# Patient Record
Sex: FEMALE | Race: ASIAN | NOT HISPANIC OR LATINO | Employment: STUDENT | URBAN - METROPOLITAN AREA
[De-identification: names, ages, dates, MRNs, and addresses within clinical notes are randomized per-mention and may not be internally consistent; named-entity substitution may affect disease eponyms.]

---
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
## 2022-04-08 ENCOUNTER — OFFICE VISIT (OUTPATIENT)
Dept: FAMILY MEDICINE | Facility: CLINIC | Age: 22
End: 2022-04-08
Payer: COMMERCIAL

## 2022-04-08 VITALS
BODY MASS INDEX: 19.78 KG/M2 | SYSTOLIC BLOOD PRESSURE: 110 MMHG | HEART RATE: 98 BPM | OXYGEN SATURATION: 98 % | WEIGHT: 104.7 LBS | DIASTOLIC BLOOD PRESSURE: 64 MMHG

## 2022-04-08 DIAGNOSIS — F33.0 MILD EPISODE OF RECURRENT MAJOR DEPRESSIVE DISORDER (H): ICD-10-CM

## 2022-04-08 DIAGNOSIS — R21 RASH: ICD-10-CM

## 2022-04-08 DIAGNOSIS — Z23 NEEDS FLU SHOT: ICD-10-CM

## 2022-04-08 DIAGNOSIS — R63.0 ANOREXIA: Primary | ICD-10-CM

## 2022-04-08 DIAGNOSIS — R82.90 ABNORMAL URINALYSIS: ICD-10-CM

## 2022-04-08 LAB
ALBUMIN SERPL-MCNC: 3.9 G/DL (ref 3.5–5)
ALBUMIN UR-MCNC: NEGATIVE MG/DL
ALP SERPL-CCNC: 59 U/L (ref 45–120)
ALT SERPL W P-5'-P-CCNC: 10 U/L (ref 0–45)
ANION GAP SERPL CALCULATED.3IONS-SCNC: 11 MMOL/L (ref 5–18)
APPEARANCE UR: CLEAR
AST SERPL W P-5'-P-CCNC: 14 U/L (ref 0–40)
ATRIAL RATE - MUSE: 74 BPM
BACTERIA #/AREA URNS HPF: ABNORMAL /HPF
BASOPHILS # BLD AUTO: 0 10E3/UL (ref 0–0.2)
BASOPHILS NFR BLD AUTO: 1 %
BILIRUB SERPL-MCNC: 0.6 MG/DL (ref 0–1)
BILIRUB UR QL STRIP: NEGATIVE
BUN SERPL-MCNC: 8 MG/DL (ref 8–22)
C REACTIVE PROTEIN LHE: 0.1 MG/DL (ref 0–0.8)
CALCIUM SERPL-MCNC: 9 MG/DL (ref 8.5–10.5)
CHLORIDE BLD-SCNC: 107 MMOL/L (ref 98–107)
CHOLEST SERPL-MCNC: 124 MG/DL
CO2 SERPL-SCNC: 21 MMOL/L (ref 22–31)
COLOR UR AUTO: YELLOW
CREAT SERPL-MCNC: 0.64 MG/DL (ref 0.6–1.1)
DIASTOLIC BLOOD PRESSURE - MUSE: NORMAL MMHG
EOSINOPHIL # BLD AUTO: 0 10E3/UL (ref 0–0.7)
EOSINOPHIL NFR BLD AUTO: 0 %
ERYTHROCYTE [DISTWIDTH] IN BLOOD BY AUTOMATED COUNT: 12 % (ref 10–15)
ERYTHROCYTE [SEDIMENTATION RATE] IN BLOOD BY WESTERGREN METHOD: 18 MM/HR (ref 0–20)
FASTING STATUS PATIENT QL REPORTED: NORMAL
FERRITIN SERPL-MCNC: 23 NG/ML (ref 6–40)
FOLATE SERPL-MCNC: 11.7 NG/ML
GFR SERPL CREATININE-BSD FRML MDRD: >90 ML/MIN/1.73M2
GLUCOSE BLD-MCNC: 77 MG/DL (ref 70–125)
GLUCOSE UR STRIP-MCNC: NEGATIVE MG/DL
HCT VFR BLD AUTO: 38.5 % (ref 35–47)
HDLC SERPL-MCNC: 63 MG/DL
HGB BLD-MCNC: 13.1 G/DL (ref 11.7–15.7)
HGB UR QL STRIP: ABNORMAL
IMM GRANULOCYTES # BLD: 0 10E3/UL
IMM GRANULOCYTES NFR BLD: 0 %
INTERPRETATION ECG - MUSE: NORMAL
KETONES UR STRIP-MCNC: NEGATIVE MG/DL
LDLC SERPL CALC-MCNC: 46 MG/DL
LEUKOCYTE ESTERASE UR QL STRIP: NEGATIVE
LYMPHOCYTES # BLD AUTO: 2.4 10E3/UL (ref 0.8–5.3)
LYMPHOCYTES NFR BLD AUTO: 38 %
MCH RBC QN AUTO: 30 PG (ref 26.5–33)
MCHC RBC AUTO-ENTMCNC: 34 G/DL (ref 31.5–36.5)
MCV RBC AUTO: 88 FL (ref 78–100)
MONOCYTES # BLD AUTO: 0.3 10E3/UL (ref 0–1.3)
MONOCYTES NFR BLD AUTO: 5 %
MUCOUS THREADS #/AREA URNS LPF: PRESENT /LPF
NEUTROPHILS # BLD AUTO: 3.6 10E3/UL (ref 1.6–8.3)
NEUTROPHILS NFR BLD AUTO: 57 %
NITRATE UR QL: NEGATIVE
P AXIS - MUSE: 80 DEGREES
PH UR STRIP: 6 [PH] (ref 5–8)
PLATELET # BLD AUTO: 237 10E3/UL (ref 150–450)
POTASSIUM BLD-SCNC: 3.9 MMOL/L (ref 3.5–5)
PR INTERVAL - MUSE: 146 MS
PROT SERPL-MCNC: 7.5 G/DL (ref 6–8)
PTH-INTACT SERPL-MCNC: 57 PG/ML (ref 10–86)
QRS DURATION - MUSE: 76 MS
QT - MUSE: 382 MS
QTC - MUSE: 424 MS
R AXIS - MUSE: 93 DEGREES
RBC # BLD AUTO: 4.36 10E6/UL (ref 3.8–5.2)
RBC #/AREA URNS AUTO: ABNORMAL /HPF
SODIUM SERPL-SCNC: 139 MMOL/L (ref 136–145)
SP GR UR STRIP: >=1.03 (ref 1–1.03)
SQUAMOUS #/AREA URNS AUTO: ABNORMAL /LPF
SYSTOLIC BLOOD PRESSURE - MUSE: NORMAL MMHG
T AXIS - MUSE: 51 DEGREES
TRIGL SERPL-MCNC: 73 MG/DL
TSH SERPL DL<=0.005 MIU/L-ACNC: 2.21 UIU/ML (ref 0.3–5)
UROBILINOGEN UR STRIP-ACNC: 0.2 E.U./DL
VENTRICULAR RATE- MUSE: 74 BPM
VIT B12 SERPL-MCNC: 249 PG/ML (ref 213–816)
WBC # BLD AUTO: 6.4 10E3/UL (ref 4–11)
WBC #/AREA URNS AUTO: ABNORMAL /HPF

## 2022-04-08 PROCEDURE — 84630 ASSAY OF ZINC: CPT | Mod: 90 | Performed by: FAMILY MEDICINE

## 2022-04-08 PROCEDURE — 82746 ASSAY OF FOLIC ACID SERUM: CPT | Performed by: FAMILY MEDICINE

## 2022-04-08 PROCEDURE — 84425 ASSAY OF VITAMIN B-1: CPT | Mod: 90 | Performed by: FAMILY MEDICINE

## 2022-04-08 PROCEDURE — 99000 SPECIMEN HANDLING OFFICE-LAB: CPT | Performed by: FAMILY MEDICINE

## 2022-04-08 PROCEDURE — 90686 IIV4 VACC NO PRSV 0.5 ML IM: CPT | Performed by: FAMILY MEDICINE

## 2022-04-08 PROCEDURE — 81001 URINALYSIS AUTO W/SCOPE: CPT | Performed by: FAMILY MEDICINE

## 2022-04-08 PROCEDURE — 36415 COLL VENOUS BLD VENIPUNCTURE: CPT | Performed by: FAMILY MEDICINE

## 2022-04-08 PROCEDURE — 84590 ASSAY OF VITAMIN A: CPT | Mod: 90 | Performed by: FAMILY MEDICINE

## 2022-04-08 PROCEDURE — 93005 ELECTROCARDIOGRAM TRACING: CPT | Performed by: FAMILY MEDICINE

## 2022-04-08 PROCEDURE — 86140 C-REACTIVE PROTEIN: CPT | Performed by: FAMILY MEDICINE

## 2022-04-08 PROCEDURE — 90471 IMMUNIZATION ADMIN: CPT | Performed by: FAMILY MEDICINE

## 2022-04-08 PROCEDURE — 80050 GENERAL HEALTH PANEL: CPT | Performed by: FAMILY MEDICINE

## 2022-04-08 PROCEDURE — 82728 ASSAY OF FERRITIN: CPT | Performed by: FAMILY MEDICINE

## 2022-04-08 PROCEDURE — 99215 OFFICE O/P EST HI 40 MIN: CPT | Mod: 25 | Performed by: FAMILY MEDICINE

## 2022-04-08 PROCEDURE — 83970 ASSAY OF PARATHORMONE: CPT | Performed by: FAMILY MEDICINE

## 2022-04-08 PROCEDURE — 82306 VITAMIN D 25 HYDROXY: CPT | Performed by: FAMILY MEDICINE

## 2022-04-08 PROCEDURE — 85652 RBC SED RATE AUTOMATED: CPT | Performed by: FAMILY MEDICINE

## 2022-04-08 PROCEDURE — 80061 LIPID PANEL: CPT | Performed by: FAMILY MEDICINE

## 2022-04-08 PROCEDURE — 93010 ELECTROCARDIOGRAM REPORT: CPT | Performed by: INTERNAL MEDICINE

## 2022-04-08 PROCEDURE — 82607 VITAMIN B-12: CPT | Performed by: FAMILY MEDICINE

## 2022-04-08 ASSESSMENT — PATIENT HEALTH QUESTIONNAIRE - PHQ9
5. POOR APPETITE OR OVEREATING: SEVERAL DAYS
SUM OF ALL RESPONSES TO PHQ QUESTIONS 1-9: 8

## 2022-04-08 ASSESSMENT — ANXIETY QUESTIONNAIRES
5. BEING SO RESTLESS THAT IT IS HARD TO SIT STILL: NOT AT ALL
3. WORRYING TOO MUCH ABOUT DIFFERENT THINGS: SEVERAL DAYS
IF YOU CHECKED OFF ANY PROBLEMS ON THIS QUESTIONNAIRE, HOW DIFFICULT HAVE THESE PROBLEMS MADE IT FOR YOU TO DO YOUR WORK, TAKE CARE OF THINGS AT HOME, OR GET ALONG WITH OTHER PEOPLE: SOMEWHAT DIFFICULT
2. NOT BEING ABLE TO STOP OR CONTROL WORRYING: SEVERAL DAYS
6. BECOMING EASILY ANNOYED OR IRRITABLE: SEVERAL DAYS
1. FEELING NERVOUS, ANXIOUS, OR ON EDGE: SEVERAL DAYS
GAD7 TOTAL SCORE: 6
7. FEELING AFRAID AS IF SOMETHING AWFUL MIGHT HAPPEN: SEVERAL DAYS

## 2022-04-09 ASSESSMENT — ANXIETY QUESTIONNAIRES: GAD7 TOTAL SCORE: 6

## 2022-04-10 LAB — DEPRECATED CALCIDIOL+CALCIFEROL SERPL-MC: 46 UG/L (ref 20–75)

## 2022-04-10 NOTE — PROGRESS NOTES
PROGRESS NOTE   4/8/2022    SUBJECTIVE:  Ashleigh Bustillo is a 21 year old female who presents for     Chief Complaint   Patient presents with     Health Maintenance     therapist recommended: medically okay to wait for [facility] program. referral for nutritionist. follow up on urine test.      Derm Problem     x 2 days, new appearance of red itchy spots.      Patient comes in today because of desiring a follow-up in a work-up for recently discovered restrictive eating.  Patient notes that in all honesty she has been restrictive eating since middle school.  She is very careful about what she eats.  She denies that she is inducing vomiting she denies that she is using any kind of laxatives but she eats maybe 2 times a day.  She is eating and she assures me that she is eating on a daily basis.  When asked her what she was eating and what she ate yesterday she said that she had a half of a burrito bowl and then some egg drop soup about a bowl of that and 2 pieces of sushi.  That is what she had the entire day.  She does see a therapist for depression and thinks that her depression is fairly well controlled currently.  She has had a history of a suicide attempt in the past as well.  The therapist has been aware of this restrictive eating for quite some time and advised her to make an appointment with me for lab work etc. to make sure that she could wait until the summer when she wants to be enrolled in the [facility] program.  Patient is a difficult historian but as far as I can understand she was evaluated in December for the [facility] program and they suggested a 5-day a week intensive therapy program but she does not want to do that until school is over.  She feels like it would interfere too much with her schoolwork and so she would like to delay that.  The therapist wanted her to come in to have evaluation to make sure it was okay for her to wait until school was done for this treatment.  Patient did have ketones in her urine last  August when she was here for well-child check and that has never been followed up on.  We will go ahead and recheck a urinalysis today.  Interestingly her weight today is 104 which is stable essentially every time that we have seen her since 2017.  We do not see her very frequently so it may have gone up and down but right now is stable from what it has been since 2017.  She really has not gained a whole lot of weight and overall she is maintaining the same percentiles that she has been on.  She has been seeing the same therapist for about 1 or 2 years.  She does not have any program set up for the summer but again is hoping to do that this summer rather than trying to interfere with her schoolwork currently.  She does have a menstrual cycle every 3 months but she is on Seasonale which causes her to have menstrual cycle.  She is still getting menstrual cycle on that regular basis.  As noted above she does have a history of depression and is currently doing okay.  She continues on Prozac 10 mg a day.  Please see PHQ-9 and ASIA which are both completed in their entirety today.  She has plenty of that at home.  She notes that recently in the last few weeks her grandfathers  and she had a PET diet and so she is little bit down currently but feels like that is normal for the things that have occurred.  She declines HIV and hepatitis C testing.  She does have a rash on her face that is basically over the upper cheek region bilaterally.  She has noticed that for about the last 2 or 3 days.  As we discussed what was going on and any new products or soaps she notes that she is using a new essential oil on her face and that may be what is causing this irritation.  She does desire flu vaccination today.     Patient Active Problem List   Diagnosis     Suicide Attempt     Anorexia       Current Outpatient Medications   Medication Sig Dispense Refill     FLUoxetine (PROZAC) 10 MG tablet TAKE 1 TABLET(10 MG) BY MOUTH DAILY 90  tablet 1     levonorgestrel-ethinyl estradiol (SEASONALE) 0.15-0.03 MG tablet Take 1 tablet by mouth daily       VITAMIN D PO          No Known Allergies    Past Medical History:   Diagnosis Date     Anorexia 03/2022     Anxiety      Mild episode of recurrent major depressive disorder (H)      Suicide attempt (H)     at least once, hospitalized       History reviewed. No pertinent surgical history.    History   Smoking Status     Never Smoker   Smokeless Tobacco     Never Used       OBJECTIVE:     /64   Pulse 98   Wt 47.5 kg (104 lb 11.2 oz)   LMP 01/04/2022 (Approximate)   SpO2 98%   Breastfeeding No   BMI 19.78 kg/m      Physical Exam:  GENERAL APPEARANCE: A&A, NAD, well hydrated, well nourished  SKIN:  Normal skin turgor, no lesions  On exam of her face she has red raised bumps without evidence for pustules on the upper cheek region as well as on her chin as well as on her forehead.  There is no evidence for any secondary infection be present in those areas.   HEENT: moist mucous membranes, no rhinorrhea, PERRLA, TM's clear bilaterally, Throat without significant erythema or exudate.  NECK: Supple, full ROM, no significant lymphadenopathy or thyromegaly  CV: RRR, no M/G/R   LUNGS: CTAB  ABDOMEN: S&NT, no masses or organomegaly, positive bowel sounds   EXTREMITY: no swelling noted.  Full range of motion of all 4 extremities.   NEURO: no gross deficits   PSYCHIATRIC;  Mood appropriate, memory intact  A&O x3, thought processes congruent, non-tangential. No hallucinations/delusions. Insight/judgment: intact. Denies suicidal/homicidal ideations.    PHQ-9:  Last PHQ-9 4/8/2022   1.  Little interest or pleasure in doing things 1   2.  Feeling down, depressed, or hopeless 1   3.  Trouble falling or staying asleep, or sleeping too much 2   4.  Feeling tired or having little energy 1   5.  Poor appetite or overeating 1   6.  Feeling bad about yourself 1   7.  Trouble concentrating 1   8.  Moving slowly or  restless 0   Q9: Thoughts of better off dead/self-harm past 2 weeks 0   PHQ-9 Total Score 8   Difficulty at work, home, or with people Somewhat difficult       GAD7:  ASIA-7  4/8/2022   1. Feeling nervous, anxious, or on edge 1   2. Not being able to stop or control worrying 1   3. Worrying too much about different things 1   4. Trouble relaxing 1   5. Being so restless that it is hard to sit still 0   6. Becoming easily annoyed or irritable 1   7. Feeling afraid, as if something awful might happen 1   ASIA-7 Total Score 6   If you checked any problems, how difficult have they made it for you to do your work, take care of things at home, or get along with other people? Somewhat difficult       LABS:     Recent Results (from the past 240 hour(s))   EKG 12-lead, tracing only    Collection Time: 04/08/22 11:49 AM   Result Value Ref Range    Systolic Blood Pressure  mmHg    Diastolic Blood Pressure  mmHg    Ventricular Rate 74 BPM    Atrial Rate 74 BPM    NV Interval 146 ms    QRS Duration 76 ms     ms    QTc 424 ms    P Axis 80 degrees    R AXIS 93 degrees    T Axis 51 degrees    Interpretation ECG       Sinus rhythm  Rightward axis  Borderline ECG  When compared with ECG of 21-AUG-2014 20:43, No significant change was found  Confirmed by GRACIE CAIN MD LOC:[facility] ([id number]) on 4/8/2022 2:29:34 PM     Comprehensive metabolic panel    Collection Time: 04/08/22 12:19 PM   Result Value Ref Range    Sodium 139 136 - 145 mmol/L    Potassium 3.9 3.5 - 5.0 mmol/L    Chloride 107 98 - 107 mmol/L    Carbon Dioxide (CO2) 21 (L) 22 - 31 mmol/L    Anion Gap 11 5 - 18 mmol/L    Urea Nitrogen 8 8 - 22 mg/dL    Creatinine 0.64 0.60 - 1.10 mg/dL    Calcium 9.0 8.5 - 10.5 mg/dL    Glucose 77 70 - 125 mg/dL    Alkaline Phosphatase 59 45 - 120 U/L    AST 14 0 - 40 U/L    ALT 10 0 - 45 U/L    Protein Total 7.5 6.0 - 8.0 g/dL    Albumin 3.9 3.5 - 5.0 g/dL    Bilirubin Total 0.6 0.0 - 1.0 mg/dL    GFR Estimate >90 >60 mL/min/1.73m2   TSH  with free T4 reflex    Collection Time: 04/08/22 12:19 PM   Result Value Ref Range    TSH 2.21 0.30 - 5.00 uIU/mL   Vitamin D Deficiency    Collection Time: 04/08/22 12:19 PM   Result Value Ref Range    Vitamin D, Total (25-Hydroxy) 46 20 - 75 ug/L   CRP inflammation    Collection Time: 04/08/22 12:19 PM   Result Value Ref Range    CRP 0.1 0.0-<0.8 mg/dL   Erythrocyte sedimentation rate auto    Collection Time: 04/08/22 12:19 PM   Result Value Ref Range    Erythrocyte Sedimentation Rate 18 0 - 20 mm/hr   Vitamin B12    Collection Time: 04/08/22 12:19 PM   Result Value Ref Range    Vitamin B12 249 213 - 816 pg/mL   Parathyroid Hormone Intact    Collection Time: 04/08/22 12:19 PM   Result Value Ref Range    Parathyroid Hormone Intact 57 10 - 86 pg/mL   Lipid panel reflex to direct LDL Fasting    Collection Time: 04/08/22 12:19 PM   Result Value Ref Range    Cholesterol 124 <=199 mg/dL    Triglycerides 73 <=149 mg/dL    Direct Measure HDL 63 >=50 mg/dL    LDL Cholesterol Calculated 46 <=129 mg/dL    Patient Fasting > 8hrs? Unknown    Folate    Collection Time: 04/08/22 12:19 PM   Result Value Ref Range    Folic Acid 11.7 >=3.5 ng/mL   Ferritin    Collection Time: 04/08/22 12:19 PM   Result Value Ref Range    Ferritin 23 6 - 40 ng/mL   CBC with platelets and differential    Collection Time: 04/08/22 12:19 PM   Result Value Ref Range    WBC Count 6.4 4.0 - 11.0 10e3/uL    RBC Count 4.36 3.80 - 5.20 10e6/uL    Hemoglobin 13.1 11.7 - 15.7 g/dL    Hematocrit 38.5 35.0 - 47.0 %    MCV 88 78 - 100 fL    MCH 30.0 26.5 - 33.0 pg    MCHC 34.0 31.5 - 36.5 g/dL    RDW 12.0 10.0 - 15.0 %    Platelet Count 237 150 - 450 10e3/uL    % Neutrophils 57 %    % Lymphocytes 38 %    % Monocytes 5 %    % Eosinophils 0 %    % Basophils 1 %    % Immature Granulocytes 0 %    Absolute Neutrophils 3.6 1.6 - 8.3 10e3/uL    Absolute Lymphocytes 2.4 0.8 - 5.3 10e3/uL    Absolute Monocytes 0.3 0.0 - 1.3 10e3/uL    Absolute Eosinophils 0.0 0.0 - 0.7  10e3/uL    Absolute Basophils 0.0 0.0 - 0.2 10e3/uL    Absolute Immature Granulocytes 0.0 <=0.4 10e3/uL   UA reflex to Microscopic and Culture    Collection Time: 04/08/22 12:26 PM    Specimen: Urine, Midstream   Result Value Ref Range    Color Urine Yellow Colorless, Straw, Light Yellow, Yellow    Appearance Urine Clear Clear    Glucose Urine Negative Negative mg/dL    Bilirubin Urine Negative Negative    Ketones Urine Negative Negative mg/dL    Specific Gravity Urine >=1.030 1.005 - 1.030    Blood Urine Trace (A) Negative    pH Urine 6.0 5.0 - 8.0    Protein Albumin Urine Negative Negative mg/dL    Urobilinogen Urine 0.2 0.2, 1.0 E.U./dL    Nitrite Urine Negative Negative    Leukocyte Esterase Urine Negative Negative   Urine Microscopic    Collection Time: 04/08/22 12:26 PM   Result Value Ref Range    Bacteria Urine Many (A) None Seen /HPF    RBC Urine 0-2 0-2 /HPF /HPF    WBC Urine 5-10 (A) 0-5 /HPF /HPF    Squamous Epithelials Urine Moderate (A) None Seen /LPF    Mucus Urine Present (A) None Seen /LPF          ASSESSMENT/PLAN:     Anorexia  - Comprehensive metabolic panel  - CBC with Platelets & Differential  - TSH with free T4 reflex  - Vitamin D Deficiency  - CRP inflammation  - Erythrocyte sedimentation rate auto  - Vitamin B12  - Zinc  - Parathyroid Hormone Intact  - UA reflex to Microscopic and Culture  - Lipid panel reflex to direct LDL Fasting  - Vitamin A  - Vitamin B1 whole blood  - Folate  - Ferritin  - EKG 12-lead, tracing only  - Comprehensive metabolic panel  - CBC with Platelets & Differential  - TSH with free T4 reflex  - Vitamin D Deficiency  - CRP inflammation  - Erythrocyte sedimentation rate auto  - Vitamin B12  - Zinc  - Parathyroid Hormone Intact  - UA reflex to Microscopic and Culture  - Lipid panel reflex to direct LDL Fasting  - Vitamin A  - Vitamin B1 whole blood  - Folate  - Ferritin  - Urine Microscopic    Rash    Mild episode of recurrent major depressive disorder (H)    Abnormal  urinalysis  - UA reflex to Microscopic and Culture  - UA reflex to Microscopic and Culture  - Urine Microscopic    Needs flu shot  - INFLUENZA VACCINE IM >6 MO VALENT IIV4 (ALFURIA/FLUZONE)    Patient comes in today because of desiring evaluation of medical causes and medical concerns given the fact that she was just recently diagnosed with restrictive eating.  She overall feels like things are doing okay.  She would like to wait until after school is out to be enrolled in the [facility].  She apparently had a Zoom evaluation in December for the [facility] program and they recommended intensive program which is 5 days a week and she did not want that to interfere with school so has not enrolled in that program.  The therapist that she is seeing on a regular basis suggested that she come in and have evaluation done to see if it is safe for her to wait until after school is over to get enrolled in this program.  We did do lab work today to evaluate electrolytes.  Ferritin level was drawn as well as folate B12 1 B12 vitamin D vitamin A zinc lipid panel urinalysis parathyroid hormone sed rate CRP thyroid level CBC and metabolic panel were all drawn today.  Urinalysis repeated today given the fact that she did have ketones in her urine in August when she was here for physical exam.  I will contact her with results of those when they return.  EKG was also done today.  She does have a history of depression is currently on Prozac.  This seems to be working fine.  She is not have any difficulties with that.  She is not suicidal or homicidal.  Please see PHQ-9 and ASIA which are both completed in their entirety today.  She does not need a refill of the Prozac today but when she does she certainly will let me know.  She desired flu vaccination which was given today as well.  We discussed the fact that if her labs are all okay and her EKG looks okay I think is fine for her to wait until the summer to enroll in the [facility] program.   I did urge her to call now to get enrolled in the program so that she can start as soon as she is done with school in the summer.  I do not want it to wait until June and then she call and then they can get her in for the summer.  She voiced understanding of that.  In terms of the rash on her face I think this is probably a contact dermatitis and have explained that to her.  I suspect it probably is due to the essential oils that she has been using recently on her face.  He started using the essential oils a few days ago and that is exactly when the rash started.  She is can stop the essential oils and see if that clears up the rash.  If it does not she certainly should let me know and I had be happy to refer to the dermatologist.  All of her questions were answered today.  If she has additional questions or concerns will let me know. 45 minutes spent on the day of encounter doing chart review, history and exam, counseling, coordination of care, documentation and other activities as noted.   Lauren Sen MD    This note was dictated using voice recognition software. Any grammatical errors, context distortions, or spelling errors are not intentional   Answers for HPI/ROS submitted by the patient on 4/8/2022  How many servings of fruits and vegetables do you eat daily?: 0-1  On average, how many sweetened beverages do you drink each day (Examples: soda, juice, sweet tea, etc.  Do NOT count diet or artificially sweetened beverages)?: 1  How many minutes a day do you exercise enough to make your heart beat faster?: 30 to 60  How many days a week do you exercise enough to make your heart beat faster?: 4  How many days per week do you miss taking your medication?: 1  What is the reason for your visit today?: Follow up on urine test from last appointment, get my weight checked, and ask for nutritionist referrals

## 2022-04-11 LAB
ANNOTATION COMMENT IMP: NORMAL
RETINYL PALMITATE SERPL-MCNC: <0.02 MG/L
VIT A SERPL-MCNC: 0.72 MG/L

## 2022-04-12 LAB — ZINC SERPL-MCNC: 82.2 UG/DL

## 2022-04-16 LAB — VIT B1 PYROPHOSHATE BLD-SCNC: 120 NMOL/L

## 2022-05-23 DIAGNOSIS — F33.0 MILD EPISODE OF RECURRENT MAJOR DEPRESSIVE DISORDER (H): ICD-10-CM

## 2022-05-23 RX ORDER — FLUOXETINE 10 MG/1
TABLET, FILM COATED ORAL
Qty: 90 TABLET | Refills: 0 | Status: SHIPPED | OUTPATIENT
Start: 2022-05-23 | End: 2022-08-20

## 2022-05-23 NOTE — TELEPHONE ENCOUNTER
"Routing refill request to provider for review/approval because:  phq 9    Last Written Prescription Date:  8/23/21  Last Fill Quantity: 90,  # refills: 1   Last office visit provider:  4/8/22     Requested Prescriptions   Pending Prescriptions Disp Refills     FLUoxetine (PROZAC) 10 MG tablet [Pharmacy Med Name: FLUOXETINE 10MG TABLETS] 90 tablet 1     Sig: TAKE 1 TABLET(10 MG) BY MOUTH DAILY       SSRIs Protocol Failed - 5/23/2022  3:34 AM        Failed - PHQ-9 score less than 5 in past 6 months     Please review last PHQ-9 score.           Passed - Medication is active on med list        Passed - Patient is age 18 or older        Passed - No active pregnancy on record        Passed - No positive pregnancy test in last 12 months        Passed - Recent (6 mo) or future (30 days) visit within the authorizing provider's specialty     Patient had office visit in the last 6 months or has a visit in the next 30 days with authorizing provider or within the authorizing provider's specialty.  See \"Patient Info\" tab in inbasket, or \"Choose Columns\" in Meds & Orders section of the refill encounter.                 Michelle Cochran RN 05/23/22 6:40 PM  "

## 2022-08-20 DIAGNOSIS — F33.0 MILD EPISODE OF RECURRENT MAJOR DEPRESSIVE DISORDER (H): ICD-10-CM

## 2022-08-20 RX ORDER — FLUOXETINE 10 MG/1
TABLET, FILM COATED ORAL
Qty: 90 TABLET | Refills: 0 | Status: SHIPPED | OUTPATIENT
Start: 2022-08-20 | End: 2022-11-02

## 2022-08-20 NOTE — TELEPHONE ENCOUNTER
"Routing refill request to provider for review/approval because:  phq 9    Last Written Prescription Date:  5/23/22  Last Fill Quantity: 90,  # refills: 0   Last office visit provider:  4/8/22     Requested Prescriptions   Pending Prescriptions Disp Refills     FLUoxetine (PROZAC) 10 MG tablet [Pharmacy Med Name: FLUOXETINE 10MG TABLETS] 90 tablet 0     Sig: TAKE 1 TABLET(10 MG) BY MOUTH DAILY       SSRIs Protocol Failed - 8/20/2022  3:30 AM        Failed - PHQ-9 score less than 5 in past 6 months     Please review last PHQ-9 score.           Passed - Medication is active on med list        Passed - Patient is age 18 or older        Passed - No active pregnancy on record        Passed - No positive pregnancy test in last 12 months        Passed - Recent (6 mo) or future (30 days) visit within the authorizing provider's specialty     Patient had office visit in the last 6 months or has a visit in the next 30 days with authorizing provider or within the authorizing provider's specialty.  See \"Patient Info\" tab in inbasket, or \"Choose Columns\" in Meds & Orders section of the refill encounter.                 Michelle Cochran RN 08/20/22 5:43 PM  "

## 2022-09-10 ENCOUNTER — HEALTH MAINTENANCE LETTER (OUTPATIENT)
Age: 22
End: 2022-09-10

## 2022-11-02 ENCOUNTER — OFFICE VISIT (OUTPATIENT)
Dept: FAMILY MEDICINE | Facility: CLINIC | Age: 22
End: 2022-11-02
Payer: COMMERCIAL

## 2022-11-02 VITALS
HEART RATE: 94 BPM | WEIGHT: 113.1 LBS | OXYGEN SATURATION: 97 % | BODY MASS INDEX: 21.37 KG/M2 | SYSTOLIC BLOOD PRESSURE: 102 MMHG | DIASTOLIC BLOOD PRESSURE: 64 MMHG

## 2022-11-02 DIAGNOSIS — Z23 NEEDS FLU SHOT: ICD-10-CM

## 2022-11-02 DIAGNOSIS — Z30.41 SURVEILLANCE OF PREVIOUSLY PRESCRIBED CONTRACEPTIVE PILL: ICD-10-CM
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
## 2022-11-02 DIAGNOSIS — R63.0 ANOREXIA: ICD-10-CM

## 2022-11-02 DIAGNOSIS — F33.41 RECURRENT MAJOR DEPRESSIVE DISORDER, IN PARTIAL REMISSION (H): Primary | ICD-10-CM

## 2022-11-02 DIAGNOSIS — Z23 NEED FOR COVID-19 VACCINE: ICD-10-CM

## 2022-11-02 PROCEDURE — 91312 COVID-19,PF,PFIZER BOOSTER BIVALENT: CPT | Performed by: FAMILY MEDICINE

## 2022-11-02 PROCEDURE — 90686 IIV4 VACC NO PRSV 0.5 ML IM: CPT | Performed by: FAMILY MEDICINE

## 2022-11-02 PROCEDURE — 99214 OFFICE O/P EST MOD 30 MIN: CPT | Mod: 25 | Performed by: FAMILY MEDICINE

## 2022-11-02 PROCEDURE — 90471 IMMUNIZATION ADMIN: CPT | Performed by: FAMILY MEDICINE

## 2022-11-02 PROCEDURE — 96127 BRIEF EMOTIONAL/BEHAV ASSMT: CPT | Performed by: FAMILY MEDICINE

## 2022-11-02 PROCEDURE — 0124A COVID-19,PF,PFIZER BOOSTER BIVALENT: CPT | Performed by: FAMILY MEDICINE

## 2022-11-02 RX ORDER — FLUOXETINE 10 MG/1
20 TABLET, FILM COATED ORAL DAILY
Qty: 90 TABLET | Refills: 0 | COMMUNITY
Start: 2022-11-02 | End: 2022-11-20

## 2022-11-02 ASSESSMENT — ANXIETY QUESTIONNAIRES
6. BECOMING EASILY ANNOYED OR IRRITABLE: SEVERAL DAYS
1. FEELING NERVOUS, ANXIOUS, OR ON EDGE: SEVERAL DAYS
GAD7 TOTAL SCORE: 6
7. FEELING AFRAID AS IF SOMETHING AWFUL MIGHT HAPPEN: SEVERAL DAYS
4. TROUBLE RELAXING: SEVERAL DAYS
IF YOU CHECKED OFF ANY PROBLEMS ON THIS QUESTIONNAIRE, HOW DIFFICULT HAVE THESE PROBLEMS MADE IT FOR YOU TO DO YOUR WORK, TAKE CARE OF THINGS AT HOME, OR GET ALONG WITH OTHER PEOPLE: SOMEWHAT DIFFICULT
GAD7 TOTAL SCORE: 6
GAD7 TOTAL SCORE: 6
2. NOT BEING ABLE TO STOP OR CONTROL WORRYING: SEVERAL DAYS
7. FEELING AFRAID AS IF SOMETHING AWFUL MIGHT HAPPEN: SEVERAL DAYS
5. BEING SO RESTLESS THAT IT IS HARD TO SIT STILL: NOT AT ALL
8. IF YOU CHECKED OFF ANY PROBLEMS, HOW DIFFICULT HAVE THESE MADE IT FOR YOU TO DO YOUR WORK, TAKE CARE OF THINGS AT HOME, OR GET ALONG WITH OTHER PEOPLE?: SOMEWHAT DIFFICULT
3. WORRYING TOO MUCH ABOUT DIFFERENT THINGS: SEVERAL DAYS

## 2022-11-02 ASSESSMENT — PAIN SCALES - GENERAL: PAINLEVEL: NO PAIN (0)

## 2022-11-02 ASSESSMENT — PATIENT HEALTH QUESTIONNAIRE - PHQ9
SUM OF ALL RESPONSES TO PHQ QUESTIONS 1-9: 9
10. IF YOU CHECKED OFF ANY PROBLEMS, HOW DIFFICULT HAVE THESE PROBLEMS MADE IT FOR YOU TO DO YOUR WORK, TAKE CARE OF THINGS AT HOME, OR GET ALONG WITH OTHER PEOPLE: SOMEWHAT DIFFICULT
SUM OF ALL RESPONSES TO PHQ QUESTIONS 1-9: 9

## 2022-11-02 NOTE — PROGRESS NOTES
PROGRESS NOTE   11/2/2022    SUBJECTIVE:  Ashleigh Bustillo is a 22 year old female who presents for     Chief Complaint   Patient presents with     Recheck Medication     Increase dosage on fluoxetine request.      Patient comes in today for follow-up of her anxiety and depression.  She is currently on Prozac 10 mg a day.  Overall she feels like things are doing okay.  She does note that she has had more depression type symptoms in the past month or so.  When asked her more about that she said that she just has had a lack of motivation.  Is been hard to motivate to get anything done at all.  She seems to be figuring it out at school.  She has time to catch up in school and her exams are quite a while away from now so she does not feel like she is behind in her schoolwork but she does feel like she just does not have a lot of motivation to do anything.  She continues to go to [employer] is doing online school currently.  She is basically doing her general right now.  She is not suicidal or homicidal.  She had a very difficult time telling me what was going on but as far as I can understand she has a really hard time motivating herself.  She feels like something is bad is going to happen at any moment and she always is feeling a dread.  She is worried about what might happen and she is worried about things that she does not necessarily need to be worried about.  She does note that it kind of comes and goes and she will be okay for a couple of days and then comes back and it seems like it is totally at random.  She is tolerating the Prozac 10 mg a day without difficulty.  She does have a history of anorexia as well as suicide attempts.  She denies that she is suicidal or homicidal.  Please see PHQ-9 and ASIA which are both completed in their entirety today.  She does have a history of anorexia and the last time I saw her in April she said that she was going to do the [employer] program this summer after she was done with school.   She notes that she did not ever do that.  She is going to the gym now and she has a  who has been helping her and she is eating a lot more protein in her diet and she feels like she is overall doing okay.  Her weight is up from 104 pounds in April to 113 pounds today.  She notes that she is doing much better in terms of eating 3 meals a day.  Most of the time she succeeds at eating 3 meals a day but if she does not have a meal she will at least have a protein drink to replace it.  She does continue on Seasonale for birth control as well as menstrual irregularities.  She notes that the Seasonale is working well for her.  She is bleeding when she supposed to and she is not bleeding at other times.  She would very much like to continue on that.  She declines HIV and hepatitis C testing.  We did talk with the fact that she is overdue for physical exam as well as a Chlamydia testing.  She does desire COVID and flu vaccination which will be given today.    Patient Active Problem List   Diagnosis     Suicide Attempt     Anorexia       Current Outpatient Medications   Medication Sig Dispense Refill     FLUoxetine (PROZAC) 10 MG tablet Take 2 tablets (20 mg) by mouth daily 90 tablet 0     levonorgestrel-ethinyl estradiol (SEASONALE) 0.15-0.03 MG tablet Take 1 tablet by mouth daily       VITAMIN D PO          No Known Allergies    Past Medical History:   Diagnosis Date     Anorexia 03/2022     Anxiety      Mild episode of recurrent major depressive disorder (H)      Suicide attempt (H)     at least once, hospitalized       History reviewed. No pertinent surgical history.    History   Smoking Status     Never   Smokeless Tobacco     Never       OBJECTIVE:     /64   Pulse 94   Wt 51.3 kg (113 lb 1.6 oz)   LMP 08/24/2022 (Approximate)   SpO2 97%   Breastfeeding No   BMI 21.37 kg/m      Physical Exam:  GENERAL APPEARANCE: A&A, NAD, well hydrated, well nourished  SKIN:  Normal skin turgor, no  lesions/rashes   CV: RRR, no M/G/R   LUNGS: CTAB  EXTREMITY: no swelling noted.  Full range of motion of all 4 extremities.   NEURO: no gross deficits   PSYCHIATRIC;  Mood appropriate, memory intact  A&O x3, thought processes congruent, non-tangential. No hallucinations/delusions. Insight/judgment: intact. Denies suicidal/homicidal ideations.    PHQ-9:  Last PHQ-9 11/2/2022   1.  Little interest or pleasure in doing things 2   2.  Feeling down, depressed, or hopeless 2   3.  Trouble falling or staying asleep, or sleeping too much 1   4.  Feeling tired or having little energy 1   5.  Poor appetite or overeating 1   6.  Feeling bad about yourself 1   7.  Trouble concentrating 1   8.  Moving slowly or restless 0   Q9: Thoughts of better off dead/self-harm past 2 weeks 0   PHQ-9 Total Score 9   Difficulty at work, home, or with people -       GAD7:  ASIA-7  11/2/2022   1. Feeling nervous, anxious, or on edge 1   2. Not being able to stop or control worrying 1   3. Worrying too much about different things 1   4. Trouble relaxing 1   5. Being so restless that it is hard to sit still 0   6. Becoming easily annoyed or irritable 1   7. Feeling afraid, as if something awful might happen 1   ASIA-7 Total Score 6   If you checked any problems, how difficult have they made it for you to do your work, take care of things at home, or get along with other people? Somewhat difficult         ASSESSMENT/PLAN:     Recurrent major depressive disorder, in partial remission (H)  - FLUoxetine (PROZAC) 10 MG tablet  Dispense: 90 tablet; Refill: 0    Suicide and self-inflicted injury, subsequent encounter (H)    Anorexia    Surveillance of previously prescribed contraceptive pill    Needs flu shot  - INFLUENZA VACCINE IM > 6 MONTHS VALENT IIV4 (AFLURIA/FLUZONE)    Need for COVID-19 vaccine  - COVID-19,PF,PFIZER BOOSTER BIVALENT (12+YRS)    Patient overall seems to be doing okay.  She is on Prozac 10 mg a day.  She notes that she is had more  depression type symptoms and basically is a lack of motivation is her symptom.  She also notes that she is been worrying a lot more than she had been in the past as well.  She currently is only taking 10 mg of Prozac and is wondering about increasing that.  I think it is reasonable to increase it up to 20 mg a day and we did discuss that at length.  She has lots of it at home and so we will just begin taking 2 tablets at a time instead of 1 tablet at a time.  When she needs a refill of that we will certainly let me know.  She does have a history of suicide attempts in the past but is not suicidal or homicidal.  She will let me know if that changes.  I would like to see her back in about 2 months for follow-up of her depression and anxiety.  She does have a history of anorexia was going to do the [employer] program the summer but did not do that.  She notes that she is going to the gym now and does get a lot more protein in her diet and feels like she is doing much better at eating 3 meals a day.  She feels like she is overall doing well in terms of her weight and will call if she has increasing problems or concerns.  She also continues on Seasonale which is working well.  She is bleeding when she supposed to and she is not bleeding at other times.  She does not need a refill of that today.  She did want a flu shot as well as a COVID vaccination which were both given today.  She declined HIV and hepatitis C testing.  She is overdue for physical exam and Chlamydia testing.  We will get that set up within the next few months.  We can follow-up on her anxiety and depression at that time as well.  All of her questions were answered today.  Lauren Sen MD    This note was dictated using voice recognition software. Any grammatical errors, context distortions, or spelling errors are not intentional     History of Present Illness       Reason for visit:  Perscription    She eats 0-1 servings of fruits and vegetables  daily.She consumes 0 sweetened beverage(s) daily.She exercises with enough effort to increase her heart rate 30 to 60 minutes per day.  She exercises with enough effort to increase her heart rate 4 days per week. She is missing 1 dose(s) of medications per week.    Today's PHQ-9         PHQ-9 Total Score: 9    PHQ-9 Q9 Thoughts of better off dead/self-harm past 2 weeks :   Not at all    How difficult have these problems made it for you to do your work, take care of things at home, or get along with other people: Somewhat difficult  Today's ASIA-7 Score: 6

## 2022-11-03 ENCOUNTER — TELEPHONE (OUTPATIENT)
Dept: FAMILY MEDICINE | Facility: CLINIC | Age: 22
End: 2022-11-03

## 2022-11-03 NOTE — TELEPHONE ENCOUNTER
SHEREEI  [facility] is changing medication coverage.    FYI is placed in yellow folder in providers inbox

## 2022-11-19 DIAGNOSIS — F33.41 RECURRENT MAJOR DEPRESSIVE DISORDER, IN PARTIAL REMISSION (H): ICD-10-CM

## 2022-11-20 NOTE — TELEPHONE ENCOUNTER
Please call and let patient know that I did send in a refill of her Prozac to the pharmacy.  This refill is for 20 mg capsule so she only needs to take 1 capsule a day rather than the 2 that she has been taking since seeing me earlier this month.

## 2022-11-20 NOTE — TELEPHONE ENCOUNTER
"Routing refill request to provider for review/approval because:  phq9  Refill rx sig does not match med list    Last Written Prescription Date:  11/2/22  Last Fill Quantity: 90,  # refills: 0   Last office visit provider:  11/2/22     Requested Prescriptions   Pending Prescriptions Disp Refills     FLUoxetine (PROZAC) 10 MG tablet [Pharmacy Med Name: FLUOXETINE 10MG TABLETS] 90 tablet 0     Sig: TAKE 1 TABLET(10 MG) BY MOUTH DAILY       SSRIs Protocol Failed - 11/19/2022  3:33 AM        Failed - PHQ-9 score less than 5 in past 6 months     Please review last PHQ-9 score.           Passed - Medication is active on med list        Passed - Patient is age 18 or older        Passed - No active pregnancy on record        Passed - No positive pregnancy test in last 12 months        Passed - Recent (6 mo) or future (30 days) visit within the authorizing provider's specialty     Patient had office visit in the last 6 months or has a visit in the next 30 days with authorizing provider or within the authorizing provider's specialty.  See \"Patient Info\" tab in inbasket, or \"Choose Columns\" in Meds & Orders section of the refill encounter.                 Michelle Cochran RN 11/20/22 11:17 AM  "

## 2022-11-21 NOTE — TELEPHONE ENCOUNTER
Talked to patient and she understands to only take one capsule. No further questions.     Jen Faust CMA.

## 2022-11-22 DIAGNOSIS — F33.41 RECURRENT MAJOR DEPRESSIVE DISORDER, IN PARTIAL REMISSION (H): ICD-10-CM

## 2022-12-27 PROBLEM — F41.9 ANXIETY: Status: ACTIVE | Noted: 2022-12-27

## 2022-12-27 PROBLEM — F33.41 RECURRENT MAJOR DEPRESSIVE DISORDER, IN PARTIAL REMISSION (H): Status: ACTIVE | Noted: 2022-12-27

## 2022-12-28 ENCOUNTER — OFFICE VISIT (OUTPATIENT)
Dept: FAMILY MEDICINE | Facility: CLINIC | Age: 22
End: 2022-12-28
Payer: COMMERCIAL

## 2022-12-28 VITALS
HEART RATE: 82 BPM | WEIGHT: 114.9 LBS | OXYGEN SATURATION: 97 % | DIASTOLIC BLOOD PRESSURE: 60 MMHG | BODY MASS INDEX: 21.14 KG/M2 | TEMPERATURE: 97.9 F | RESPIRATION RATE: 14 BRPM | SYSTOLIC BLOOD PRESSURE: 114 MMHG | HEIGHT: 62 IN

## 2022-12-28 DIAGNOSIS — F41.9 ANXIETY: ICD-10-CM

## 2022-12-28 DIAGNOSIS — F33.41 RECURRENT MAJOR DEPRESSIVE DISORDER, IN PARTIAL REMISSION (H): Primary | ICD-10-CM
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
## 2022-12-28 DIAGNOSIS — R63.0 ANOREXIA: ICD-10-CM

## 2022-12-28 PROCEDURE — 99214 OFFICE O/P EST MOD 30 MIN: CPT | Performed by: FAMILY MEDICINE

## 2022-12-28 RX ORDER — VENLAFAXINE HYDROCHLORIDE 37.5 MG/1
37.5 CAPSULE, EXTENDED RELEASE ORAL DAILY
Qty: 60 CAPSULE | Refills: 0 | Status: SHIPPED | OUTPATIENT
Start: 2022-12-28 | End: 2023-02-01

## 2022-12-28 ASSESSMENT — ANXIETY QUESTIONNAIRES
1. FEELING NERVOUS, ANXIOUS, OR ON EDGE: SEVERAL DAYS
IF YOU CHECKED OFF ANY PROBLEMS ON THIS QUESTIONNAIRE, HOW DIFFICULT HAVE THESE PROBLEMS MADE IT FOR YOU TO DO YOUR WORK, TAKE CARE OF THINGS AT HOME, OR GET ALONG WITH OTHER PEOPLE: SOMEWHAT DIFFICULT
7. FEELING AFRAID AS IF SOMETHING AWFUL MIGHT HAPPEN: MORE THAN HALF THE DAYS
7. FEELING AFRAID AS IF SOMETHING AWFUL MIGHT HAPPEN: MORE THAN HALF THE DAYS
GAD7 TOTAL SCORE: 10
8. IF YOU CHECKED OFF ANY PROBLEMS, HOW DIFFICULT HAVE THESE MADE IT FOR YOU TO DO YOUR WORK, TAKE CARE OF THINGS AT HOME, OR GET ALONG WITH OTHER PEOPLE?: SOMEWHAT DIFFICULT
5. BEING SO RESTLESS THAT IT IS HARD TO SIT STILL: NOT AT ALL
2. NOT BEING ABLE TO STOP OR CONTROL WORRYING: MORE THAN HALF THE DAYS
4. TROUBLE RELAXING: SEVERAL DAYS
3. WORRYING TOO MUCH ABOUT DIFFERENT THINGS: MORE THAN HALF THE DAYS
6. BECOMING EASILY ANNOYED OR IRRITABLE: MORE THAN HALF THE DAYS

## 2022-12-28 ASSESSMENT — PATIENT HEALTH QUESTIONNAIRE - PHQ9
SUM OF ALL RESPONSES TO PHQ QUESTIONS 1-9: 13
SUM OF ALL RESPONSES TO PHQ QUESTIONS 1-9: 13
10. IF YOU CHECKED OFF ANY PROBLEMS, HOW DIFFICULT HAVE THESE PROBLEMS MADE IT FOR YOU TO DO YOUR WORK, TAKE CARE OF THINGS AT HOME, OR GET ALONG WITH OTHER PEOPLE: VERY DIFFICULT

## 2022-12-28 ASSESSMENT — PAIN SCALES - GENERAL: PAINLEVEL: NO PAIN (0)

## 2022-12-28 NOTE — PROGRESS NOTES
PROGRESS NOTE   12/28/2022    SUBJECTIVE:  Ashleigh Bustillo is a 22 year old female who presents for     Chief Complaint   Patient presents with     Depression     Recheck.      Patient comes in today for recheck.  She has a history of depression and anxiety.  This has been longstanding issue for her.  She did actually have a suicide attempt in the past as well.  I saw her in November and she was on Prozac 10 mg a day.  At that point she noted that she seemed to be getting worse in terms of her depression so we increased her Prozac up from 10 mg to 20 mg.  She comes in today for follow-up.  She notes that since we increased the Prozac she has not really noticed any difference at all.  She continues at [employer] and is barely making it.  Her school is going okay just simply because they have given her extra time to complete all of her assignments.  She has until the middle part of  January to complete them and is currently on [place] vacation.  When I asked if she was doing them or if she is working on completing them since she needs to complete them by the middle part of January she said she is not.  She has a lack of motivation and that was something that we had discussed last time she was here.  She is going to the gym and that is helped her a lot in terms of her emotional health.  She does have a history of anorexia and was supposed to be enrolled at the [employer] program last summer but did not aggressively do that.  Her weight is stable from the last time she was here.  When she was here in November it was 113 and today she is at 114.  She notes that she continues to eat well and again she goes to the gym on a regular basis.  She told me last time that she had a  but this time she denies that she is ever had a  but that her boyfriend helps her a lot in that regard.  She does note that that is been very helpful for her.  On PHQ-9 and ASIA she notes that she is suicidal.  She does  "not have any plan and she can keep her self safe.  She contracted for safety multiple times during the exam today.  She has friends that she can talk to when she has her boyfriend who she can talk to as well.  She is getting out and about and is involved with her friends as well.  She notes that she feels suicidal only very occasionally.  Initially when I talked to her about counseling she was not interested in that at all.  She noted that she did not think that she could cooperate in counseling.  When I saw her in November we had talked about doing counseling and she was not agreeable to it at that point either.    Patient Active Problem List   Diagnosis     Suicide Attempt     Anorexia     Anxiety     Recurrent major depressive disorder, in partial remission (H)       Current Outpatient Medications   Medication Sig Dispense Refill     FLUoxetine (PROZAC) 20 MG capsule Take 1 capsule (20 mg) by mouth daily 90 capsule 0     levonorgestrel-ethinyl estradiol (SEASONALE) 0.15-0.03 MG tablet Take 1 tablet by mouth daily       venlafaxine (EFFEXOR XR) 37.5 MG 24 hr capsule Take 1 capsule (37.5 mg) by mouth daily 60 capsule 0     VITAMIN D PO          No Known Allergies    Past Medical History:   Diagnosis Date     Anorexia 03/2022     Anxiety      Mild episode of recurrent major depressive disorder (H)      Suicide attempt (H)     at least once, hospitalized       History reviewed. No pertinent surgical history.    History   Smoking Status     Never   Smokeless Tobacco     Never       OBJECTIVE:     /60   Pulse 82   Temp 97.9  F (36.6  C) (Oral)   Resp 14   Ht 1.575 m (5' 2\")   Wt 52.1 kg (114 lb 14.4 oz)   LMP  (LMP Unknown)   SpO2 97%   Breastfeeding No   BMI 21.02 kg/m      Physical Exam:  GENERAL APPEARANCE: A&A, NAD, well hydrated, well nourished  SKIN:  Normal skin turgor, no lesions/rashes   CV: RRR, no M/G/R   LUNGS: CTAB  EXTREMITY: no swelling noted.  Full range of motion of all 4 extremities. "   NEURO: no gross deficits   PSYCHIATRIC;  Mood appropriate, memory intact  A&O x3, thought processes congruent, non-tangential. No hallucinations/delusions. Insight/judgment: intact. Denies suicidal/homicidal ideations.    PHQ-9:  Last PHQ-9 12/28/2022   1.  Little interest or pleasure in doing things 2   2.  Feeling down, depressed, or hopeless 2   3.  Trouble falling or staying asleep, or sleeping too much 1   4.  Feeling tired or having little energy 2   5.  Poor appetite or overeating 1   6.  Feeling bad about yourself 2   7.  Trouble concentrating 2   8.  Moving slowly or restless 0   Q9: Thoughts of better off dead/self-harm past 2 weeks 1   PHQ-9 Total Score 13   Difficulty at work, home, or with people -   In the past two weeks have you had thoughts of suicide or self harm? Yes   Do you have concerns about your personal safety or the safety of others? No   In the past 2 weeks have you thought about a plan or had intention to harm yourself? Yes   In the past 2 weeks have you acted on these thoughts in any way? No       GAD7:  ASIA-7  12/28/2022   1. Feeling nervous, anxious, or on edge 1   2. Not being able to stop or control worrying 2   3. Worrying too much about different things 2   4. Trouble relaxing 1   5. Being so restless that it is hard to sit still 0   6. Becoming easily annoyed or irritable 2   7. Feeling afraid, as if something awful might happen 2   ASIA-7 Total Score 10   If you checked any problems, how difficult have they made it for you to do your work, take care of things at home, or get along with other people? Somewhat difficult         ASSESSMENT/PLAN:     Recurrent major depressive disorder, in partial remission (H)  - venlafaxine (EFFEXOR XR) 37.5 MG 24 hr capsule  Dispense: 60 capsule; Refill: 0    Anxiety  - venlafaxine (EFFEXOR XR) 37.5 MG 24 hr capsule  Dispense: 60 capsule; Refill: 0    Anorexia    Suicide and self-inflicted injury, sequela (H)    Patient comes in today for recheck  of her anxiety and depression.  She had been on Prozac 10 mg and we increase that up to 20 mg of Prozac in number.  At that time she felt like her depression was getting worse.  She comes in today for follow-up and notes that since we have increased her Prozac she does not feel like it is helping at all.  She continues to struggle in school.  She is going to [employer] in [place] have allowed her extra time to complete her assignments otherwise she would be miserably behind.  They have given her till January to complete these tasks and even though she is off right now and vacation she has not started to do them or started to try to complete them.  She continues to have a lack of motivation.  We talked a long time today about the fact that her PHQ-9 and ASIA indicates that she is suicidal.  She notes that she is suicidal only very occasionally and does not have any kind of a plan.  She would not follow through with anything.  She is able to contract for safety with me on numerous occasions and during the visit today.  We talked a long time about counseling and the fact that I think is very important that she do counseling at this point especially with how miserable she is feeling.  She really and truly sounds like she is totally miserable and when I brought that up she acknowledges that she is miserable at this point.  She is willing to call her old counselor after we talked for a long time about counseling and the fact that I think that is super important for her at this point she agreed to call her old counselor.  She does not want me to refer to a new counselor she wants to go to her old counselor again.  She told me that she would absolutely call the counselor and get an appointment set up with soon as possible.  I do think she would benefit from a different medication as well.  We talked about the other medications that are available and given the fact that Prozac even with the increased dose did not help her at  all I think maybe switching to a different type of antidepressant might be helpful for her.  We will go ahead and start her on Effexor XR 37.5 mg.  We will have her take that once a day.  I am going to start at a low dose so we do not interfere and cause her side effects.  She should wean off of her Prozac at the same time that she is on the Effexor.  We very clearly talked about a weaning plan and I did write this down for her.  She is to start taking the Effexor and she is to take that every day.  In terms of the Prozac she is currently taking 20 mg a day.  Is a capsule so we can cut it in half so she is getting take a 20 mg capsule of Prozac every other day for 2 weeks and then she should go down to taking a 20 mg capsule every third day for 2 weeks and then go down to 20 mg capsule every fifth day for 2 weeks and then she should take a 20 mg capsule of Prozac once a week for 2 weeks.  That is a quite slow taper and I think she should do okay with weaning off of the Prozac if she goes ahead and follows that instruction.  If she has side effects she certainly should let me know.  I would like to see her back in about 5 weeks to see how she is doing.  I suspect that we will need to increase her Effexor but at this point and rather have her get off of the Prozac before we increase the Effexor as well.  Again she was able to contract for safety on multiple different occasions today and will talk to a friend or her boyfriend if she runs into difficulties.  She does have a history of anorexia but never did get treatment for that either.  Currently she notes that she is eating well and her weight is stable.  Is up quite a bit from what it had been when she was actively anorexic.  At this point we will continue to monitor that carefully but I do not think we need to do further intervention at this time.  All of patient's questions were answered today.  Appointment was set up for her to be seen in about 5 weeks for  follow-up.  She has additional problems or concerns prior to that we will let me know.  We did review the fact that she is due for physical exam and Chlamydia testing and she will set up a physical in the near future as well. 32 minutes spent on the day of encounter doing chart review, history and exam, counseling, coordination of care, documentation and other activities as noted.   Lauren Sen MD    This note was dictated using voice recognition software. Any grammatical errors, context distortions, or spelling errors are not intentional     History of Present Illness       Mental Health Follow-up:  Patient presents to follow-up on Depression & Anxiety.Patient's depression since last visit has been:  Bad  The patient is having other symptoms associated with depression.  Patient's anxiety since last visit has been:  Medium  The patient is having other symptoms associated with anxiety.  Any significant life events: No  Patient is feeling anxious or having panic attacks.  Patient has no concerns about alcohol or drug use.    She eats 0-1 servings of fruits and vegetables daily.She consumes 0 sweetened beverage(s) daily.She exercises with enough effort to increase her heart rate 30 to 60 minutes per day.  She exercises with enough effort to increase her heart rate 4 days per week. She is missing 1 dose(s) of medications per week.  She is not taking prescribed medications regularly due to remembering to take.    Today's PHQ-9         PHQ-9 Total Score: 13    PHQ-9 Q9 Thoughts of better off dead/self-harm past 2 weeks :   Several days  Thoughts of suicide or self harm: (P) Yes  Self-harm Plan:   (P) Yes  Self-harm Action:     (P) No  Safety concerns for self or others: (P) No    How difficult have these problems made it for you to do your work, take care of things at home, or get along with other people: Very difficult  Today's ASIA-7 Score: 10

## 2023-01-22 ENCOUNTER — HEALTH MAINTENANCE LETTER (OUTPATIENT)
Age: 23
End: 2023-01-22

## 2023-02-01 ENCOUNTER — OFFICE VISIT (OUTPATIENT)
Dept: FAMILY MEDICINE | Facility: CLINIC | Age: 23
End: 2023-02-01
Payer: COMMERCIAL

## 2023-02-01 VITALS
OXYGEN SATURATION: 98 % | WEIGHT: 110.2 LBS | BODY MASS INDEX: 20.28 KG/M2 | TEMPERATURE: 98.6 F | HEIGHT: 62 IN | SYSTOLIC BLOOD PRESSURE: 106 MMHG | HEART RATE: 82 BPM | DIASTOLIC BLOOD PRESSURE: 68 MMHG

## 2023-02-01 DIAGNOSIS — R63.0 ANOREXIA: ICD-10-CM

## 2023-02-01 DIAGNOSIS — N92.6 IRREGULAR MENSES: ICD-10-CM

## 2023-02-01 DIAGNOSIS — F33.41 RECURRENT MAJOR DEPRESSIVE DISORDER, IN PARTIAL REMISSION (H): Primary | ICD-10-CM

## 2023-02-01 DIAGNOSIS — F41.9 ANXIETY: ICD-10-CM

## 2023-02-01 PROCEDURE — 99214 OFFICE O/P EST MOD 30 MIN: CPT | Performed by: FAMILY MEDICINE

## 2023-02-01 RX ORDER — VENLAFAXINE HYDROCHLORIDE 75 MG/1
75 CAPSULE, EXTENDED RELEASE ORAL DAILY
Qty: 60 CAPSULE | Refills: 0 | Status: SHIPPED | OUTPATIENT
Start: 2023-02-01 | End: 2023-03-17

## 2023-02-01 ASSESSMENT — ANXIETY QUESTIONNAIRES
IF YOU CHECKED OFF ANY PROBLEMS ON THIS QUESTIONNAIRE, HOW DIFFICULT HAVE THESE PROBLEMS MADE IT FOR YOU TO DO YOUR WORK, TAKE CARE OF THINGS AT HOME, OR GET ALONG WITH OTHER PEOPLE: SOMEWHAT DIFFICULT
GAD7 TOTAL SCORE: 8
3. WORRYING TOO MUCH ABOUT DIFFERENT THINGS: MORE THAN HALF THE DAYS
2. NOT BEING ABLE TO STOP OR CONTROL WORRYING: SEVERAL DAYS
5. BEING SO RESTLESS THAT IT IS HARD TO SIT STILL: SEVERAL DAYS
7. FEELING AFRAID AS IF SOMETHING AWFUL MIGHT HAPPEN: SEVERAL DAYS
6. BECOMING EASILY ANNOYED OR IRRITABLE: SEVERAL DAYS
GAD7 TOTAL SCORE: 8
GAD7 TOTAL SCORE: 8
8. IF YOU CHECKED OFF ANY PROBLEMS, HOW DIFFICULT HAVE THESE MADE IT FOR YOU TO DO YOUR WORK, TAKE CARE OF THINGS AT HOME, OR GET ALONG WITH OTHER PEOPLE?: SOMEWHAT DIFFICULT
1. FEELING NERVOUS, ANXIOUS, OR ON EDGE: SEVERAL DAYS
4. TROUBLE RELAXING: SEVERAL DAYS
7. FEELING AFRAID AS IF SOMETHING AWFUL MIGHT HAPPEN: SEVERAL DAYS

## 2023-02-01 ASSESSMENT — PATIENT HEALTH QUESTIONNAIRE - PHQ9
SUM OF ALL RESPONSES TO PHQ QUESTIONS 1-9: 7
10. IF YOU CHECKED OFF ANY PROBLEMS, HOW DIFFICULT HAVE THESE PROBLEMS MADE IT FOR YOU TO DO YOUR WORK, TAKE CARE OF THINGS AT HOME, OR GET ALONG WITH OTHER PEOPLE: SOMEWHAT DIFFICULT
SUM OF ALL RESPONSES TO PHQ QUESTIONS 1-9: 7

## 2023-02-01 ASSESSMENT — PAIN SCALES - GENERAL: PAINLEVEL: NO PAIN (0)

## 2023-02-01 NOTE — PROGRESS NOTES
PROGRESS NOTE   2/1/2023    SUBJECTIVE:  Ashleigh Bustillo is a 22 year old female who presents for     Chief Complaint   Patient presents with     Depression     Follow up.      Patient comes in today for follow-up of her depression and anxiety.  She has had a longstanding history of anxiety and depression and actually has had a suicide attempt a number of years ago.  She is most recently been on Prozac she was on 10 mg of Prozac and seem to be doing okay on that but then came in this fall because of worsening symptoms.  Her Prozac was increased from 10 mg to 20 mg and that really did not seem like it made any difference at all for her so on 12/28/2022 we started her on Effexor.  She has been trying to wean off of Prozac and now she is on a Prozac pill every 5 days.  She been taking the Effexor 37.5 mg on a daily basis.  She notes that she really did not think that it was making a whole lot of difference for her but then she got her menstrual cycle and when she had a menstrual cycle and got over that she notes that the Effexor really does seem like it is making a bigger difference than the Prozac did by far.  She got her menstrual cycle earlier than she was supposed to acute about 2 weeks early and so she is concerned that maybe there is a problem with her menstrual cycles as well.  She does note that she missed some pills and was wondering if perhaps that is the reason.  She is on Seasonale so she only is supposed to get a menstrual cycle every 3 months.  In terms of her depression and anxiety she continues to have a lack of motivation.  When I saw her in December she noted that that was probably the biggest symptom that she was having difficulty with.  She is in school and is having a hard time finishing her schoolwork.  They actually gave her extra time to finish her schoolwork and even though she had been off of school for a number of weeks she had not even tried to complete that schoolwork.  She notes to me today  that she continues to not have that schoolwork done.  She told me today that she did not finish it in January because if she would open it in January then she would not of had any more time to complete it but now that is February she can open it and then she is got the whole month to try to finish that work.  She has been going to the gym and that is been helping her emotionally as well.  The last time she was here we talked very directly about the fact that she needs to see a counselor.  She assured me that she would call her old counselor back and get an appointment with the counselor in the very near future however she notes today that she did not do that.  Her PHQ-9 and ASIA today show that she is suicidal however she notes that she never act upon that.  She notes that she has several days where she feels like she would be better off dead but again that is similar to what she has had in the past.  She can contract for safety today and notes that she would call her boyfriend if she needed anyone for assistance.  She notes that weaning off of the Prozac seems to be doing fine she does not note that she has had any problems with that.  She does have a history of anorexia but notes that she is eating well and her weight is relatively stable.  She is dropped a couple of pounds from when she was here about a month ago but notes that overall she feels like things are doing well and does not feel like she has any problems with anorexia at this point.  She notes that she was sick with an upper respiratory infection about 2 weeks ago and she thinks that is probably why she lost a few pounds but now assures me that she is eating normally.  In terms of the irregular menstrual cycle she notes that she is taking the Seasonale but is not taking it regularly.  She misses pills here there we talked about that probably being good reason why she is having the irregular cycles.  She is bleeding rather unpredictably at this point.   "She is sexually active we did discuss the fact that then she cannot rely on this for birth control and she voiced understanding of that.    Patient Active Problem List   Diagnosis     Suicide Attempt     Anorexia     Anxiety     Recurrent major depressive disorder, in partial remission (H)       Current Outpatient Medications   Medication Sig Dispense Refill     FLUoxetine (PROZAC) 20 MG capsule Take 1 capsule (20 mg) by mouth daily 90 capsule 0     levonorgestrel-ethinyl estradiol (SEASONALE) 0.15-0.03 MG tablet Take 1 tablet by mouth daily       venlafaxine (EFFEXOR XR) 75 MG 24 hr capsule Take 1 capsule (75 mg) by mouth daily 60 capsule 0     VITAMIN D PO          No Known Allergies    Past Medical History:   Diagnosis Date     Anorexia 03/2022     Anxiety      Mild episode of recurrent major depressive disorder (H)      Suicide attempt (H)     at least once, hospitalized       History reviewed. No pertinent surgical history.    History   Smoking Status     Never   Smokeless Tobacco     Never       OBJECTIVE:     /68   Pulse 82   Temp 98.6  F (37  C) (Oral)   Ht 1.575 m (5' 2\")   Wt 50 kg (110 lb 3.2 oz)   LMP 01/23/2023 (Approximate)   SpO2 98%   Breastfeeding No   BMI 20.16 kg/m      Physical Exam:  GENERAL APPEARANCE: A&A, NAD, well hydrated, well nourished  SKIN:  Normal skin turgor, no lesions/rashes   CV: RRR, no M/G/R   LUNGS: CTAB  EXTREMITY: no swelling noted.  Full range of motion of all 4 extremities.   NEURO: no gross deficits   PSYCHIATRIC;  Mood appropriate, memory intact  A&O x3, thought processes congruent, non-tangential. No hallucinations/delusions. Insight/judgment: intact. Denies suicidal/homicidal ideations.    PHQ-9:  Last PHQ-9 2/1/2023   1.  Little interest or pleasure in doing things 1   2.  Feeling down, depressed, or hopeless 1   3.  Trouble falling or staying asleep, or sleeping too much 0   4.  Feeling tired or having little energy 1   5.  Poor appetite or overeating 0 "   6.  Feeling bad about yourself 1   7.  Trouble concentrating 2   8.  Moving slowly or restless 0   Q9: Thoughts of better off dead/self-harm past 2 weeks 1   PHQ-9 Total Score 7   Difficulty at work, home, or with people -   In the past two weeks have you had thoughts of suicide or self harm? Yes   Do you have concerns about your personal safety or the safety of others? No   In the past 2 weeks have you thought about a plan or had intention to harm yourself? Yes   In the past 2 weeks have you acted on these thoughts in any way? No       GAD7:  ASIA-7  2/1/2023   1. Feeling nervous, anxious, or on edge 1   2. Not being able to stop or control worrying 1   3. Worrying too much about different things 2   4. Trouble relaxing 1   5. Being so restless that it is hard to sit still 1   6. Becoming easily annoyed or irritable 1   7. Feeling afraid, as if something awful might happen 1   ASIA-7 Total Score 8   If you checked any problems, how difficult have they made it for you to do your work, take care of things at home, or get along with other people? Somewhat difficult         ASSESSMENT/PLAN:     Recurrent major depressive disorder, in partial remission (H)  - venlafaxine (EFFEXOR XR) 75 MG 24 hr capsule  Dispense: 60 capsule; Refill: 0  - Adult Mental Health  Referral  - Adult Mental [facility] Referral    Anxiety  - venlafaxine (EFFEXOR XR) 75 MG 24 hr capsule  Dispense: 60 capsule; Refill: 0  - Adult Mental [facility] Referral  - Adult Mental [facility] Referral    Irregular menses    Anorexia    Patient comes in today primarily for recheck of her anxiety and depression.  She overall is doing okay.  She currently is on Effexor XR 37.5 mg on a daily basis.  We switched to that at the end of December when her Prozac was no longer helping her.  We had gone up from 10 mg to 20 mg of Prozac really did not seem like it made any difference for her.  She is tolerating the new dose of the Effexor  without a problem.  She does not have any side effects to the medication.  She was not real sure if the medication was helping her but then she realized that a lot of her emotional issues were due to getting her menstrual cycle and when she got her menstrual cycle and recovered from that then it seemed like she was much better.  She now notes that the Effexor definitely seems to be doing a much better job than the Prozac.  PHQ-9 and ASIA were both done today.  She continues to be suicidal and note that several days she is feeling like she be better off dead although she does not have any plan and can contract for safety today.  She will call her boyfriend if she has any issues with that.  I do think she needs to be seen by a counselor.  She was supposed to call her old counselor the last time she was here and get into see her but she did not do that.  We talked about the fact that I think it is imperative that she absolutely needs to see a counselor I think she also needs to see a psychiatrist that she has had a suicide attempt in the past and has had issues with anxiety and depression for many many years.  She is very young at 22 years old and since she has been dealing with this for so many years I do think she needs additional assistance other than the medication that I given to her.  I did place referral for counseling today as well as psychiatry.  Hopefully she can get in with [facility] a little bit faster than she can with the [facility] system and so did direct the referrals to be done at [facility].  Someone should contact her regarding getting that appointment set up.  If she does not hear from someone she certainly should let me know.  I do think she would benefit from increasing her Effexor as well.  I am going to have her take 2 of the Effexor tablets that she has at home and that I did send a prescription in for Effexor XR 75 mg and she should take that once a day.  I like to see her back in about 6 weeks for  follow-up of the anxiety and the depression.  She will continue her slow taper off of Prozac over the next couple of weeks and then she should be totally off of the Prozac which is great news.  In terms of her anorexia she seems to be relatively stable.  She tells me that she is eating on a regular basis and is not having issues with anorexia.  She has had a little bit of weight loss but she tells me that that is due to the fact that she was ill a couple of weeks ago.  We will need to monitor her weight carefully as she has dropped a few pounds since she was here a month ago but will monitor that for now.  She has had irregular bleeding this month.  She notes that she is on Seasonale and she got her menstrual cycle about 2 weeks early.  We talked about the fact that she has missed some of her medication and that certainly can cause menstrual irregularities.  The other thing that is definitely influencing the menstrual irregularities is that she is on Seasonale and that is although very handy and that she is only getting menstrual cycles every 3 months it also has the most common side effect of causing irregular periods and so I suspect that may be a part of where her irregular menstrual cycles are coming from.  At this point we talked about how important it is that she take a pill every day and at the same time every day and the fact that her not taking a pill every day and at the same time every day is probably contributing to the irregular menstrual cycles and she voiced understanding of that.  We also talked about the fact that she is not protected against pregnancy if she has irregular menstrual cycles.  She needs to at least finish an entire month if not longer with everything working perfectly and not having any irregular bleeding before she can rely on it for birth control and she voiced understanding of that as well.  At this point she is getting continue on the Seasonale and will let me know if she has more  problems with irregular menstrual cycles.    All of her questions were answered today.  We will see her back in about 6 weeks for follow-up.  35 minutes spent on the day of encounter doing chart review, history and exam, counseling, coordination of care, documentation and other activities as noted.   Lauren Sen MD    This note was dictated using voice recognition software. Any grammatical errors, context distortions, or spelling errors are not intentional     History of Present Illness       Mental Health Follow-up:  Patient presents to follow-up on Depression & Anxiety.Patient's depression since last visit has been:  Medium  The patient is not having other symptoms associated with depression.  Patient's anxiety since last visit has been:  Medium  The patient is not having other symptoms associated with anxiety.  Any significant life events: No  Patient is feeling anxious or having panic attacks.  Patient has no concerns about alcohol or drug use.    She eats 0-1 servings of fruits and vegetables daily.She consumes 0 sweetened beverage(s) daily.She exercises with enough effort to increase her heart rate 30 to 60 minutes per day.  She exercises with enough effort to increase her heart rate 4 days per week. She is missing 1 dose(s) of medications per week.  She is not taking prescribed medications regularly due to remembering to take.    Today's PHQ-9         PHQ-9 Total Score: 7    PHQ-9 Q9 Thoughts of better off dead/self-harm past 2 weeks :   Several days  Thoughts of suicide or self harm: (P) Yes  Self-harm Plan:   (P) Yes  Self-harm Action:     (P) No  Safety concerns for self or others: (P) No    How difficult have these problems made it for you to do your work, take care of things at home, or get along with other people: Somewhat difficult  Today's ASIA-7 Score: 8

## 2023-02-02 ENCOUNTER — TRANSFERRED RECORDS (OUTPATIENT)
Dept: HEALTH INFORMATION MANAGEMENT | Facility: CLINIC | Age: 23
End: 2023-02-02

## 2023-03-16 PROBLEM — Z91.51 HISTORY OF SUICIDE ATTEMPT: Status: ACTIVE | Noted: 2023-03-16

## 2023-03-17 ENCOUNTER — OFFICE VISIT (OUTPATIENT)
Dept: FAMILY MEDICINE | Facility: CLINIC | Age: 23
End: 2023-03-17
Payer: COMMERCIAL

## 2023-03-17 VITALS
RESPIRATION RATE: 16 BRPM | BODY MASS INDEX: 20.06 KG/M2 | SYSTOLIC BLOOD PRESSURE: 110 MMHG | WEIGHT: 109 LBS | TEMPERATURE: 98.2 F | HEART RATE: 80 BPM | HEIGHT: 62 IN | DIASTOLIC BLOOD PRESSURE: 80 MMHG | OXYGEN SATURATION: 96 %

## 2023-03-17 DIAGNOSIS — Z91.51 HISTORY OF SUICIDE ATTEMPT: ICD-10-CM

## 2023-03-17 DIAGNOSIS — F41.9 ANXIETY: ICD-10-CM

## 2023-03-17 DIAGNOSIS — R63.0 ANOREXIA: ICD-10-CM

## 2023-03-17 DIAGNOSIS — F33.41 RECURRENT MAJOR DEPRESSIVE DISORDER, IN PARTIAL REMISSION (H): Primary | ICD-10-CM

## 2023-03-17 DIAGNOSIS — N92.6 IRREGULAR MENSES: ICD-10-CM

## 2023-03-17 PROCEDURE — 99214 OFFICE O/P EST MOD 30 MIN: CPT | Performed by: FAMILY MEDICINE

## 2023-03-17 RX ORDER — VENLAFAXINE HYDROCHLORIDE 75 MG/1
75 CAPSULE, EXTENDED RELEASE ORAL DAILY
Qty: 90 CAPSULE | Refills: 0 | Status: SHIPPED | OUTPATIENT
Start: 2023-03-17 | End: 2023-06-15

## 2023-03-17 ASSESSMENT — ANXIETY QUESTIONNAIRES
7. FEELING AFRAID AS IF SOMETHING AWFUL MIGHT HAPPEN: SEVERAL DAYS
4. TROUBLE RELAXING: NOT AT ALL
5. BEING SO RESTLESS THAT IT IS HARD TO SIT STILL: NOT AT ALL
GAD7 TOTAL SCORE: 5
IF YOU CHECKED OFF ANY PROBLEMS ON THIS QUESTIONNAIRE, HOW DIFFICULT HAVE THESE PROBLEMS MADE IT FOR YOU TO DO YOUR WORK, TAKE CARE OF THINGS AT HOME, OR GET ALONG WITH OTHER PEOPLE: SOMEWHAT DIFFICULT
1. FEELING NERVOUS, ANXIOUS, OR ON EDGE: SEVERAL DAYS
6. BECOMING EASILY ANNOYED OR IRRITABLE: SEVERAL DAYS
7. FEELING AFRAID AS IF SOMETHING AWFUL MIGHT HAPPEN: SEVERAL DAYS
8. IF YOU CHECKED OFF ANY PROBLEMS, HOW DIFFICULT HAVE THESE MADE IT FOR YOU TO DO YOUR WORK, TAKE CARE OF THINGS AT HOME, OR GET ALONG WITH OTHER PEOPLE?: SOMEWHAT DIFFICULT
GAD7 TOTAL SCORE: 5
2. NOT BEING ABLE TO STOP OR CONTROL WORRYING: SEVERAL DAYS
GAD7 TOTAL SCORE: 5
3. WORRYING TOO MUCH ABOUT DIFFERENT THINGS: SEVERAL DAYS

## 2023-03-17 ASSESSMENT — PAIN SCALES - GENERAL: PAINLEVEL: NO PAIN (0)

## 2023-03-17 ASSESSMENT — PATIENT HEALTH QUESTIONNAIRE - PHQ9
SUM OF ALL RESPONSES TO PHQ QUESTIONS 1-9: 4
10. IF YOU CHECKED OFF ANY PROBLEMS, HOW DIFFICULT HAVE THESE PROBLEMS MADE IT FOR YOU TO DO YOUR WORK, TAKE CARE OF THINGS AT HOME, OR GET ALONG WITH OTHER PEOPLE: SOMEWHAT DIFFICULT
SUM OF ALL RESPONSES TO PHQ QUESTIONS 1-9: 4

## 2023-03-17 NOTE — PROGRESS NOTES
PROGRESS NOTE   3/17/2023    SUBJECTIVE:  Ashleigh Bustillo is a 22 year old female who presents for     Chief Complaint   Patient presents with     RECHECK     Follow up depression      Patient comes in today for recheck of her anxiety and depression.  She has had longstanding depression and anxiety.  She has been suicidal on her PHQ-9 and ASIA on several different occasions.  The last time I saw her on 2/1/2023 she had been switched to Effexor 37.5 mg on 12/28/2022 and it did seem like it was helping some.  She comes in today for follow-up again.  She notes that she is feeling so much better.  We increased her Effexor from 37.5 up to 75 mg on 2/1/2023 and has made a tremendous difference for her.  She was having a lack of motivation and was unable to get her schoolwork done and just feeling generally dull.  She feels like overall things are doing so much better now.  She was able to wean off her Prozac successfully and she is tolerating the Effexor 75 mg without any problems at all.  Please see PHQ-9 and ASIA which are both completed in their entirety today.  I did give her a referral for counseling as well as psychiatry because of her longstanding history of anxiety depression when she was here on 2/1/2023.  She notes that she has not been able to connect with a counselor yet but she does have an appointment for psychiatry at the beginning of July on 7/1/2023.  She currently feels like things are doing so much better.  She is motivated for school she is keeping up and she actually ahead of her work this quarter.  She is going to the gym which seems to help as well.  She is not suicidal or homicidal.  She would very much like to continue on this medication as it seems like it is helping tremendously.  She also has a history of anorexia but notes that she is eating well.  Her weight has been stable.  She was 110 pounds when she was here at the beginning of February and had lost a couple of pounds which she attributed to  "the fact that she was ill.  Today she is 109 pounds.  She feels like she is eating fine her she is eating 3 meals a day she is doing a lot of meal prep so she gets enough protein etc. in and feels like things are doing well on that front.  She also was having some irregular menstrual cycles which probably was the results of the fact that she was not taking her pills on a regular basis.  She is now taking her birth control pills regularly and has not had any spotting and is bleeding when she is supposed to and not at other times.  She overall is quite pleased with how well she is doing.  She declines HIV and hepatitis C testing today.  She does have a physical set up for 5/3/2023.    Patient Active Problem List   Diagnosis     Anorexia     Anxiety     Recurrent major depressive disorder, in partial remission (H)     History of suicide attempt       Current Outpatient Medications   Medication Sig Dispense Refill     levonorgestrel-ethinyl estradiol (SEASONALE) 0.15-0.03 MG tablet Take 1 tablet by mouth daily       venlafaxine (EFFEXOR XR) 75 MG 24 hr capsule Take 1 capsule (75 mg) by mouth daily 90 capsule 0     VITAMIN D PO          No Known Allergies    Past Medical History:   Diagnosis Date     Anorexia 03/2022     Anxiety      Mild episode of recurrent major depressive disorder (H)      Suicide attempt (H)     at least once, hospitalized       History reviewed. No pertinent surgical history.    History   Smoking Status     Never   Smokeless Tobacco     Never       OBJECTIVE:     /80   Pulse 80   Temp 98.2  F (36.8  C)   Resp 16   Ht 1.575 m (5' 2\")   Wt 49.4 kg (109 lb)   LMP 02/09/2023 (Approximate)   SpO2 96%   BMI 19.94 kg/m      Physical Exam:  GENERAL APPEARANCE: A&A, NAD, well hydrated, well nourished  SKIN:  Normal skin turgor, no lesions/rashes   CV: RRR, no M/G/R   LUNGS: CTAB  EXTREMITY: no swelling noted.  Full range of motion of all 4 extremities.   NEURO: no gross deficits   PSYCHIATRIC; "  Mood appropriate, memory intact  A&O x3, thought processes congruent, non-tangential. No hallucinations/delusions. Insight/judgment: intact. Denies suicidal/homicidal ideations.    PHQ-9:  Last PHQ-9 3/17/2023   1.  Little interest or pleasure in doing things 0   2.  Feeling down, depressed, or hopeless 1   3.  Trouble falling or staying asleep, or sleeping too much 0   4.  Feeling tired or having little energy 1   5.  Poor appetite or overeating 0   6.  Feeling bad about yourself 1   7.  Trouble concentrating 1   8.  Moving slowly or restless 0   Q9: Thoughts of better off dead/self-harm past 2 weeks 0   PHQ-9 Total Score 4   Difficulty at work, home, or with people -   In the past two weeks have you had thoughts of suicide or self harm? -   Do you have concerns about your personal safety or the safety of others? -   In the past 2 weeks have you thought about a plan or had intention to harm yourself? -   In the past 2 weeks have you acted on these thoughts in any way? -       GAD7:  ASIA-7  3/17/2023   1. Feeling nervous, anxious, or on edge 1   2. Not being able to stop or control worrying 1   3. Worrying too much about different things 1   4. Trouble relaxing 0   5. Being so restless that it is hard to sit still 0   6. Becoming easily annoyed or irritable 1   7. Feeling afraid, as if something awful might happen 1   ASIA-7 Total Score 5   If you checked any problems, how difficult have they made it for you to do your work, take care of things at home, or get along with other people? Somewhat difficult         ASSESSMENT/PLAN:     Recurrent major depressive disorder, in partial remission (H)  - venlafaxine (EFFEXOR XR) 75 MG 24 hr capsule  Dispense: 90 capsule; Refill: 0    Anxiety  - venlafaxine (EFFEXOR XR) 75 MG 24 hr capsule  Dispense: 90 capsule; Refill: 0    History of suicide attempt    Anorexia    Irregular menses    Patient comes in today for follow-up of her depression and anxiety.  Overall she is doing  well.  She was recently increased from 37.5 mg of Effexor up to 75 mg of Effexor on 2/1/2023.  She notes that this has made a tremendous difference for her.  She is no longer suicidal or homicidal and is not even thinking about it anymore.  Please see PHQ-9 and ASIA which are both completed in their entirety today.  She does have a history of a suicide attempt many years ago and is not suicidal and never been actively suicidal but often thought that she be better off dead because she just did not have any motivation to do anything.  She notes that now that she has been on the Effexor she is doing fantastic.  She caught up in school she is actually ahead of school now.  She was behind last quarter and has caught herself up completely.  She is feeling great about how things are going and would like to continue on the Effexor 75 mg.  We will go ahead and give her a refill of that for 3-month supply.  If she has changes in her symptoms will let me know.  She does have an appoint with a psychiatrist on 7/1/2023 and I encouraged her to continue to go to that to especially get established with a psychiatrist in case she has more problems since she does have such a long standing history of depression and anxiety.  She does have a history of anorexia but her weight is essentially stable.  She notes that she is doing well in terms of her eating and continues to eat 3 meals a day and does meal prep so that she make sure that she gets enough protein.  She does have a boyfriend who is very helpful in motivating her to eat well and to take care of her self.  She does go to the gym and that seems to help her as well.  She is no longer having any issues with irregular menstrual cycles now that she is taking her birth control pills on a regular basis.  She declined HIV and hepatitis C testing today.  She does have a full physical exam scheduled for 5/3/2023 and I will see her at that time.  She has additional problems or concerns  prior to that we will certainly let me know.  Lauren Sen MD    This note was dictated using voice recognition software. Any grammatical errors, context distortions, or spelling errors are not intentional     History of Present Illness       Mental Health Follow-up:  Patient presents to follow-up on Anxiety.    Patient's anxiety since last visit has been:  Better  The patient is not having other symptoms associated with anxiety.  Any significant life events: No  Patient is not feeling anxious or having panic attacks.  Patient has no concerns about alcohol or drug use.    She eats 0-1 servings of fruits and vegetables daily.She consumes 1 sweetened beverage(s) daily.She exercises with enough effort to increase her heart rate 30 to 60 minutes per day.  She exercises with enough effort to increase her heart rate 4 days per week. She is missing 1 dose(s) of medications per week.  She is not taking prescribed medications regularly due to remembering to take.    Today's PHQ-9         PHQ-9 Total Score: 4    PHQ-9 Q9 Thoughts of better off dead/self-harm past 2 weeks :   Not at all    How difficult have these problems made it for you to do your work, take care of things at home, or get along with other people: Somewhat difficult  Today's ASIA-7 Score: 5

## 2023-06-14 ASSESSMENT — ENCOUNTER SYMPTOMS
DIARRHEA: 0
CONSTIPATION: 0
ARTHRALGIAS: 0
HEMATURIA: 0
HEMATOCHEZIA: 0
NAUSEA: 0
HEARTBURN: 0
EYE PAIN: 0
DIZZINESS: 0
PALPITATIONS: 0
JOINT SWELLING: 0
SHORTNESS OF BREATH: 0
ABDOMINAL PAIN: 0
COUGH: 0
FREQUENCY: 0
MYALGIAS: 0
HEADACHES: 0
NERVOUS/ANXIOUS: 1
DYSURIA: 0
SORE THROAT: 0
PARESTHESIAS: 0
FEVER: 0
CHILLS: 0
BREAST MASS: 0
WEAKNESS: 0

## 2023-06-15 ENCOUNTER — OFFICE VISIT (OUTPATIENT)
Dept: FAMILY MEDICINE | Facility: CLINIC | Age: 23
End: 2023-06-15
Payer: COMMERCIAL

## 2023-06-15 VITALS
TEMPERATURE: 98.8 F | HEART RATE: 87 BPM | RESPIRATION RATE: 18 BRPM | OXYGEN SATURATION: 96 % | HEIGHT: 62 IN | SYSTOLIC BLOOD PRESSURE: 100 MMHG | DIASTOLIC BLOOD PRESSURE: 60 MMHG | BODY MASS INDEX: 20.67 KG/M2 | WEIGHT: 112.3 LBS

## 2023-06-15 DIAGNOSIS — F33.41 RECURRENT MAJOR DEPRESSIVE DISORDER, IN PARTIAL REMISSION (H): ICD-10-CM

## 2023-06-15 DIAGNOSIS — F41.9 ANXIETY: ICD-10-CM

## 2023-06-15 DIAGNOSIS — Z00.00 ROUTINE GENERAL MEDICAL EXAMINATION AT A HEALTH CARE FACILITY: Primary | ICD-10-CM

## 2023-06-15 PROCEDURE — 99395 PREV VISIT EST AGE 18-39: CPT | Performed by: FAMILY MEDICINE

## 2023-06-15 RX ORDER — VENLAFAXINE HYDROCHLORIDE 150 MG/1
150 CAPSULE, EXTENDED RELEASE ORAL DAILY
Qty: 90 CAPSULE | Refills: 1 | Status: SHIPPED | OUTPATIENT
Start: 2023-06-15 | End: 2023-10-03

## 2023-06-15 ASSESSMENT — ENCOUNTER SYMPTOMS
NERVOUS/ANXIOUS: 1
COUGH: 0
EYE PAIN: 0
MYALGIAS: 0
HEMATOCHEZIA: 0
CONSTIPATION: 0
CHILLS: 0
HEADACHES: 0
FREQUENCY: 0
SORE THROAT: 0
HEARTBURN: 0
DYSURIA: 0
SHORTNESS OF BREATH: 0
DIZZINESS: 0
JOINT SWELLING: 0
PALPITATIONS: 0
FEVER: 0
DIARRHEA: 0
HEMATURIA: 0
NAUSEA: 0
ABDOMINAL PAIN: 0
ARTHRALGIAS: 0
WEAKNESS: 0
BREAST MASS: 0
PARESTHESIAS: 0

## 2023-06-15 ASSESSMENT — PAIN SCALES - GENERAL: PAINLEVEL: NO PAIN (0)

## 2023-06-15 NOTE — PROGRESS NOTES
SUBJECTIVE:   CC: Ashleigh is an 22 year old who presents for preventive health visit.       6/15/2023     6:54 AM   Additional Questions   Roomed by Leslie JAY LPN     Healthy Habits:    Getting at least 3 servings of Calcium per day:  NO    Bi-annual eye exam:  Yes    Dental care twice a year:  NO    Sleep apnea or symptoms of sleep apnea:  None    Diet:  Regular (no restrictions)    Frequency of exercise:  2-3 days/week    Duration of exercise:  30-45 minutes    Taking medications regularly:  Yes    Medication side effects:  Other    PHQ-2 Total Score:    Additional concerns today:  No               Have you ever done Advance Care Planning? (For example, a Health Directive, POLST, or a discussion with a medical provider or your loved ones about your wishes): No, advance care planning information given to patient to review.  Patient declined advance care planning discussion at this time.    Social History     Tobacco Use     Smoking status: Never     Passive exposure: Current     Smokeless tobacco: Never   Substance Use Topics     Alcohol use: Yes     Comment: once a month drinks 1-2             6/14/2023    10:37 PM   Alcohol Use   Prescreen: >3 drinks/day or >7 drinks/week? No     Reviewed orders with patient.  Reviewed health maintenance and updated orders accordingly - Yes      Breast Cancer Screening:        History of abnormal Pap smear: NO - age 21-29 PAP every 3 years recommended     Reviewed and updated as needed this visit by clinical staff   Tobacco  Allergies  Meds   Med Hx            Reviewed and updated as needed this visit by Provider   Tobacco     Med Hx               Review of Systems   Constitutional: Negative for chills and fever.   HENT: Negative for congestion, ear pain, hearing loss and sore throat.    Eyes: Negative for pain and visual disturbance.   Respiratory: Negative for cough and shortness of breath.    Cardiovascular: Negative for chest pain, palpitations and peripheral edema.  "  Gastrointestinal: Negative for abdominal pain, constipation, diarrhea, heartburn, hematochezia and nausea.   Breasts:  Negative for tenderness, breast mass and discharge.   Genitourinary: Negative for dysuria, frequency, genital sores, hematuria, pelvic pain, urgency, vaginal bleeding and vaginal discharge.   Musculoskeletal: Negative for arthralgias, joint swelling and myalgias.   Skin: Negative for rash.   Neurological: Negative for dizziness, weakness, headaches and paresthesias.   Psychiatric/Behavioral: Negative for mood changes. The patient is nervous/anxious.           OBJECTIVE:   /60   Pulse 87   Temp 98.8  F (37.1  C) (Oral)   Resp 18   Ht 1.575 m (5' 2\")   Wt 50.9 kg (112 lb 4.8 oz)   LMP 04/14/2023 (Approximate)   SpO2 96%   Breastfeeding No   BMI 20.54 kg/m    Physical Exam  GENERAL: healthy, alert and no distress  EYES: Eyes grossly normal to inspection, PERRL and conjunctivae and sclerae normal  HENT: ear canals and TM's normal, nose and mouth without ulcers or lesions  NECK: no adenopathy, no asymmetry, masses, or scars and thyroid normal to palpation  RESP: lungs clear to auscultation - no rales, rhonchi or wheezes  BREAST: normal without masses, tenderness or nipple discharge and no palpable axillary masses or adenopathy  CV: regular rate and rhythm, normal S1 S2, no S3 or S4, no murmur, click or rub, no peripheral edema and peripheral pulses strong  ABDOMEN: soft, nontender, no hepatosplenomegaly, no masses and bowel sounds normal  MS: no gross musculoskeletal defects noted, no edema  SKIN: no suspicious lesions or rashes  NEURO: Normal strength and tone, mentation intact and speech normal  PSYCH: mentation appears normal, affect normal/bright    Diagnostic Test Results:  Labs reviewed in Epic    ASSESSMENT/PLAN:       ICD-10-CM    1. Routine general medical examination at a health care facility  Z00.00       2. Recurrent major depressive disorder, in partial remission (H)  F33.41 " venlafaxine (EFFEXOR XR) 150 MG 24 hr capsule      3. Anxiety  F41.9 venlafaxine (EFFEXOR XR) 150 MG 24 hr capsule          Patient has been advised of split billing requirements and indicates understanding: Yes      COUNSELING:  Reviewed preventive health counseling, as reflected in patient instructions       Regular exercise       Healthy diet/nutrition        She reports that she has never smoked. She has been exposed to tobacco smoke. She has never used smokeless tobacco.          LORENZO CLARK MD  [facility]

## 2023-07-24 ENCOUNTER — VIRTUAL VISIT (OUTPATIENT)
Dept: PSYCHIATRY | Facility: CLINIC | Age: 23
End: 2023-07-24
Attending: FAMILY MEDICINE
Payer: COMMERCIAL

## 2023-07-24 DIAGNOSIS — R63.0 ANOREXIA: Primary | ICD-10-CM

## 2023-07-24 DIAGNOSIS — F41.9 ANXIETY: ICD-10-CM

## 2023-07-24 DIAGNOSIS — F33.41 RECURRENT MAJOR DEPRESSIVE DISORDER, IN PARTIAL REMISSION (H): ICD-10-CM

## 2023-07-24 PROCEDURE — 99205 OFFICE O/P NEW HI 60 MIN: CPT | Mod: VID | Performed by: NURSE PRACTITIONER

## 2023-07-24 ASSESSMENT — PATIENT HEALTH QUESTIONNAIRE - PHQ9
SUM OF ALL RESPONSES TO PHQ QUESTIONS 1-9: 14
SUM OF ALL RESPONSES TO PHQ QUESTIONS 1-9: 14
10. IF YOU CHECKED OFF ANY PROBLEMS, HOW DIFFICULT HAVE THESE PROBLEMS MADE IT FOR YOU TO DO YOUR WORK, TAKE CARE OF THINGS AT HOME, OR GET ALONG WITH OTHER PEOPLE: VERY DIFFICULT

## 2023-07-24 NOTE — PATIENT INSTRUCTIONS
"Patient Education   The [facility]  What to Expect  Here's what to expect in the Panel Psychiatry Program.   About the program  You'll be meeting with a psychiatric doctor to check your mental health. A psychiatric doctor helps you deal with troubling thoughts and feelings by giving you medicine. They'll make sure you know the plan for your care. You may see them for a long time. When you're feeling better, they may refer you back to seeing your family doctor.   If you have any questions, we'll be glad to talk to you.  About visits  Be open  At your visits, please talk openly about your problems. It may feel hard, but it's the best way for us to help you.  Cancelling visits  If you can't come to your visit, please call us right away at [phone]. If you don't cancel at least 24 hours (1 full day) before your visit, that's \"late cancellation.\"  Not showing up for your visits  Being very late is the same as not showing up. You'll be a \"no show\" if:  You're more than 15 minutes late for a 30-minute (half hour) visit.  You're more than 30 minutes late for a 60-minute (full hour) visit.  If you cancel late or don't show up 2 times within 6 months, we may end your care.  Getting help between visits  If you need help between visits, you can call us Monday to Friday from 8 a.m. to 4:30 p.m. at [phone].  Emergency care  Call 911 or go to the nearest emergency department if your life or someone else's life is in danger.  Call 988 anytime to reach the national Suicide and Crisis hotline.  Medicine refills  To refill your medicine, call your pharmacy. You can also call [facility]'s [facility] at [phone], Monday to Friday, 8 a.m. to 4:30 p.m. It can take 1 to 3 business days to get a refill.   Forms, letters, and tests  You may have papers to fill out, like FMLA, short-term disability, and workability. We can help you with these forms at your visits, but you must have an " appointment. You may need more than 1 visit for this, to be in an intensive therapy program, or both.  Before we can give you medicine for ADHD, we may refer you to get tested for it or confirm it another way.  We may not be able to give you an emotional support animal letter.  We don't do mental health checks ordered by the court.   We don't do mental health testing, but we can refer you to get tested.   Thank you for choosing us for your care.  For informational purposes only. Not to replace the advice of your health care provider. Copyright   2022 [facility]. All rights reserved. [employer] [id number] - 12/22.       Plan:  1.Patient will take the medications as prescribed.   Medications: Continue Effexor 150 mg daily for depression and anxiety  We discussed future plan to switch to Wellbutrin if symptoms not improved with Effexor 150 mg during next visit.   Patient will not stop taking medications or adjust them without consulting with the provider.  2.Patient will call with any problems between visits.  3.Patient will go to the emergency room if not feeling safe , unable to function in the community, or if suicidal, homicidal or hearing voices or having paranoia.  4.Patient will abstain from drugs and alcohol./Pt denies use .  5.Patient will not drive if sedated on medications or under influence of any substance.  6.Patient will not mix psychiatric medications with drugs and alcohol.   7.Patient will watch his diet and exercise.  8.Patient will see non psychiatric providers for non psychiatric disorders.  9. Next appointment in one  month

## 2023-07-24 NOTE — PROGRESS NOTES
Virtual Visit Details    Type of service:  Video Visit     Originating Location (pt. Location): Home    Distant Location (provider location):  On-site  Platform used for Video Visit: NuV[employer] submitted by the patient for this visit:  Patient Health Questionnaire (Submitted on 7/24/2023)  If you checked off any problems, how difficult have these problems made it for you to do your work, take care of things at home, or get along with other people?: Very difficult  PHQ9 TOTAL SCORE: 14

## 2023-07-24 NOTE — PROGRESS NOTES
PSYCHIATRIC DIAGNOSTIC ASSESSMENT      Name:  Ashleigh Bustillo  : 2000    Ashleigh Bustillo is a 23 year old female who is being evaluated via a billable Video visit.      Telemedicine Visit: The patient's condition can be safely assessed and treated via synchronous audio and visual telemedicine encounter.      Reason for Telemedicine Visit: COVID 19 pandemic and the social and physical recommendations by the CDC and MD., Patient has requested telehealth visit, and Patient unable to travel      Originating Site (Patient Location): Patient's home    Distant Site (Provider Location): [facility] Outpatient Setting: [facility]    Consent:  The patient/guardian has verbally consented to: the potential risks and benefits of telemedicine (video visit or phone) versus in person care; bill my insurance or make self-payment for services provided; and responsibility for payment of non-covered services.     Mode of Communication:  [employer] platform     As the provider I attest to compliance with applicable laws and regulations related to telemedicine.                                              CHIEF COMPLAINT   To establish long term psychiatric care following referral by Lauren Sen MD    HISTORY OF PRESENT ILLNESS     Patient is a 23 year old,   Not  or  female  with a history of generalized anxiety disorder, major depressive disorder, and eating disorder female  who presents for initial psychiatric evaluation following referral by her PCP,referral by Lauren Sen MD to establish long-term psychiatric care for the medication management of ongoing psychiatric symptoms related to worsening depression.  Patient reports she continues to struggle with depressive symptoms in the form of low motivation, low energy, poor sleep, anhedonia, and lack of interest in things she used to enjoy despite recently increasing Effexor to 150 mg daily.  Patient stated symptoms has been stabilized fairly  well until about 2 weeks ago.  Patient reports sometimes she endorses passive SI with no intent or plan.  Patient endorsed history of inpatient psychiatric hospitalization over a decade ago when she was a teenager.  Patient also endorsed history of suicidal ideation at nighttime by way of medication overdose with most recently in 2012.  Patient reports she started experiencing depressive and anxiety symptoms since she was little kid.  Patient endorsed history of anorexia but stating appetite has been stabilized and not currently struggling with anorexia.  Patient endorsed prior psychotropic trial with Prozac which was changed to Effexor due to poor efficacy.  Patient denies history of substance abuse.  She also denies history of psychosis, marbella, or hypomania.  Patient denies history of past trauma related to emotional, sexual, or physical abuse.  PSYCHIATRIC HISTORY:   History of Psychiatric Hospitalizations:   - Inpatient: Once when she was 14 years old  - IOP/PHP/Day treatment: None  History of Suicidal Ideation: Positive   History of Suicide Attempts:Positive   History of Self-injurious Behavior: Denies a history of SIB.  Current:  No  History of Violence/Aggression: Negative  History of Commitment? Negative  Electroconvulsive Therapy (ECT):Negative    PSYCHIATRIC REVIEW OF SYSTEMS:   Psychiatric Review of Systems:   Depression:   Reports: depressed mood, suicidal ideation, decreased interest, changes in sleep, changes in appetite, guilt, hopelessness, helplessness, impaired concentration, decreased energy, irritability.  Marbella:   Denies: sleeplessness, increased goal-directed activities, abrupt increase in energy pressured speech  Psychosis:   Denies: visual hallucinations, auditory hallucinations, paranoia  Anxiety:   Reports: excessive worries that are difficult to control, panic attacks  PTSD:   Reports: re-experiencing past trauma, nightmares, increased arousal, avoidance of traumatic stimuli, impaired  "function.  Denies: re-experiencing past trauma, nightmares, increased arousal, avoidance of traumatic stimuli, impaired function.  OCD:   Denies: obsessions, checking, symmetry, cleaning, skin picking.  Eating Disorder:   Denies: restriction, binging, purging.    Sleep:       MEDICATIONS                                                                                                Current Outpatient Medications   Medication Sig    levonorgestrel-ethinyl estradiol (SEASONALE) 0.15-0.03 MG tablet Take 1 tablet by mouth daily    venlafaxine (EFFEXOR XR) 150 MG 24 hr capsule Take 1 capsule (150 mg) by mouth daily    VITAMIN D PO      No current facility-administered medications for this visit.       DRUG MONITORING:  [place] Prescription Monitoring Program evaluating controlled substances in the last year in [place]:  The [place] Prescription Monitoring Program has been reviewed and there are no current concerns with: diversionary activity, early refill requests, and or obtaining the medication from multiple providers       PAST PSYCHOTROPIC MEDICATIONS:  Prozac, Effexor    VITALS   LMP 04/14/2023 (Approximate)      BP Readings from Last 1 Encounters:   06/15/23 100/60     Pulse Readings from Last 1 Encounters:   06/15/23 87     Wt Readings from Last 1 Encounters:   06/15/23 50.9 kg (112 lb 4.8 oz)     Ht Readings from Last 1 Encounters:   06/15/23 1.575 m (5' 2\")     Estimated body mass index is 20.54 kg/m  as calculated from the following:    Height as of 6/15/23: 1.575 m (5' 2\").    Weight as of 6/15/23: 50.9 kg (112 lb 4.8 oz).      PERTINENT HISTORY   PAST MEDICAL HISTORY:   Past Medical History:   Diagnosis Date    Anorexia 03/2022    Anxiety     Mild episode of recurrent major depressive disorder (H)     Suicide attempt (H)     at least once, hospitalized       PAST SURGICAL HISTORY: No past surgical history on file.    FAMILY HISTORY:   Family History   Problem Relation Age of Onset    Scoliosis Sister     " Diabetes Maternal Grandmother        SOCIAL HISTORY:   Social History     Tobacco Use    Smoking status: Never     Passive exposure: Current    Smokeless tobacco: Never    Tobacco comments:     Once in awhile use   Substance Use Topics    Alcohol use: Yes     Comment: once a month drinks 1-2         Seizures or Head Injury: Denies history of head injury. Denies history of seizures.  History of cardiac disease, rheumatic fever, fainting or dizziness, especially with exercise, seizures, chest pain or shortness of breath with exercise, unexplained change in exercise tolerance, palpitations, high blood pressure, or heart murmur?   No    LABS & IMAGING                                                                                                                Personally reviewed  Recent Labs   Lab Test 04/08/22  1219   WBC 6.4   HGB 13.1   HCT 38.5   MCV 88        Recent Labs   Lab Test 04/08/22  1219      POTASSIUM 3.9   CHLORIDE 107   CO2 21*   GLC 77   SUE 9.0   BUN 8   CR 0.64   GFRESTIMATED >90   ALBUMIN 3.9   PROTTOTAL 7.5   AST 14   ALT 10   ALKPHOS 59   BILITOTAL 0.6     Recent Labs   Lab Test 04/08/22  1219   CHOL 124   LDL 46   HDL 63   TRIG 73     Recent Labs   Lab Test 04/08/22  1219   TSH 2.21     No results found for: AMG009, VYEC883, JVUY15HGOPY, VITD3, D2VIT, D3VIT, DTOT, [id number], [id number], [id number], [id number], [id number], [id number]     ALLERGY & IMMUNIZATIONS     No Known Allergies    FAMILY MEDICAL HISTORY:     Family History       Problem (# of Occurrences) Relation (Name,Age of Onset)    Diabetes (1) Maternal Grandmother    Scoliosis (1) Sister              Family history of sudden or unexplained death or an event requiring resuscitation in children or young adults, cardiac arrhythmias (eg, Malika-Parkinson-White syndrome), long QT syndrome, catecholaminergic paroxysmal ventricular tachycardia, Brugada syndrome, arrhythmogenic right ventricular dysplasia, hypertrophic  "cardiomyopathy, dilated cardiomyopathy, or Marfan syndrome?  No    FAMILY PSYCHIATRIC HISTORY:   Psychiatry:Denies  Substance use history in family: Brother and day struggled with drug abuse  Family suicide history: Denies      SIGNIFICANT SOCIAL/FAMILY HISTORY:                                           Born and raised in: [place]  Relationship status: Single, has a boyfriend  Children: None    Highest education level was:currently in college   Service:Denies   Employment status: Part time working at the [employer]  LEGAL:Denies     SUBSTANCE USE HISTORY    Tobacco use: Vapes occasionally  Caffeine: Denies  Current alcohol: 2-3 drinks once weekly  Current substance use:Marijuana  Past use alcohol/substance use:Wendy      MEDICAL REVIEW OF SYSTEMS:   Ten system review was completed with pertinent positives noted above    MENTAL STATUS EXAM:   Mental Status Examination (limited due to video virtual visit format):  Vital Signs: There were no vitals taken for this virtual visit.  Appearance: adequately groomed, appears stated age, and in no apparent distress.  Attitude: cooperative   Eye Contact: good to the extent that can be determined in a video visit  Muscle Strength and Tone: no gross abnormalities based on remote observation  Psychomotor Behavior:  no evidence of tardive dyskinesia, dystonia, or tics based on remote observation  Gait and Station: normal, no gross abnormalities based on remote observation  Speech: clear, coherent, normal prosody, regular rate, regular rhythm and fluent  Associations: No loosening of associations  Thought Process: coherent and goal directed  Thought Content: no evidence of suicidal ideation or homicidal ideation, no evidence of psychotic thought, no auditory hallucinations present and no visual hallucinations present  Mood: \"good\"  Affect: appropriate and in normal range  Insight: good  Judgment: intact, adequate for safety  Impulse Control: " intact  Oriented to: time, place, person and situation  Attention Span and Concentration: normal  Language: Intact  Recent and Remote Memory: intact to interview. Not formally assessed. No amnesia.  Fund of Knowledge: appropriate        SAFETY   Feels safe in home: Yes   Suicidal ideation: Denies  History of suicide attempts:  No   Hx of impulsivity: No     DSM 5 DIAGNOSIS:   1. Recurrent major depressive disorder, in partial remission (H)    2. Anxiety    ASSESSMENT AND PLAN    Patient is a 23 year old,   Not  or  female  with a history of generalized anxiety disorder, major depressive disorder, and eating disorder female  who presents for initial psychiatric evaluation following referral by her PCP,referral by Lauren Sen MD to establish long-term psychiatric care for the medication management of ongoing psychiatric symptoms related to worsening depression.  Patient with a longstanding history of depression and anxiety with multiple suicidal attempts during her teenage years is currently experiencing increased neurovegetative symptoms of depression in the form of low motivation, low energy, increased fatigue, anhedonia, and poor sleep.  Effexor  titrated to 150 mg about 4 weeks ago.  She noticed improvement in symptoms few days following medication adjustment but started experiencing depressive symptoms about 2 weeks ago.  She endorsed passive SI on an intermittent basis but denies intent or plan.  Patient contracted for safety during this assessment.  Patient who was hospitalized for suicidal attempt in the form of medication overdose when she was in sixth grade has not had suicidal attempt for more than a decade.  Prozac is the only medication trial she has had in the past apart from current effects Effexor.  Since the medication was recently titrated to 150 mg, I encouraged patient to give medication more time as it can take up to 4 to 8 weeks before patient can started noticing  significant improvement in symptoms when taking antidepressant like Effexor.  I discussed.  Plan to trying Wellbutrin during next visit if patient symptoms have not improved.  Patient verbalized good understanding and she is amenable to remain on current medication regimen while continue to monitoring symptoms.  Patient with a history of eating disorder reports she has not struggled with anorexia symptoms lately.  She reports appetite is normal and stabilized.  Patient currently denies both suicidal and homicidal ideations.  She also denied both auditory and visual hallucination.  Patient reported no sherley or hypomania.  Patient reports she has only attended 2 sessions of therapy in the past and not sure if it was helpful or not.    Plan:  1.Patient will take the medications as prescribed.   Medications: Continue Effexor 150 mg daily for depression and anxiety  We discussed future plan to switch to Wellbutrin if symptoms not improved with Effexor 150 mg during next visit.   Patient will not stop taking medications or adjust them without consulting with the provider.  2.Patient will call with any problems between visits.  3.Patient will go to the emergency room if not feeling safe , unable to function in the community, or if suicidal, homicidal or hearing voices or having paranoia.  4.Patient will abstain from drugs and alcohol./Pt denies use .  5.Patient will not drive if sedated on medications or under influence of any substance.  6.Patient will not mix psychiatric medications with drugs and alcohol.   7.Patient will watch his diet and exercise.  8.Patient will see non psychiatric providers for non psychiatric disorders.  9. Next appointment in one  month     Risk Assessment:     Ashleigh has notable risk factors for self-harm, including, single status, anxiety and passive SI. However, risk is mitigated by ability to volunteer a safety plan and history of seeking help when needed. Additional steps taken to minimize  risk include making medication adjustment, asking patient to call 911 and go to the ER if not able to stay safe at home,  Therefore, based on all available evidence including the factors cited above, Ashleigh does not appear to be an imminent danger to self or others and does not meet criteria 72 hour hold. However, if patient uses substances or is non-adherent with medication, their risk of decompensation and SI/HI will be elevated. This was discussed with the patient as she verbalized good understanding.   CONSULTS/REFERRALS:   Continue therapy  None at this time  Coordinate care with therapist as needed    MEDICAL:   None at this time  Coordinate care with PCP (Lauren Sen) as needed  Follow up with primary care provider as planned or for acute medical concerns.    PSYCHOEDUCATION:  Medication side effects and alternatives reviewed. Health promotion activities recommended and reviewed today. All questions addressed. Education and counseling completed regarding risks and benefits of medications and psychotherapy options.  Consent provided by patient/guardian  Call the psychiatric nurse line with medication questions or concerns at [phone].  AwoXhart may be used to communicate with your provider, but this is not intended to be used for emergencies.  BLACK BOX WARNING: Discussed the Food and Drug Administration (FDA) requires that all antidepressants carry a warning that some children, adolescents and young adults may be at increased risk of suicide when taking antidepressants. Anyone taking an antidepressant should be watched closely for worsening depression or unusual behavior especially in the first few weeks after starting an SSRI. Keep in mind, antidepressants are more likely to reduce suicide risk in the long run by improving mood.   SEROTONIN SYNDROME:  Discussed risks of Serotonin syndrome (ie, serotonin toxicity) which is a potentially life-threatening condition associated with increased  serotonergic activity in the central nervous system (CNS). It is seen with therapeutic medication use, inadvertent interactions between drugs, and intentional self-poisoning. Serotonin syndrome may involve a spectrum of clinical findings, which often include mental status changes, autonomic hyperactivity, and neuromuscular abnormalities.    BENZODIAZEPINE:  discussion on how benzos work and the need to use them short term due to potential of anxiety getting.  This is a controlled substance with risk for abuse, need to keep in a safe keep place and cannot replace lost scripts.    HYPNOTIC USE: Hypnotic use, risk for CNS depression, sleep-walking, not to mix with ETOH or other CNS depressant, need for six hours of sleep, stop if change in mood.  This is a controlled substance with risk for abuse, need to keep in a safe keep place and cannot replace lost scripts.  FIRST GENERATION ANTIPSYCHOTIC/ SECOND GENERATION ANTIPSYCHOTIC USE:  Atypical need for cardiometabolic monitoring with medication- B/P, weight, blood sugar, cholesterol.  Need to monitor for abnormal movements taught  SAFETY:  We all care about your loved one's safety. To reduce the risk of self-harm, remove access to all:  Firearms, Medicines (both prescribed and over-the-counter), Knives and other sharp objects, Ropes and like materials, and Alcohol  SLEEP HYGIENE: establish a sleep routine, limit screen time 1 hour prior to bed, use bed for sleep only, take sleep/medications on time (including sleepy time tea, trazadone or herbal treatments such as melatonin), aroma therapy, limit caffeine/sugar, yoga, guided imagery, stretch, meditation, limit naps to 20 minutes, make a temperature change in the room, white noise, be mindful of slowing down breathing, take a warm bath/shower, frequently wash sheets, and journaling.   Medlineplus.gov is information for patients.  It is run by the [employer] of Medicine and it contains information about all  disorders, diseases and all medications.      COMMUNITY RESOURCES:    CRISIS NUMBERS: Provided in AVS 2023  National Suicide Prevention Lifeline: 9-780-228-TALK (689-703-3409)  [employer]/resources for a list of additional resources (SOS)            [facility] - [phone]   Urgent Care Adult Mental Ivkx[facility]-[phone] mobile unit/  crisis line  [facility] -[phone]   COPE  [place] Mobile Team -[phone] (adults)/ [phone] (child)  Poison Control Center - 1-682.494.7830    OR  go to nearest ER  Crisis Text Line for any crisis  send this-   To: 257192   [facility]  [phone]  National Suicide Prevention Lifeline: 858.860.8795 (TTY: 657.235.5986). Call anytime for help.  (www.suicidepreventionlifeline.org)  National [place] on Mental Illness (www.[place].org): 547-671-4535 or 681-537-1319.   Mental Health Association (www.mentalhealth.org): 816.529.8525 or 133-246-3855.  [place] Crisis Text Line: Text [place] to 139950  Suicide LifeLine Chat: suicideSocialEars.org/chat    ADMINISTRATIVE BILLIN min spent interviewing patient, reviewing referral documents, obtaining and reviewing outside records, communication with other health specialists, and preparing this report on this day: 23    Video/Phone Start Time:  1:15 pm  Video/Phone End Time:    1:43 pm    Greater than 50% of time was spent in counseling and coordination of care regarding above diagnoses and treatment plan.    Patient Status:  Our psychiatry providers act as a specialty service for Primary Care Providers in the [facility] that seek to optimize medications for unstable patients.  Once medications have been optimized, our providers discharge the patient back to the referring Primary Care Provider for ongoing medication management.  This type of system allows our providers to serve a high volume of patients. At this time  Patient  will continue to be seen for ongoing consultation and stabilization.    Signed:   Jovan Manjarrez, MSN, APRN, PMHNP-BC  Long Term Outpatient Psychiatry  Chart documentation done in part with Dragon Voice Recognition software.  Although reviewed after completion, some word and grammatical errors may remain. Answers submitted by the patient for this visit:  Patient Health Questionnaire (Submitted on 7/24/2023)  If you checked off any problems, how difficult have these problems made it for you to do your work, take care of things at home, or get along with other people?: Very difficult  PHQ9 TOTAL SCORE: 14

## 2023-07-24 NOTE — NURSING NOTE
Is the patient currently in the state of [place]? YES    Visit mode:VIDEO    If the visit is dropped, the patient can be reconnected by: VIDEO VISIT: Text to cell phone: [phone]    Will anyone else be joining the visit? NO      How would you like to obtain your AVS? MyChart    Are changes needed to the allergy or medication list? NO    Reason for visit: Consult

## 2023-08-22 ENCOUNTER — VIRTUAL VISIT (OUTPATIENT)
Dept: PSYCHIATRY | Facility: CLINIC | Age: 23
End: 2023-08-22
Payer: COMMERCIAL

## 2023-08-22 DIAGNOSIS — F41.9 ANXIETY: ICD-10-CM

## 2023-08-22 DIAGNOSIS — F33.41 RECURRENT MAJOR DEPRESSIVE DISORDER, IN PARTIAL REMISSION (H): Primary | ICD-10-CM

## 2023-08-22 PROCEDURE — 99213 OFFICE O/P EST LOW 20 MIN: CPT | Mod: VID | Performed by: NURSE PRACTITIONER

## 2023-08-22 NOTE — PATIENT INSTRUCTIONS
"Patient Education   The [facility]  What to Expect  Here's what to expect in the Panel Psych[facility] Program.   About the program  You'll be meeting with a psychiatric doctor to check your mental health. A psychiatric doctor helps you deal with troubling thoughts and feelings by giving you medicine. They'll make sure you know the plan for your care. You may see them for a long time. When you're feeling better, they may refer you back to seeing your family doctor.   If you have any questions, we'll be glad to talk to you.  About visits  Be open  At your visits, please talk openly about your problems. It may feel hard, but it's the best way for us to help you.  Cancelling visits  If you can't come to your visit, please call us right away at [phone]. If you don't cancel at least 24 hours (1 full day) before your visit, that's \"late cancellation.\"  Not showing up for your visits  Being very late is the same as not showing up. You'll be a \"no show\" if:  You're more than 15 minutes late for a 30-minute (half hour) visit.  You're more than 30 minutes late for a 60-minute (full hour) visit.  If you cancel late or don't show up 2 times within 6 months, we may end your care.  Getting help between visits  If you need help between visits, you can call us Monday to Friday from 8 a.m. to 4:30 p.m. at [phone].  Emergency care  Call 911 or go to the nearest emergency department if your life or someone else's life is in danger.  Call 988 anytime to reach the national Suicide and Crisis hotline.  Medicine refills  To refill your medicine, call your pharmacy. You can also call [facility] at [phone], Monday to Friday, 8 a.m. to 4:30 p.m. It can take 1 to 3 business days to get a refill.   Forms, letters, and tests  You may have papers to fill out, like FMLA, short-term disability, and workability. We can help you with these forms at your visits, but you must have an " appointment. You may need more than 1 visit for this, to be in an intensive therapy program, or both.  Before we can give you medicine for ADHD, we may refer you to get tested for it or confirm it another way.  We may not be able to give you an emotional support animal letter.  We don't do mental health checks ordered by the court.   We don't do mental health testing, but we can refer you to get tested.   Thank you for choosing us for your care.  For informational purposes only. Not to replace the advice of your health care provider. Copyright   2022 [facility]. All rights reserved. [employer] [id number] - 12/22.       1.Patient will take the medications as prescribed.   Medications: Continue Effexor 150 mg daily for depression and anxiety  Patient will not stop taking medications or adjust them without consulting with the provider.  2.Patient will call with any problems between visits.  3.Patient will go to the emergency room if not feeling safe , unable to function in the community, or if suicidal, homicidal or hearing voices or having paranoia.  4.Patient will abstain from drugs and alcohol./Pt denies use .  5.Patient will not drive if sedated on medications or under influence of any substance.  6.Patient will not mix psychiatric medications with drugs and alcohol.   7.Patient will watch his diet and exercise.  8.Patient will see non psychiatric providers for non psychiatric disorders.  9. Next appointment in 6 weeks

## 2023-08-22 NOTE — PROGRESS NOTES
"PSYCHIATRIC PROGRESS NOTE     Name:  Ashleigh Bustillo  : 2000    Ashleigh Bustillo is a 23 year old female who is being evaluated via a billable Video visit.      Telemedicine Visit: The patient's condition can be safely assessed and treated via synchronous audio and visual telemedicine encounter.      Reason for Telemedicine Visit: COVID 19 pandemic and the social and physical recommendations by the CDC and MD., Patient has requested telehealth visit, and Patient unable to travel      Originating Site (Patient Location): Patient's home    Distant Site (Provider Location): [facility] Outpatient Setting: [facility]    Consent:  The patient/guardian has verbally consented to: the potential risks and benefits of telemedicine (video visit or phone) versus in person care; bill my insurance or make self-payment for services provided; and responsibility for payment of non-covered services.     Mode of Communication:  [employer] platform     As the provider I attest to compliance with applicable laws and regulations related to telemedicine.    Date of Last Visit: 23                                          CHIEF COMPLAINT   \"I am doing better\"    HISTORY OF PRESENT ILLNESS     Patient who was last seen in the clinic on 23 returned today for follow up visit.  Patient reports she has noticed significant improvement in symptoms since last visit, stating she is now more motivated with good energy.  Patient reports she is no longer isolated and are being going outside accomplishing her goals.  Patient reports sometimes she still experiences increased anxiety about future while adjusting to early adulthood.  Patient reports both anxiety and residual depression are fairly managed with current medication at this time.  Patient stated she would like to remain on current medication dose as they are effectively helping with the symptoms.  Patient does mention she will be open to increasing medication dosage if " symptoms becomes worse in future.  Patient currently denies both suicidal homicidal ideation.  She also denied both auditory and visual hallucination.  Patient reported no marbella or hypomania.  Patient return to clinic in 6 weeks for follow-up.  PSYCHIATRIC HISTORY:   History of Psychiatric Hospitalizations:   - Inpatient: Once when she was 14 years old  - IOP/PHP/Day treatment: None  History of Suicidal Ideation: Positive   History of Suicide Attempts:Positive   History of Self-injurious Behavior: Denies a history of SIB.  Current:  No  History of Violence/Aggression: Negative  History of Commitment? Negative  Electroconvulsive Therapy (ECT):Negative    PSYCHIATRIC REVIEW OF SYSTEMS:   Psychiatric Review of Systems:   Depression:   Reports: depressed mood, suicidal ideation, decreased interest, changes in sleep, changes in appetite, guilt, hopelessness, helplessness, impaired concentration, decreased energy, irritability.  Marbella:   Denies: sleeplessness, increased goal-directed activities, abrupt increase in energy pressured speech  Psychosis:   Denies: visual hallucinations, auditory hallucinations, paranoia  Anxiety:   Reports: excessive worries that are difficult to control, panic attacks  PTSD:   Reports: re-experiencing past trauma, nightmares, increased arousal, avoidance of traumatic stimuli, impaired function.  Denies: re-experiencing past trauma, nightmares, increased arousal, avoidance of traumatic stimuli, impaired function.  OCD:   Denies: obsessions, checking, symmetry, cleaning, skin picking.  Eating Disorder:   Denies: restriction, binging, purging.    Sleep:       MEDICATIONS                                                                                                Current Outpatient Medications   Medication Sig    levonorgestrel-ethinyl estradiol (SEASONALE) 0.15-0.03 MG tablet Take 1 tablet by mouth daily    venlafaxine (EFFEXOR XR) 150 MG 24 hr capsule Take 1 capsule (150 mg) by mouth daily     "VITAMIN D PO      No current facility-administered medications for this visit.       DRUG MONITORING:  [place] Prescription Monitoring Program evaluating controlled substances in the last year in [place]:  The [place] Prescription Monitoring Program has been reviewed and there are no current concerns with: diversionary activity, early refill requests, and or obtaining the medication from multiple providers       PAST PSYCHOTROPIC MEDICATIONS:  Prozac, Effexor    VITALS   There were no vitals taken for this visit.     BP Readings from Last 1 Encounters:   06/15/23 100/60     Pulse Readings from Last 1 Encounters:   06/15/23 87     Wt Readings from Last 1 Encounters:   06/15/23 50.9 kg (112 lb 4.8 oz)     Ht Readings from Last 1 Encounters:   06/15/23 1.575 m (5' 2\")     Estimated body mass index is 20.54 kg/m  as calculated from the following:    Height as of 6/15/23: 1.575 m (5' 2\").    Weight as of 6/15/23: 50.9 kg (112 lb 4.8 oz).      PERTINENT HISTORY   PAST MEDICAL HISTORY:   Past Medical History:   Diagnosis Date    Anorexia 03/2022    Anxiety     Mild episode of recurrent major depressive disorder (H)     Suicide attempt (H)     at least once, hospitalized       PAST SURGICAL HISTORY: No past surgical history on file.    FAMILY HISTORY:   Family History   Problem Relation Age of Onset    Scoliosis Sister     Diabetes Maternal Grandmother        SOCIAL HISTORY:   Social History     Tobacco Use    Smoking status: Never     Passive exposure: Current    Smokeless tobacco: Never    Tobacco comments:     Once in awhile use   Substance Use Topics    Alcohol use: Yes     Comment: once a month drinks 1-2         Seizures or Head Injury: Denies history of head injury. Denies history of seizures.  History of cardiac disease, rheumatic fever, fainting or dizziness, especially with exercise, seizures, chest pain or shortness of breath with exercise, unexplained change in exercise tolerance, palpitations, high blood " pressure, or heart murmur?   No    LABS & IMAGING                                                                                                                Personally reviewed  Recent Labs   Lab Test 04/08/22  1219   WBC 6.4   HGB 13.1   HCT 38.5   MCV 88        Recent Labs   Lab Test 04/08/22  1219      POTASSIUM 3.9   CHLORIDE 107   CO2 21*   GLC 77   SUE 9.0   BUN 8   CR 0.64   GFRESTIMATED >90   ALBUMIN 3.9   PROTTOTAL 7.5   AST 14   ALT 10   ALKPHOS 59   BILITOTAL 0.6     Recent Labs   Lab Test 04/08/22  1219   CHOL 124   LDL 46   HDL 63   TRIG 73     Recent Labs   Lab Test 04/08/22  1219   TSH 2.21     No results found for: YFB765, PRYQ158, [id number], VITD3, D2VIT, D3VIT, DTOT, [id number], [id number], [id number], [id number], [id number], [id number]     ALLERGY & IMMUNIZATIONS     No Known Allergies    FAMILY MEDICAL HISTORY:     Family History       Problem (# of Occurrences) Relation (Name,Age of Onset)    Diabetes (1) Maternal Grandmother    Scoliosis (1) Sister              Family history of sudden or unexplained death or an event requiring resuscitation in children or young adults, cardiac arrhythmias (eg, Malika-Parkinson-White syndrome), long QT syndrome, catecholaminergic paroxysmal ventricular tachycardia, Brugada syndrome, arrhythmogenic right ventricular dysplasia, hypertrophic cardiomyopathy, dilated cardiomyopathy, or Marfan syndrome?  No    FAMILY PSYCHIATRIC HISTORY:   Psychiatry:Denies  Substance use history in family: Brother and day struggled with drug abuse  Family suicide history: Denies      SIGNIFICANT SOCIAL/FAMILY HISTORY:                                           Born and raised in: [place]  Relationship status: Single, has a boyfriend  Children: None    Highest education level was:currently in college   Service:Denies   Employment status: Part time working at the [employer]  LEGAL:Denies     SUBSTANCE USE HISTORY    Tobacco use: Vapes  "occasionally  Caffeine: Denies  Current alcohol: 2-3 drinks once weekly  Current substance use:Marijuana  Past use alcohol/substance use:Wendy      MEDICAL REVIEW OF SYSTEMS:   Ten system review was completed with pertinent positives noted above    MENTAL STATUS EXAM:   Mental Status Examination (limited due to video virtual visit format):  Vital Signs: There were no vitals taken for this virtual visit.  Appearance: adequately groomed, appears stated age, and in no apparent distress.  Attitude: cooperative   Eye Contact: good to the extent that can be determined in a video visit  Muscle Strength and Tone: no gross abnormalities based on remote observation  Psychomotor Behavior:  no evidence of tardive dyskinesia, dystonia, or tics based on remote observation  Gait and Station: normal, no gross abnormalities based on remote observation  Speech: clear, coherent, normal prosody, regular rate, regular rhythm and fluent  Associations: No loosening of associations  Thought Process: coherent and goal directed  Thought Content: no evidence of suicidal ideation or homicidal ideation, no evidence of psychotic thought, no auditory hallucinations present and no visual hallucinations present  Mood: \"good\"  Affect: appropriate and in normal range  Insight: good  Judgment: intact, adequate for safety  Impulse Control: intact  Oriented to: time, place, person and situation  Attention Span and Concentration: normal  Language: Intact  Recent and Remote Memory: intact to interview. Not formally assessed. No amnesia.  Fund of Knowledge: appropriate        SAFETY   Feels safe in home: Yes   Suicidal ideation: Denies  History of suicide attempts:  No   Hx of impulsivity: No     DSM 5 DIAGNOSIS:   1. Recurrent major depressive disorder, in partial remission (H)    2. Anxiety    ASSESSMENT AND PLAN    Patient is a 23 year old,   Not  or  female  with a history of generalized anxiety disorder, major depressive " disorder, and eating disorder female  who presents for follow up visit.she reports symptom stabilization on current medication regimen.  She would like to remain on Effexor  mg as symptoms are fairly under control.  She denies suicidal and homicidal ideations.  She also denied both auditory and visual hallucination.  Patient reported no sherley or hypomania.  Sleep and appetite are at baseline, improved and stabilized. RTC in 6 weeks    Plan:  1.Patient will take the medications as prescribed.   Medications: Continue Effexor 150 mg daily for depression and anxiety  Patient will not stop taking medications or adjust them without consulting with the provider.  2.Patient will call with any problems between visits.  3.Patient will go to the emergency room if not feeling safe , unable to function in the community, or if suicidal, homicidal or hearing voices or having paranoia.  4.Patient will abstain from drugs and alcohol./Pt denies use .  5.Patient will not drive if sedated on medications or under influence of any substance.  6.Patient will not mix psychiatric medications with drugs and alcohol.   7.Patient will watch his diet and exercise.  8.Patient will see non psychiatric providers for non psychiatric disorders.  9. Next appointment in 6 weeks    Risk Assessment:     Ashleigh has notable risk factors for self-harm, including, single status, anxiety and passive SI. However, risk is mitigated by ability to volunteer a safety plan and history of seeking help when needed. Additional steps taken to minimize risk include making medication adjustment, asking patient to call 911 and go to the ER if not able to stay safe at home,  Therefore, based on all available evidence including the factors cited above, Ashleigh does not appear to be an imminent danger to self or others and does not meet criteria 72 hour hold. However, if patient uses substances or is non-adherent with medication, their risk of decompensation and SI/HI  will be elevated. This was discussed with the patient as she verbalized good understanding.   CONSULTS/REFERRALS:   Continue therapy  None at this time  Coordinate care with therapist as needed    MEDICAL:   None at this time  Coordinate care with PCP (Lauren Sen) as needed  Follow up with primary care provider as planned or for acute medical concerns.    PSYCHOEDUCATION:  Medication side effects and alternatives reviewed. Health promotion activities recommended and reviewed today. All questions addressed. Education and counseling completed regarding risks and benefits of medications and psychotherapy options.  Consent provided by patient/guardian  Call the psychiatric nurse line with medication questions or concerns at [phone].  Dexcomhart may be used to communicate with your provider, but this is not intended to be used for emergencies.  BLACK BOX WARNING: Discussed the Food and Drug Administration (FDA) requires that all antidepressants carry a warning that some children, adolescents and young adults may be at increased risk of suicide when taking antidepressants. Anyone taking an antidepressant should be watched closely for worsening depression or unusual behavior especially in the first few weeks after starting an SSRI. Keep in mind, antidepressants are more likely to reduce suicide risk in the long run by improving mood.   SEROTONIN SYNDROME:  Discussed risks of Serotonin syndrome (ie, serotonin toxicity) which is a potentially life-threatening condition associated with increased serotonergic activity in the central nervous system (CNS). It is seen with therapeutic medication use, inadvertent interactions between drugs, and intentional self-poisoning. Serotonin syndrome may involve a spectrum of clinical findings, which often include mental status changes, autonomic hyperactivity, and neuromuscular abnormalities.    BENZODIAZEPINE:  discussion on how benzos work and the need to use them short term  due to potential of anxiety getting.  This is a controlled substance with risk for abuse, need to keep in a safe keep place and cannot replace lost scripts.    HYPNOTIC USE: Hypnotic use, risk for CNS depression, sleep-walking, not to mix with ETOH or other CNS depressant, need for six hours of sleep, stop if change in mood.  This is a controlled substance with risk for abuse, need to keep in a safe keep place and cannot replace lost scripts.  FIRST GENERATION ANTIPSYCHOTIC/ SECOND GENERATION ANTIPSYCHOTIC USE:  Atypical need for cardiometabolic monitoring with medication- B/P, weight, blood sugar, cholesterol.  Need to monitor for abnormal movements taught  SAFETY:  We all care about your loved one's safety. To reduce the risk of self-harm, remove access to all:  Firearms, Medicines (both prescribed and over-the-counter), Knives and other sharp objects, Ropes and like materials, and Alcohol  SLEEP HYGIENE: establish a sleep routine, limit screen time 1 hour prior to bed, use bed for sleep only, take sleep/medications on time (including sleepy time tea, trazadone or herbal treatments such as melatonin), aroma therapy, limit caffeine/sugar, yoga, guided imagery, stretch, meditation, limit naps to 20 minutes, make a temperature change in the room, white noise, be mindful of slowing down breathing, take a warm bath/shower, frequently wash sheets, and journaling.   Medlineplus.gov is information for patients.  It is run by the National Library of Medicine and it contains information about all disorders, diseases and all medications.      COMMUNITY RESOURCES:    CRISIS NUMBERS: Provided in AVS 7/24/2023  National Suicide Prevention Lifeline: 3-025-079-TALK (114-632-2843)  PackLink/resources for a list of additional resources (SOS)            [facility] - [phone]   [facility] Adult Mental Gpjgeb-[phone] mobile unit/ 24/7 crisis line  [facility] -[phone]   COPE   [facility] Mobile Team -[phone] (adults)/ [phone] (child)  Poison Control Center - 3-472-002-7970    OR  go to nearest ER  Crisis Text Line for any crisis  send this-   To: 268113   [facility] ([facility]) [facility] ER  [phone]  National Suicide Prevention Lifeline: 961.164.3190 (TTY: 198.643.3932). Call anytime for help.  (www.suicidepreventionlifeline.org)  National [place] [employer] Mental Illness (www.[place].org): 885.536.7814 or 166-161-5214.   Mental Health Association (www.mentalhealth.org): 435.604.2342 or 351-144-7715.  [place] Crisis Text Line: Text [place] to 431438  Suicide LifeLine Chat: suicideSurvios.org/chat    ADMINISTRATIVE BILLIN min spent interviewing patient, reviewing referral documents, obtaining and reviewing outside records, communication with other health specialists, and preparing this report on this day: 23    Video/Phone Start Time:  2:33 pm  Video/Phone End Time:   2:45 pm     Greater than 50% of time was spent in counseling and coordination of care regarding above diagnoses and treatment plan.    Patient Status:  Our psychiatry providers act as a specialty service for Primary Care Providers in the [facility] that seek to optimize medications for unstable patients.  Once medications have been optimized, our providers discharge the patient back to the referring Primary Care Provider for ongoing medication management.  This type of system allows our providers to serve a high volume of patients. At this time  Patient will continue to be seen for ongoing consultation and stabilization.    Signed:   Jovan Manjarrez, MSN, APRN, PMHNP-BC  Long Term Outpatient Psychiatry  Chart documentation done in part with Dragon Voice Recognition software.  Although reviewed after completion, some word and grammatical errors may remain. Answers submitted by the patient for this visit:  Patient Health Questionnaire (Submitted on 2023)  If you checked off any  problems, how difficult have these problems made it for you to do your work, take care of things at home, or get along with other people?: Very difficult  PHQ9 TOTAL SCORE: 14

## 2023-08-22 NOTE — NURSING NOTE
Is the patient currently in the state of [place]? YES    Visit mode:VIDEO    If the visit is dropped, the patient can be reconnected by: VIDEO VISIT: Text to cell phone:   Telephone Information:   Mobile [phone]       Will anyone else be joining the visit? NO  (If patient encounters technical issues they should call [phone])    How would you like to obtain your AVS? MyChart    Are changes needed to the allergy or medication list? No    Reason for visit: RECHECK    Fran BAILON

## 2023-08-22 NOTE — PROGRESS NOTES
Virtual Visit Details    Type of service:  Video Visit     Originating Location (pt. Location): Home    Distant Location (provider location):  Off-site  Platform used for Video Visit: Traci

## 2023-08-23 NOTE — PROGRESS NOTES
"PROGRESS NOTE   6/7/2017    SUBJECTIVE:  Ashleigh Bustillo is a 16 y.o. female  who presents for   Chief Complaint   Patient presents with     Travel Consult     going to [place] x 12 days     Patient comes in today prior to travel to [place].  She is leaving to go to [place] next week and will be gone for about 12 days. They will be going to [facility] as well as [facility] and [facility].  She is going with her mom and her sister and a quite a large group from school on a sightseeing trip.  They come in today to discuss immunizations needed prior to their departure next week.  She is overall healthy female.  She has not had a physical exam in a number of years we talked about the fact that she should come in for a physical exam but today we will definitely get her up-to-date with her vaccinations so that she can go traveling next week.  She feels like overall things are doing well and really has no concerns today.    Patient Active Problem List   Diagnosis     Suicide Attempt       Current Outpatient Prescriptions   Medication Sig Dispense Refill     typhoid (VIVOTIF) SR capsule Take 1 capsule by mouth every other day for 4 days. 4 capsule 0     No current facility-administered medications for this visit.        No Known Allergies    Past Medical History:   Diagnosis Date     Suicide attempt 08/2014    took overdose of diazepam and tried to jump off family's deck, hospitalized at [place]     Suicide attempt 02/2017    took advil and claritin, nontoxic amounts       History reviewed. No pertinent surgical history.    History   Smoking Status     Never Smoker   Smokeless Tobacco     Not on file       OBJECTIVE:     BP 96/58 (Patient Site: Left Arm, Patient Position: Sitting, Cuff Size: Adult Regular)  Pulse 80 Comment: regular  Temp 98.3  F (36.8  C) (Oral)   Resp 14 Comment: REGULAR  Ht 5' 1\" (1.549 m)  Wt 107 lb (48.5 kg)  BMI 20.22 kg/m2    Physical Exam:  GENERAL APPEARANCE: A&A, NAD, well hydrated, well " ----- Message from Margot Marrero MD sent at 8/22/2023  8:52 AM CDT -----  Please let patient know that results are unremarkable except as below   Low vitamin D: Recommend going on vitamin D. Rx vitamin D 50,000 units once a week for 12 weeks. #12 with no refills. Take over-the-counter vitamin D 2000 units daily after 3 months.  Vitamin B12 is low normal and consider taking vitamin B12 as discussed            nourished  SKIN:  Normal skin turgor, no lesions/rashes   EXTREMITY: no edema   NEURO: no gross deficits   PSYCHIATRIC;  Mood appropriate, memory intact      ASSESSMENT/PLAN:     Travel advice encounter [Z71.89]      1. Travel advice encounter  - typhoid (VIVOTIF) SR capsule; Take 1 capsule by mouth every other day for 4 days.  Dispense: 4 capsule; Refill: 0  - Hepatitis A vaccine pediatric / adolescent 2 dose IM    2. Exposure to communicable diseases  - typhoid (VIVOTIF) SR capsule; Take 1 capsule by mouth every other day for 4 days.  Dispense: 4 capsule; Refill: 0  - Hepatitis A vaccine pediatric / adolescent 2 dose IM    Patient overall is doing well.  She is not ill in any way at this time.  We did review her immunizations.  Immunizations are all up-to-date other than the fact that she is only had one hepatitis A vaccine.  We will go ahead and give her second hepatitis A vaccine today.  She otherwise is up-to-date with her other immunizations.  She does need typhoid vaccination and that prescription for oral vaccination was sent to the pharmacy today.  We did go over how to take this medication she takes every other day for 4 days.  Ideally this would be finished 1 week prior to her departure however she will not finish this until a couple of days prior to her departure.  We decided that it was more important for her to get this vaccination and at least she will have some protection during her visit there.  Hepatitis A vaccine was given today typhoid vaccine was sent to the pharmacy.  I did discuss this with mom who was present in the office today and she consented to all immunizations.  We did discuss the fact that patient has not been seen since 2014 here in the office and so definitely she should set up an appointment for physical exam yet this summer.  That appointment was set up prior to her leaving the clinic today.  We do not feel that malaria vaccine was needed as they are not traveling into a high risk  area and nor did we need [place] encephalitis as again she is not traveling there on a long-term basis.  Her other immunizations are up-to-date.  All of mom's questions were answered today.  Lauren Sen MD

## 2023-10-03 ENCOUNTER — VIRTUAL VISIT (OUTPATIENT)
Dept: PSYCHIATRY | Facility: CLINIC | Age: 23
End: 2023-10-03
Payer: COMMERCIAL

## 2023-10-03 DIAGNOSIS — F41.9 ANXIETY: ICD-10-CM

## 2023-10-03 DIAGNOSIS — F33.41 RECURRENT MAJOR DEPRESSIVE DISORDER, IN PARTIAL REMISSION (H): ICD-10-CM

## 2023-10-03 PROCEDURE — 99213 OFFICE O/P EST LOW 20 MIN: CPT | Mod: VID | Performed by: NURSE PRACTITIONER

## 2023-10-03 RX ORDER — VENLAFAXINE HYDROCHLORIDE 150 MG/1
150 CAPSULE, EXTENDED RELEASE ORAL DAILY
Qty: 90 CAPSULE | Refills: 1 | Status: SHIPPED | OUTPATIENT
Start: 2023-10-03 | End: 2024-04-15

## 2023-10-03 ASSESSMENT — PATIENT HEALTH QUESTIONNAIRE - PHQ9
10. IF YOU CHECKED OFF ANY PROBLEMS, HOW DIFFICULT HAVE THESE PROBLEMS MADE IT FOR YOU TO DO YOUR WORK, TAKE CARE OF THINGS AT HOME, OR GET ALONG WITH OTHER PEOPLE: NOT DIFFICULT AT ALL
SUM OF ALL RESPONSES TO PHQ QUESTIONS 1-9: 3
SUM OF ALL RESPONSES TO PHQ QUESTIONS 1-9: 3

## 2023-10-03 NOTE — PROGRESS NOTES
"PSYCHIATRIC PROGRESS NOTE     Name:  Ashleigh Bustillo  : 2000    Ashleigh Bustillo is a 23 year old female who is being evaluated via a billable Video visit.      Telemedicine Visit: The patient's condition can be safely assessed and treated via synchronous audio and visual telemedicine encounter.      Reason for Telemedicine Visit: COVID 19 pandemic and the social and physical recommendations by the CDC and MD., Patient has requested telehealth visit, and Patient unable to travel      Originating Site (Patient Location): Patient's home    Distant Site (Provider Location): [facility] Outpatient Setting: [facility]    Consent:  The patient/guardian has verbally consented to: the potential risks and benefits of telemedicine (video visit or phone) versus in person care; bill my insurance or make self-payment for services provided; and responsibility for payment of non-covered services.     Mode of Communication:  Salesfusion platform     As the provider I attest to compliance with applicable laws and regulations related to telemedicine.    Date of Last Visit: 23                                          CHIEF COMPLAINT   \"I am doing better\"    HISTORY OF PRESENT ILLNESS     Patient who was last seen in the clinic on 23 returned today for follow up visit.  Patient reports she continues to remain stabilized on current medication regimen which she is Effexor 150 mg, stating depression, anxiety, and mood are fairly managed with medication.  Patient states that she is now more motivated with good energy.  Patient states she has started enjoying her previous hobbies, like drawing, and coloring.  Patient reports she still works part-time. Patient currently denies both suicidal and homicidal ideation.  She also denied both auditory and visual hallucination.  Patient reported no sherley or hypomania.  Patient return to clinic in 6 weeks for follow-up.  PSYCHIATRIC HISTORY:   History of Psychiatric " Hospitalizations:   - Inpatient: Once when she was 14 years old  - IOP/PHP/Day treatment: None  History of Suicidal Ideation: Positive   History of Suicide Attempts:Positive   History of Self-injurious Behavior: Denies a history of SIB.  Current:  No  History of Violence/Aggression: Negative  History of Commitment? Negative  Electroconvulsive Therapy (ECT):Negative    PSYCHIATRIC REVIEW OF SYSTEMS:   Psychiatric Review of Systems:   Depression:   Reports: depressed mood, suicidal ideation, decreased interest, changes in sleep, changes in appetite, guilt, hopelessness, helplessness, impaired concentration, decreased energy, irritability.  [place]:   Denies: sleeplessness, increased goal-directed activities, abrupt increase in energy pressured speech  Psychosis:   Denies: visual hallucinations, auditory hallucinations, paranoia  Anxiety:   Reports: excessive worries that are difficult to control, panic attacks  PTSD:   Reports: re-experiencing past trauma, nightmares, increased arousal, avoidance of traumatic stimuli, impaired function.  Denies: re-experiencing past trauma, nightmares, increased arousal, avoidance of traumatic stimuli, impaired function.  OCD:   Denies: obsessions, checking, symmetry, cleaning, skin picking.  Eating Disorder:   Denies: restriction, binging, purging.    Sleep:       MEDICATIONS                                                                                                Current Outpatient Medications   Medication Sig    levonorgestrel-ethinyl estradiol (SEASONALE) 0.15-0.03 MG tablet Take 1 tablet by mouth daily    venlafaxine (EFFEXOR XR) 150 MG 24 hr capsule Take 1 capsule (150 mg) by mouth daily    VITAMIN D PO      No current facility-administered medications for this visit.       DRUG MONITORING:  [place] Prescription Monitoring Program evaluating controlled substances in the last year in [place]:  The [place] Prescription Monitoring Program has been reviewed and there are no  "current concerns with: diversionary activity, early refill requests, and or obtaining the medication from multiple providers       PAST PSYCHOTROPIC MEDICATIONS:  Prozac, Effexor    VITALS   There were no vitals taken for this visit.     BP Readings from Last 1 Encounters:   06/15/23 100/60     Pulse Readings from Last 1 Encounters:   06/15/23 87     Wt Readings from Last 1 Encounters:   06/15/23 50.9 kg (112 lb 4.8 oz)     Ht Readings from Last 1 Encounters:   06/15/23 1.575 m (5' 2\")     Estimated body mass index is 20.54 kg/m  as calculated from the following:    Height as of 6/15/23: 1.575 m (5' 2\").    Weight as of 6/15/23: 50.9 kg (112 lb 4.8 oz).      PERTINENT HISTORY   PAST MEDICAL HISTORY:   Past Medical History:   Diagnosis Date    Anorexia 03/2022    Anxiety     Mild episode of recurrent major depressive disorder (H24)     Suicide attempt (H)     at least once, hospitalized       PAST SURGICAL HISTORY: No past surgical history on file.    FAMILY HISTORY:   Family History   Problem Relation Age of Onset    Scoliosis Sister     Diabetes Maternal Grandmother        SOCIAL HISTORY:   Social History     Tobacco Use    Smoking status: Never     Passive exposure: Current    Smokeless tobacco: Never    Tobacco comments:     Once in awhile use   Substance Use Topics    Alcohol use: Yes     Comment: once a month drinks 1-2         Seizures or Head Injury: Denies history of head injury. Denies history of seizures.  History of cardiac disease, rheumatic fever, fainting or dizziness, especially with exercise, seizures, chest pain or shortness of breath with exercise, unexplained change in exercise tolerance, palpitations, high blood pressure, or heart murmur?   No    LABS & IMAGING                                                                                                                Personally reviewed  Recent Labs   Lab Test 04/08/22  1219   WBC 6.4   HGB 13.1   HCT 38.5   MCV 88        Recent Labs "   Lab Test 04/08/22  1219      POTASSIUM 3.9   CHLORIDE 107   CO2 21*   GLC 77   SUE 9.0   BUN 8   CR 0.64   GFRESTIMATED >90   ALBUMIN 3.9   PROTTOTAL 7.5   AST 14   ALT 10   ALKPHOS 59   BILITOTAL 0.6     Recent Labs   Lab Test 04/08/22  1219   CHOL 124   LDL 46   HDL 63   TRIG 73     Recent Labs   Lab Test 04/08/22  1219   TSH 2.21     No results found for: PMA953, HRZH422, SSNC82DSBLU, VITD3, D2VIT, D3VIT, DTOT, [id number], [id number], [id number], [id number], [id number], [id number]     ALLERGY & IMMUNIZATIONS     No Known Allergies    FAMILY MEDICAL HISTORY:     Family History       Problem (# of Occurrences) Relation (Name,Age of Onset)    Diabetes (1) Maternal Grandmother    Scoliosis (1) Sister              Family history of sudden or unexplained death or an event requiring resuscitation in children or young adults, cardiac arrhythmias (eg, Malika-Parkinson-White syndrome), long QT syndrome, catecholaminergic paroxysmal ventricular tachycardia, Brugada syndrome, arrhythmogenic right ventricular dysplasia, hypertrophic cardiomyopathy, dilated cardiomyopathy, or Marfan syndrome?  No    FAMILY PSYCHIATRIC HISTORY:   Psychiatry:Denies  Substance use history in family: Brother and day struggled with drug abuse  Family suicide history: Denies      SIGNIFICANT SOCIAL/FAMILY HISTORY:                                           Born and raised in: [place]  Relationship status: Single, has a boyfriend  Children: None    Highest education level was:currently in college   Service:Denies   Employment status: Part time working at the [employer]  LEGAL:Denies     SUBSTANCE USE HISTORY    Tobacco use: Vapes occasionally  Caffeine: Denies  Current alcohol: 2-3 drinks once weekly  Current substance use:Marijuana  Past use alcohol/substance use:Wendy      MEDICAL REVIEW OF SYSTEMS:   Ten system review was completed with pertinent positives noted above    MENTAL STATUS EXAM:   Mental Status  "Examination (limited due to video virtual visit format):  Vital Signs: There were no vitals taken for this virtual visit.  Appearance: adequately groomed, appears stated age, and in no apparent distress.  Attitude: cooperative   Eye Contact: good to the extent that can be determined in a video visit  Muscle Strength and Tone: no gross abnormalities based on remote observation  Psychomotor Behavior:  no evidence of tardive dyskinesia, dystonia, or tics based on remote observation  Gait and Station: normal, no gross abnormalities based on remote observation  Speech: clear, coherent, normal prosody, regular rate, regular rhythm and fluent  Associations: No loosening of associations  Thought Process: coherent and goal directed  Thought Content: no evidence of suicidal ideation or homicidal ideation, no evidence of psychotic thought, no auditory hallucinations present and no visual hallucinations present  Mood: \"good\"  Affect: appropriate and in normal range  Insight: good  Judgment: intact, adequate for safety  Impulse Control: intact  Oriented to: time, place, person and situation  Attention Span and Concentration: normal  Language: Intact  Recent and Remote Memory: intact to interview. Not formally assessed. No amnesia.  Fund of Knowledge: appropriate        SAFETY   Feels safe in home: Yes   Suicidal ideation: Denies  History of suicide attempts:  No   Hx of impulsivity: No     DSM 5 DIAGNOSIS:   1. Recurrent major depressive disorder, in partial remission (H)    2. Anxiety    ASSESSMENT AND PLAN    Patient is a 23 year old,   Not  or  female  with a history of generalized anxiety disorder, major depressive disorder, and eating disorder female  who presents for follow up visit.she reports symptom stabilization on current medication regimen.  She would like to remain on Effexor  mg as symptoms are fairly under control.  She denies suicidal and homicidal ideations.  She also denied both " auditory and visual hallucination.  Patient reported no sherley or hypomania.  Sleep and appetite are at baseline, improved and stabilized. RTC in 6 weeks    Plan:  1.Patient will take the medications as prescribed.   Medications: Continue Effexor 150 mg daily for depression and anxiety  Patient will not stop taking medications or adjust them without consulting with the provider.  2.Patient will call with any problems between visits.  3.Patient will go to the emergency room if not feeling safe , unable to function in the community, or if suicidal, homicidal or hearing voices or having paranoia.  4.Patient will abstain from drugs and alcohol./Pt denies use .  5.Patient will not drive if sedated on medications or under influence of any substance.  6.Patient will not mix psychiatric medications with drugs and alcohol.   7.Patient will watch his diet and exercise.  8.Patient will see non psychiatric providers for non psychiatric disorders.  9. Next appointment in 6 weeks    Risk Assessment:     Ashleigh has notable risk factors for self-harm, including, single status, anxiety and passive SI. However, risk is mitigated by ability to volunteer a safety plan and history of seeking help when needed. Additional steps taken to minimize risk include making medication adjustment, asking patient to call 911 and go to the ER if not able to stay safe at home,  Therefore, based on all available evidence including the factors cited above, Ashleigh does not appear to be an imminent danger to self or others and does not meet criteria 72 hour hold. However, if patient uses substances or is non-adherent with medication, their risk of decompensation and SI/HI will be elevated. This was discussed with the patient as she verbalized good understanding.   CONSULTS/REFERRALS:   Continue therapy  None at this time  Coordinate care with therapist as needed    MEDICAL:   None at this time  Coordinate care with PCP (Lauren Sen) as needed  Follow  up with primary care provider as planned or for acute medical concerns.    PSYCHOEDUCATION:  Medication side effects and alternatives reviewed. Health promotion activities recommended and reviewed today. All questions addressed. Education and counseling completed regarding risks and benefits of medications and psychotherapy options.  Consent provided by patient/guardian  Call the psychiatric nurse line with medication questions or concerns at [phone].  [place] may be used to communicate with your provider, but this is not intended to be used for emergencies.  BLACK BOX WARNING: Discussed the Food and Drug Administration (FDA) requires that all antidepressants carry a warning that some children, adolescents and young adults may be at increased risk of suicide when taking antidepressants. Anyone taking an antidepressant should be watched closely for worsening depression or unusual behavior especially in the first few weeks after starting an SSRI. Keep in mind, antidepressants are more likely to reduce suicide risk in the long run by improving mood.   SEROTONIN SYNDROME:  Discussed risks of Serotonin syndrome (ie, serotonin toxicity) which is a potentially life-threatening condition associated with increased serotonergic activity in the central nervous system (CNS). It is seen with therapeutic medication use, inadvertent interactions between drugs, and intentional self-poisoning. Serotonin syndrome may involve a spectrum of clinical findings, which often include mental status changes, autonomic hyperactivity, and neuromuscular abnormalities.    BENZODIAZEPINE:  discussion on how benzos work and the need to use them short term due to potential of anxiety getting.  This is a controlled substance with risk for abuse, need to keep in a safe keep place and cannot replace lost scripts.    HYPNOTIC USE: Hypnotic use, risk for CNS depression, sleep-walking, not to mix with ETOH or other CNS depressant, need for six hours  of sleep, stop if change in mood.  This is a controlled substance with risk for abuse, need to keep in a safe keep place and cannot replace lost scripts.  FIRST GENERATION ANTIPSYCHOTIC/ SECOND GENERATION ANTIPSYCHOTIC USE:  Atypical need for cardiometabolic monitoring with medication- B/P, weight, blood sugar, cholesterol.  Need to monitor for abnormal movements taught  SAFETY:  We all care about your loved one's safety. To reduce the risk of self-harm, remove access to all:  Firearms, Medicines (both prescribed and over-the-counter), Knives and other sharp objects, Ropes and like materials, and Alcohol  SLEEP HYGIENE: establish a sleep routine, limit screen time 1 hour prior to bed, use bed for sleep only, take sleep/medications on time (including sleepy time tea, trazadone or herbal treatments such as melatonin), aroma therapy, limit caffeine/sugar, yoga, guided imagery, stretch, meditation, limit naps to 20 minutes, make a temperature change in the room, white noise, be mindful of slowing down breathing, take a warm bath/shower, frequently wash sheets, and journaling.   Medlineplus.gov is information for patients.  It is run by the [employer] of [employer] and it contains information about all disorders, diseases and all medications.      COMMUNITY RESOURCES:    CRISIS NUMBERS: Provided in AVS 7/24/2023  National Suicide Prevention Lifeline: 1-800-273-talk (538.987.1032)  [employer]/resources for a list of additional resources (SOS)            [facility] - [phone]   Urgent Care Adult Mental Rcpfzy-[phone] mobile unit/ 24/7 crisis line  [facility] -[phone]   COPE 24/7 [place] Mobile Team -[phone] (adults)/ [phone] (child)  Poison Control Center - [phone]    OR  go to nearest ER  Crisis Text Line for any crisis 24/7 send this-   To: 153713   [facility] ([facility]) [facility]  [phone]  National Suicide Prevention Lifeline:  847.515.2072 (TTY: 761.965.7457). Call anytime for help.  (www.suicidepreventionlifeline.org)  National [place] on Mental Illness (www.[place].org): 583.156.5830 or 878-565-6607.   Mental Health Association (www.mentalhealth.org): 349.744.3882 or 741-168-8114.  [place] Crisis Text Line: Text [place] to 751596  Suicide LifeLine Chat: suicideSpeechVive.org/chat    ADMINISTRATIVE BILLIN min spent interviewing patient, reviewing referral documents, obtaining and reviewing outside records, communication with other health specialists, and preparing this report on this day: 10/3/23    Video/Phone Start Time:  2:33 pm  Video/Phone End Time:   2:45 pm     Greater than 50% of time was spent in counseling and coordination of care regarding above diagnoses and treatment plan.    Patient Status:  Our psychiatry providers act as a specialty service for Primary Care Providers in the [facility] that seek to optimize medications for unstable patients.  Once medications have been optimized, our providers discharge the patient back to the referring Primary Care Provider for ongoing medication management.  This type of system allows our providers to serve a high volume of patients. At this time  Patient will continue to be seen for ongoing consultation and stabilization.    Signed:   Jovan Manjarrez, MSN, APRN, PMHNP-BC  Long Term Outpatient Psychiatry  Chart documentation done in part with Dragon Voice Recognition software.  Although reviewed after completion, some word and grammatical errors may remain. Answers submitted by the patient for this visit:  Patient Health Questionnaire (Submitted on 2023)  If you checked off any problems, how difficult have these problems made it for you to do your work, take care of things at home, or get along with other people?: Very difficult  PHQ9 TOTAL SCORE: 14Answers submitted by the patient for this visit:  Patient Health Questionnaire (Submitted on 10/3/2023)  If you  checked off any problems, how difficult have these problems made it for you to do your work, take care of things at home, or get along with other people?: Not difficult at all  PHQ9 TOTAL SCORE: 3

## 2023-10-03 NOTE — PATIENT INSTRUCTIONS
"Patient Education   The [facility] Program  What to Expect  Here's what to expect in the Panel Psychiatry Program.   About the program  You'll be meeting with a psychiatric doctor to check your mental health. A psychiatric doctor helps you deal with troubling thoughts and feelings by giving you medicine. They'll make sure you know the plan for your care. You may see them for a long time. When you're feeling better, they may refer you back to seeing your family doctor.   If you have any questions, we'll be glad to talk to you.  About visits  Be open  At your visits, please talk openly about your problems. It may feel hard, but it's the best way for us to help you.  Cancelling visits  If you can't come to your visit, please call us right away at [phone]. If you don't cancel at least 24 hours (1 full day) before your visit, that's \"late cancellation.\"  Not showing up for your visits  Being very late is the same as not showing up. You'll be a \"no show\" if:  You're more than 15 minutes late for a 30-minute (half hour) visit.  You're more than 30 minutes late for a 60-minute (full hour) visit.  If you cancel late or don't show up 2 times within 6 months, we may end your care.  Getting help between visits  If you need help between visits, you can call us Monday to Friday from 8 a.m. to 4:30 p.m. at [phone].  Emergency care  Call 911 or go to the nearest emergency department if your life or someone else's life is in danger.  Call 988 anytime to reach the national Suicide and Crisis hotline.  Medicine refills  To refill your medicine, call your pharmacy. You can also call [facility]'s [facility]al [facility] at [phone], Monday to Friday, 8 a.m. to 4:30 p.m. It can take 1 to 3 business days to get a refill.   Forms, letters, and tests  You may have papers to fill out, like FMLA, short-term disability, and workability. We can help you with these forms at your visits, but you must have an " appointment. You may need more than 1 visit for this, to be in an intensive therapy program, or both.  Before we can give you medicine for ADHD, we may refer you to get tested for it or confirm it another way.  We may not be able to give you an emotional support animal letter.  We don't do mental health checks ordered by the court.   We don't do mental health testing, but we can refer you to get tested.   Thank you for choosing us for your care.  For informational purposes only. Not to replace the advice of your health care provider. Copyright   2022 [facility]. All rights reserved. [employer] [id number] - 12/22.       Plan:  1.Patient will take the medications as prescribed.   Medications: Continue Effexor 150 mg daily for depression and anxiety  Patient will not stop taking medications or adjust them without consulting with the provider.  2.Patient will call with any problems between visits.  3.Patient will go to the emergency room if not feeling safe , unable to function in the community, or if suicidal, homicidal or hearing voices or having paranoia.  4.Patient will abstain from drugs and alcohol./Pt denies use .  5.Patient will not drive if sedated on medications or under influence of any substance.  6.Patient will not mix psychiatric medications with drugs and alcohol.   7.Patient will watch his diet and exercise.  8.Patient will see non psychiatric providers for non psychiatric disorders.  9. Next appointment in 6 weeks

## 2023-10-03 NOTE — NURSING NOTE
Is the patient currently in the state of [place]? YES    Visit mode:VIDEO    If the visit is dropped, the patient can be reconnected by: VIDEO VISIT: Text to cell phone:   Telephone Information:   Mobile [phone]       Will anyone else be joining the visit? No  (If patient encounters technical issues they should call [phone])    How would you like to obtain your AVS? MyChart    Are changes needed to the allergy or medication list? No    Rooming Documentation: Assigned questionnaire(s) completed .    Reason for visit: RECHECK     UVALDO Cesar

## 2023-11-13 ASSESSMENT — PATIENT HEALTH QUESTIONNAIRE - PHQ9
SUM OF ALL RESPONSES TO PHQ QUESTIONS 1-9: 12
10. IF YOU CHECKED OFF ANY PROBLEMS, HOW DIFFICULT HAVE THESE PROBLEMS MADE IT FOR YOU TO DO YOUR WORK, TAKE CARE OF THINGS AT HOME, OR GET ALONG WITH OTHER PEOPLE: SOMEWHAT DIFFICULT
SUM OF ALL RESPONSES TO PHQ QUESTIONS 1-9: 12

## 2023-11-14 ENCOUNTER — VIRTUAL VISIT (OUTPATIENT)
Dept: PSYCHIATRY | Facility: CLINIC | Age: 23
End: 2023-11-14
Payer: COMMERCIAL

## 2023-11-14 DIAGNOSIS — F33.41 RECURRENT MAJOR DEPRESSIVE DISORDER, IN PARTIAL REMISSION (H): Primary | ICD-10-CM

## 2023-11-14 DIAGNOSIS — F41.9 ANXIETY: ICD-10-CM

## 2023-11-14 DIAGNOSIS — R63.0 ANOREXIA: ICD-10-CM

## 2023-11-14 PROCEDURE — 99213 OFFICE O/P EST LOW 20 MIN: CPT | Mod: VID | Performed by: NURSE PRACTITIONER

## 2023-11-14 RX ORDER — VENLAFAXINE HYDROCHLORIDE 75 MG/1
75 CAPSULE, EXTENDED RELEASE ORAL DAILY
Qty: 30 CAPSULE | Refills: 1 | Status: SHIPPED | OUTPATIENT
Start: 2023-11-14 | End: 2024-01-16

## 2023-11-14 NOTE — PATIENT INSTRUCTIONS
"Patient Education   The Panel [facility] Program  What to Expect  Here's what to expect in the Panel Psychiatry Program.   About the program  You'll be meeting with a psychiatric doctor to check your mental health. A psychiatric doctor helps you deal with troubling thoughts and feelings by giving you medicine. They'll make sure you know the plan for your care. You may see them for a long time. When you're feeling better, they may refer you back to seeing your family doctor.   If you have any questions, we'll be glad to talk to you.  About visits  Be open  At your visits, please talk openly about your problems. It may feel hard, but it's the best way for us to help you.  Cancelling visits  If you can't come to your visit, please call us right away at [phone]. If you don't cancel at least 24 hours (1 full day) before your visit, that's \"late cancellation.\"  Not showing up for your visits  Being very late is the same as not showing up. You'll be a \"no show\" if:  You're more than 15 minutes late for a 30-minute (half hour) visit.  You're more than 30 minutes late for a 60-minute (full hour) visit.  If you cancel late or don't show up 2 times within 6 months, we may end your care.  Getting help between visits  If you need help between visits, you can call us Monday to Friday from 8 a.m. to 4:30 p.m. at [phone].  Emergency care  Call 911 or go to the nearest emergency department if your life or someone else's life is in danger.  Call 988 anytime to reach the national Suicide and Crisis hotline.  Medicine refills  To refill your medicine, call your pharmacy. You can also call [facility]'s [facility] at [phone], Monday to Friday, 8 a.m. to 4:30 p.m. It can take 1 to 3 business days to get a refill.   Forms, letters, and tests  You may have papers to fill out, like FMLA, short-term disability, and workability. We can help you with these forms at your visits, but you must have an " appointment. You may need more than 1 visit for this, to be in an intensive therapy program, or both.  Before we can give you medicine for ADHD, we may refer you to get tested for it or confirm it another way.  We may not be able to give you an emotional support animal letter.  We don't do mental health checks ordered by the court.   We don't do mental health testing, but we can refer you to get tested.   Thank you for choosing us for your care.  For informational purposes only. Not to replace the advice of your health care provider. Copyright   2022 [facility]. All rights reserved. [employer] [id number] - 12/22.     Plan:  1.Patient will take the medications as prescribed.   Medications: Continue Effexor 150 mg daily in addition to EFFEXOR 75 mg making a total daily dose of 225 mg for depression and anxiety  Patient will not stop taking medications or adjust them without consulting with the provider.  2.Patient will call with any problems between visits.  3.Patient will go to the emergency room if not feeling safe , unable to function in the community, or if suicidal, homicidal or hearing voices or having paranoia.  4.Patient will abstain from drugs and alcohol./Pt denies use .  5.Patient will not drive if sedated on medications or under influence of any substance.  6.Patient will not mix psychiatric medications with drugs and alcohol.   7.Patient will watch his diet and exercise.  8.Patient will see non psychiatric providers for non psychiatric disorders.  9. Next appointment in 4 weeks

## 2023-11-14 NOTE — NURSING NOTE
Is the patient currently in the state of [place]? YES - at home.    Visit mode:VIDEO    If the visit is dropped, the patient can be reconnected by: VIDEO VISIT: Text to cell phone:   Telephone Information:   Mobile [phone]       Will anyone else be joining the visit? No  (If patient encounters technical issues they should call [phone])    How would you like to obtain your AVS? MyChart    Are changes needed to the allergy or medication list? Yes Medications flagged for removal.    Rooming Documentation: Attendance Guidelines - Care team has reviewed attendance agreement with patient. Patient advised that two failed appointments within 6 months may lead to termination of current episode of care.      Reason for visit: CATIE Wright, VVF

## 2023-11-14 NOTE — PROGRESS NOTES
Virtual Visit Details    Type of service:  Video Visit     Originating Location (pt. Location): Home    Distant Location (provider location):  Off-site  Platform used for Video Visit: KidAdmit        Answers submitted by the patient for this visit:  Patient Health Questionnaire (Submitted on 11/13/2023)  If you checked off any problems, how difficult have these problems made it for you to do your work, take care of things at home, or get along with other people?: Somewhat difficult  PHQ9 TOTAL SCORE: 12

## 2023-11-14 NOTE — PROGRESS NOTES
"PSYCHIATRIC PROGRESS NOTE     Name:  Ashleigh Bustillo  : 2000    Ashleigh Bustillo is a 23 year old female who is being evaluated via a billable Video visit.      Telemedicine Visit: The patient's condition can be safely assessed and treated via synchronous audio and visual telemedicine encounter.      Reason for Telemedicine Visit: COVID 19 pandemic and the social and physical recommendations by the CDC and MDH., Patient has requested telehealth visit, and Patient unable to travel      Originating Site (Patient Location): Patient's home    Distant Site (Provider Location): [facility]    Consent:  The patient/guardian has verbally consented to: the potential risks and benefits of telemedicine (video visit or phone) versus in person care; bill my insurance or make self-payment for services provided; and responsibility for payment of non-covered services.     Mode of Communication:  Soteira platform     As the provider I attest to compliance with applicable laws and regulations related to telemedicine.    Date of Last Visit: 10/3/23                                          CHIEF COMPLAINT   \"I am doing better\"    HISTORY OF PRESENT ILLNESS     Patient who was last seen in the clinic on 10/3/23 returned today for follow up visit.  Patient reports she has been struggling with worsening depression especially during period time.  Patient stated she is currently experiencing low mood, increased anxiety as well as passive suicidal thoughts whenever she is having period.  Patient reports she will be back to her baseline with less depressive symptoms after she finishes her period.  Patient reports she is currently not having any suicidal thoughts.  Patient also reports she has been working on quitting tobacco products.  Patient reports she only smokes whenever she is around her friends.  Patient reports she is currently not on any birth control.  I encouraged patient to follow-up " with her primary PCP for further assessment and possibly being on a birth control. I titrated Effexor to 225 mg by adding an additional 75 mg to current 150 mg dose to further help with worsening depression.  Patient verbalized good understanding and she was amenable to taking additional Effexor 75 mg with her current medications.  Time lately lately she also denied both auditory and visual hallucination.  Patient reported no marbella or hypomania.  Patient return to clinic in 6 weeks for follow-up.  PSYCHIATRIC HISTORY:   History of Psychiatric Hospitalizations:   - Inpatient: Once when she was 14 years old  - IOP/PHP/Day treatment: None  History of Suicidal Ideation: Positive   History of Suicide Attempts:Positive   History of Self-injurious Behavior: Denies a history of SIB.  Current:  No  History of Violence/Aggression: Negative  History of Commitment? Negative  Electroconvulsive Therapy (ECT):Negative    PSYCHIATRIC REVIEW OF SYSTEMS:   Psychiatric Review of Systems:   Depression:   Reports: depressed mood, suicidal ideation, decreased interest, changes in sleep, changes in appetite, guilt, hopelessness, helplessness, impaired concentration, decreased energy, irritability.  Marbella:   Denies: sleeplessness, increased goal-directed activities, abrupt increase in energy pressured speech  Psychosis:   Denies: visual hallucinations, auditory hallucinations, paranoia  Anxiety:   Reports: excessive worries that are difficult to control, panic attacks  PTSD:   Reports: re-experiencing past trauma, nightmares, increased arousal, avoidance of traumatic stimuli, impaired function.  Denies: re-experiencing past trauma, nightmares, increased arousal, avoidance of traumatic stimuli, impaired function.  OCD:   Denies: obsessions, checking, symmetry, cleaning, skin picking.  Eating Disorder:   Denies: restriction, binging, purging.    Sleep:       MEDICATIONS                                                                           "                      Current Outpatient Medications   Medication Sig    venlafaxine (EFFEXOR XR) 150 MG 24 hr capsule Take 1 capsule (150 mg) by mouth daily    VITAMIN D PO     levonorgestrel-ethinyl estradiol (SEASONALE) 0.15-0.03 MG tablet Take 1 tablet by mouth daily     No current facility-administered medications for this visit.       DRUG MONITORING:  [place] Prescription Monitoring Program evaluating controlled substances in the last year in [place]:  The [place] Prescription Monitoring Program has been reviewed and there are no current concerns with: diversionary activity, early refill requests, and or obtaining the medication from multiple providers       PAST PSYCHOTROPIC MEDICATIONS:  Prozac, Effexor    VITALS   There were no vitals taken for this visit.     BP Readings from Last 1 Encounters:   06/15/23 100/60     Pulse Readings from Last 1 Encounters:   06/15/23 87     Wt Readings from Last 1 Encounters:   06/15/23 50.9 kg (112 lb 4.8 oz)     Ht Readings from Last 1 Encounters:   06/15/23 1.575 m (5' 2\")     Estimated body mass index is 20.54 kg/m  as calculated from the following:    Height as of 6/15/23: 1.575 m (5' 2\").    Weight as of 6/15/23: 50.9 kg (112 lb 4.8 oz).      PERTINENT HISTORY   PAST MEDICAL HISTORY:   Past Medical History:   Diagnosis Date    Anorexia 03/2022    Anxiety     Mild episode of recurrent major depressive disorder (H24)     Suicide attempt (H)     at least once, hospitalized       PAST SURGICAL HISTORY: No past surgical history on file.    FAMILY HISTORY:   Family History   Problem Relation Age of Onset    Scoliosis Sister     Diabetes Maternal Grandmother        SOCIAL HISTORY:   Social History     Tobacco Use    Smoking status: Never     Passive exposure: Current    Smokeless tobacco: Never    Tobacco comments:     Once in awhile use   Substance Use Topics    Alcohol use: Yes     Comment: once a month drinks 1-2         Seizures or Head Injury: Denies history of head " "injury. Denies history of seizures.  History of cardiac disease, rheumatic fever, fainting or dizziness, especially with exercise, seizures, chest pain or shortness of breath with exercise, unexplained change in exercise tolerance, palpitations, high blood pressure, or heart murmur?   No    LABS & IMAGING                                                                                                                Personally reviewed  Recent Labs   Lab Test 04/08/22  1219   WBC 6.4   HGB 13.1   HCT 38.5   MCV 88        Recent Labs   Lab Test 04/08/22  1219      POTASSIUM 3.9   CHLORIDE 107   CO2 21*   GLC 77   SUE 9.0   BUN 8   CR 0.64   GFRESTIMATED >90   ALBUMIN 3.9   PROTTOTAL 7.5   AST 14   ALT 10   ALKPHOS 59   BILITOTAL 0.6     Recent Labs   Lab Test 04/08/22  1219   CHOL 124   LDL 46   HDL 63   TRIG 73     Recent Labs   Lab Test 04/08/22  1219   TSH 2.21     No results found for: \"OWS860\", \"YNVA732\", \"XBPH40GMZDX\", \"VITD3\", \"D2VIT\", \"D3VIT\", \"DTOT\", \"[id number]\", \"[id number]\", \"[id number]\", \"[id number]\", \"[id number]\", \"[id number]\"     ALLERGY & IMMUNIZATIONS     No Known Allergies    FAMILY MEDICAL HISTORY:     Family History       Problem (# of Occurrences) Relation (Name,Age of Onset)    Diabetes (1) Maternal Grandmother    Scoliosis (1) Sister              Family history of sudden or unexplained death or an event requiring resuscitation in children or young adults, cardiac arrhythmias (eg, Malika-Parkinson-White syndrome), long QT syndrome, catecholaminergic paroxysmal ventricular tachycardia, Brugada syndrome, arrhythmogenic right ventricular dysplasia, hypertrophic cardiomyopathy, dilated cardiomyopathy, or Marfan syndrome?  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  Appearance: adequately groomed, appears stated age, and in no apparent distress.  Attitude: cooperative   Eye Contact: good to the extent that can be determined in a video visit  Muscle Strength and Tone: no gross abnormalities based on remote observation  Psychomotor Behavior:  no evidence of tardive dyskinesia, dystonia, or tics based on remote observation  Gait and Station: normal, no gross abnormalities based on remote observation  Speech: clear, coherent, normal prosody, regular rate, regular rhythm and fluent  Associations: No loosening of associations  Thought Process: coherent and goal directed  Thought Content: no evidence of suicidal ideation or homicidal ideation, no evidence of psychotic thought, no auditory hallucinations present and no visual hallucinations present  Mood: \"good\"  Affect: appropriate and in normal range  Insight: good  Judgment: intact, adequate for safety  Impulse Control: intact  Oriented to: time, place, person and situation  Attention Span and Concentration: normal  Language: Intact  Recent and Remote Memory: intact to interview. Not formally assessed. No amnesia.  Fund of Knowledge: appropriate        SAFETY   Feels safe in home: Yes   Suicidal ideation: Denies  History of suicide attempts:  No   Hx of " impulsivity: No     DSM 5 DIAGNOSIS:   1. Recurrent major depressive disorder, in partial remission (H)    2. Anxiety    ASSESSMENT AND PLAN    Patient is a 23 year old,   Not  or  female  with a history of generalized anxiety disorder, major depressive disorder, and eating disorder female  who presents for follow up visit.she reports symptom stabilization on current medication regimen.  She has been experiencing worsening depression around  her period time.  She is not currently on any birth control.  I suggest that she follow-up with her PCP for further assessment.  I titrated Effexor to 225 mg to further help with worsening depression.  She endorsed passive SI in an intermittent basis but denies plan or intent.  She is currently working on quitting smoking.  She also denied both auditory and visual hallucination.  Patient reported no sherley or hypomania.  Sleep and appetite are at baseline, improved and stabilized. RTC in 6 weeks    Plan:  1.Patient will take the medications as prescribed.   Medications: Continue Effexor 150 mg daily in addition to EFFEXOR 75 mg making a total daily dose of 225 mg for depression and anxiety  Patient will not stop taking medications or adjust them without consulting with the provider.  2.Patient will call with any problems between visits.  3.Patient will go to the emergency room if not feeling safe , unable to function in the community, or if suicidal, homicidal or hearing voices or having paranoia.  4.Patient will abstain from drugs and alcohol./Pt denies use .  5.Patient will not drive if sedated on medications or under influence of any substance.  6.Patient will not mix psychiatric medications with drugs and alcohol.   7.Patient will watch his diet and exercise.  8.Patient will see non psychiatric providers for non psychiatric disorders.  9. Next appointment in 4 weeks    Risk Assessment:     Ashleigh has notable risk factors for self-harm, including, single  status, anxiety and passive SI. However, risk is mitigated by ability to volunteer a safety plan and history of seeking help when needed. Additional steps taken to minimize risk include making medication adjustment, asking patient to call 911 and go to the ER if not able to stay safe at home,  Therefore, based on all available evidence including the factors cited above, Ashleigh does not appear to be an imminent danger to self or others and does not meet criteria 72 hour hold. However, if patient uses substances or is non-adherent with medication, their risk of decompensation and SI/HI will be elevated. This was discussed with the patient as she verbalized good understanding.   CONSULTS/REFERRALS:   Continue therapy  None at this time  Coordinate care with therapist as needed    MEDICAL:   None at this time  Coordinate care with PCP (Lauren Sen) as needed  Follow up with primary care provider as planned or for acute medical concerns.    PSYCHOEDUCATION:  Medication side effects and alternatives reviewed. Health promotion activities recommended and reviewed today. All questions addressed. Education and counseling completed regarding risks and benefits of medications and psychotherapy options.  Consent provided by patient/guardian  Call the psychiatric nurse line with medication questions or concerns at [phone].  Hydrostorhart may be used to communicate with your provider, but this is not intended to be used for emergencies.  BLACK BOX WARNING: Discussed the Food and Drug Administration (FDA) requires that all antidepressants carry a warning that some children, adolescents and young adults may be at increased risk of suicide when taking antidepressants. Anyone taking an antidepressant should be watched closely for worsening depression or unusual behavior especially in the first few weeks after starting an SSRI. Keep in mind, antidepressants are more likely to reduce suicide risk in the long run by improving mood.    SEROTONIN SYNDROME:  Discussed risks of Serotonin syndrome (ie, serotonin toxicity) which is a potentially life-threatening condition associated with increased serotonergic activity in the central nervous system (CNS). It is seen with therapeutic medication use, inadvertent interactions between drugs, and intentional self-poisoning. Serotonin syndrome may involve a spectrum of clinical findings, which often include mental status changes, autonomic hyperactivity, and neuromuscular abnormalities.    BENZODIAZEPINE:  discussion on how benzos work and the need to use them short term due to potential of anxiety getting.  This is a controlled substance with risk for abuse, need to keep in a safe keep place and cannot replace lost scripts.    HYPNOTIC USE: Hypnotic use, risk for CNS depression, sleep-walking, not to mix with ETOH or other CNS depressant, need for six hours of sleep, stop if change in mood.  This is a controlled substance with risk for abuse, need to keep in a safe keep place and cannot replace lost scripts.  FIRST GENERATION ANTIPSYCHOTIC/ SECOND GENERATION ANTIPSYCHOTIC USE:  Atypical need for cardiometabolic monitoring with medication- B/P, weight, blood sugar, cholesterol.  Need to monitor for abnormal movements taught  SAFETY:  We all care about your loved one's safety. To reduce the risk of self-harm, remove access to all:  Firearms, Medicines (both prescribed and over-the-counter), Knives and other sharp objects, Ropes and like materials, and Alcohol  SLEEP HYGIENE: establish a sleep routine, limit screen time 1 hour prior to bed, use bed for sleep only, take sleep/medications on time (including sleepy time tea, trazadone or herbal treatments such as melatonin), aroma therapy, limit caffeine/sugar, yoga, guided imagery, stretch, meditation, limit naps to 20 minutes, make a temperature change in the room, white noise, be mindful of slowing down breathing, take a warm bath/shower, frequently wash  sheets, and journaling.   Medlineplus.gov is information for patients.  It is run by the [employer] and it contains information about all disorders, diseases and all medications.      COMMUNITY RESOURCES:    CRISIS NUMBERS: Provided in AVS 2023  National Suicide Prevention Lifeline: 4-062-991-TALK (156-757-9306)  ConnectQuest/resources for a list of additional resources (SOS)            [facility] - [phone]   Urgent Care Adult Mental Xmqvvh-[phone] mobile unit/  crisis line  [facility] -[phone]   COPE  [facility] Mobile Team -[phone] (adults)/ [phone] (child)  Poison Control Center - 1-178.837.8942    OR  go to nearest ER  Crisis Text Line for any crisis  send this-   To: 884586   [facility]  [phone]  National Suicide Prevention Lifeline: 490.217.9468 (TTY: 152.590.9769). Call anytime for help.  (www.suicidepreventionlifeline.org)  National [place] on Mental Illness (www.[place].org): 925.530.5235 or 185-617-3874.   Mental Health Association (www.mentalhealth.org): 645.761.6794 or 848-149-3276.  [place] Crisis Text Line: Text [place] to 820883  Suicide LifeLine Chat: suicidepreAppseeline.org/chat    ADMINISTRATIVE BILLIN min spent interviewing patient, reviewing referral documents, obtaining and reviewing outside records, communication with other health specialists, and preparing this report on this day: 23    Video/Phone Start Time:  2:34 pm  Video/Phone End Time:   2:48 pm     Greater than 50% of time was spent in counseling and coordination of care regarding above diagnoses and treatment plan.    Patient Status:  Our psychiatry providers act as a specialty service for Primary Care Providers in the [facility] that seek to optimize medications for unstable patients.  Once medications have been optimized, our providers discharge the patient back to the referring  Primary Care Provider for ongoing medication management.  This type of system allows our providers to serve a high volume of patients. At this time  Patient will continue to be seen for ongoing consultation and stabilization.    Signed:   Jovan Manjarrez, MSN, APRN, PMHNP-BC  Long Term Outpatient Psychiatry  Chart documentation done in part with Dragon Voice Recognition software.  Although reviewed after completion, some word and grammatical errors may remain. Answers submitted by the patient for this visit:  Patient Health Questionnaire (Submitted on 7/24/2023)  If you checked off any problems, how difficult have these problems made it for you to do your work, take care of things at home, or get along with other people?: Very difficult  PHQ9 TOTAL SCORE: 14Answers submitted by the patient for this visit:  Patient Health Questionnaire (Submitted on 10/3/2023)  If you checked off any problems, how difficult have these problems made it for you to do your work, take care of things at home, or get along with other people?: Not difficult at all  PHQ9 TOTAL SCORE: 3    Answers submitted by the patient for this visit:  Patient Health Questionnaire (Submitted on 11/13/2023)  If you checked off any problems, how difficult have these problems made it for you to do your work, take care of things at home, or get along with other people?: Somewhat difficult  PHQ9 TOTAL SCORE: 12

## 2023-12-11 ENCOUNTER — VIRTUAL VISIT (OUTPATIENT)
Dept: MIDWIFE SERVICES | Facility: CLINIC | Age: 23
End: 2023-12-11
Payer: COMMERCIAL

## 2023-12-11 DIAGNOSIS — Z30.011 ENCOUNTER FOR INITIAL PRESCRIPTION OF CONTRACEPTIVE PILLS: Primary | ICD-10-CM

## 2023-12-11 PROCEDURE — 99442 PR PHYSICIAN TELEPHONE EVALUATION 11-20 MIN: CPT | Mod: 95 | Performed by: ADVANCED PRACTICE MIDWIFE

## 2023-12-11 RX ORDER — LEVONORGESTREL AND ETHINYL ESTRADIOL 0.15-0.03
1 KIT ORAL DAILY
Qty: 91 TABLET | Refills: 3 | Status: SHIPPED | OUTPATIENT
Start: 2023-12-11

## 2023-12-11 NOTE — PROGRESS NOTES
Telemedicine Visit: The patient's condition can be safely assessed and treated via a telephone encounter.    Reason for Telemedicine Visit: Telephone     Originating Site (Patient Location): Patient's home      Distant Location (provider location):  On-site [facility]     Consent:  The patient/guardian has verbally consented to: the potential risks and benefits of telemedicine (video visit) versus in person care; bill my insurance or make self-payment for services provided; and responsibility for payment of non-covered services.     Mode of Communication:  Video Conference via [employer]    As the provider I attest to compliance with applicable laws and regulations related to telemedicine.    Ashleigh Bustillo 23 year old Patient's last menstrual period was 2023.      CC: Would like to restart COCs    HPI:  Has been on Seasonale in the last year, ran out of her prescription a couple of months ago but would like to restart.  The Seasonale worked well for her and she liked only having her menses 4 times a year.    Denies migraine with aura, hypertension concerns, clotting disorder hx or Ca history.    BP Readings from Last 6 Encounters:   06/15/23 100/60   23 110/80   23 106/68   22 114/60   22 102/64   22 110/64        A/P:  Healthy 24 y/o desires contraception and cycle management  (Z30.011) Encounter for initial prescription of contraceptive pills  (primary encounter diagnosis)  Comment:   Plan: levonorgestrel-ethinyl estradiol (SEASONALE)         0.15-0.03 MG tablet          Discussed starting now, use a back up contraceptive x 7 days.  Call with concerns or if she has BTB.  Pt stated understanding and agreement with plan.    Time spent on phone 12 minutes     SANTA Palencia CNM

## 2024-01-05 ENCOUNTER — MYC REFILL (OUTPATIENT)
Dept: PSYCHIATRY | Facility: CLINIC | Age: 24
End: 2024-01-05
Payer: COMMERCIAL

## 2024-01-05 DIAGNOSIS — F33.41 RECURRENT MAJOR DEPRESSIVE DISORDER, IN PARTIAL REMISSION (H): ICD-10-CM

## 2024-01-05 DIAGNOSIS — F41.9 ANXIETY: ICD-10-CM

## 2024-01-08 RX ORDER — VENLAFAXINE HYDROCHLORIDE 150 MG/1
150 CAPSULE, EXTENDED RELEASE ORAL DAILY
Qty: 90 CAPSULE | Refills: 1 | OUTPATIENT
Start: 2024-01-08

## 2024-01-08 NOTE — TELEPHONE ENCOUNTER
Reviewed MyChart refill request for venlafaxine (EFFEXOR XR) 150 MG 24 hr capsule.     Venlafaxine was last ordered on 10/3/23 for 90 capsules and 1 refill.     The patient should not need refills until 4/3/24.     This prescription refill will be DENIED    [facility] message sent to patient.

## 2024-01-15 ENCOUNTER — MYC REFILL (OUTPATIENT)
Dept: PSYCHIATRY | Facility: CLINIC | Age: 24
End: 2024-01-15
Payer: COMMERCIAL

## 2024-01-15 DIAGNOSIS — F33.41 RECURRENT MAJOR DEPRESSIVE DISORDER, IN PARTIAL REMISSION (H): ICD-10-CM

## 2024-01-15 DIAGNOSIS — F41.9 ANXIETY: ICD-10-CM

## 2024-01-15 RX ORDER — VENLAFAXINE HYDROCHLORIDE 75 MG/1
75 CAPSULE, EXTENDED RELEASE ORAL DAILY
Qty: 30 CAPSULE | Refills: 1 | OUTPATIENT
Start: 2024-01-15

## 2024-01-15 NOTE — TELEPHONE ENCOUNTER
Date of Last Office Visit: 1114/23  Date of Next Office Visit: 1/22/24  No shows since last visit: 0  Cancellations since last visit: 0    Medication requested: venlafaxine (EFFEXOR XR) 75 MG 24 hr Date last ordered: 11/14/23 Qty: 30 Refills: 1     [place] of [place] ?: NA    Lapse in medication adherence greater than 5 days?: No  If yes, call patient and gather details: Na  Medication refill request verified as identical to current order?: Yes  Result of Last DAM, VPA, Li+ Level, CBC, or Carbamazepine Level (at or since last visit): N/A    Last visit treatment plan: 11/14/23  Plan:  1.Patient will take the medications as prescribed.   Medications: Continue Effexor 150 mg daily in addition to EFFEXOR 75 mg making a total daily dose of 225 mg for depression and anxiety  Patient will not stop taking medications or adjust them without consulting with the provider.  2.Patient will call with any problems between visits.  3.Patient will go to the emergency room if not feeling safe , unable to function in the community, or if suicidal, homicidal or hearing voices or having paranoia.  4.Patient will abstain from drugs and alcohol./Pt denies use .  5.Patient will not drive if sedated on medications or under influence of any substance.  6.Patient will not mix psychiatric medications with drugs and alcohol.   7.Patient will watch his diet and exercise.  8.Patient will see non psychiatric providers for non psychiatric disorders.  9. Next appointment in 4 weeks    []Medication refilled per  Medication Refill in Ambulatory Care  policy.  [x]Medication unable to be refilled by RN due to criteria not met as indicated below:    []Eligibility - not seen in the last year   []Supervision - no future appointment   []Compliance - no shows, cancellations or lapse in therapy   []Verification - order discrepancy   []Controlled medication   []Medication not included in policy   []90-day supply request   [x]Other - needs appointment. Patient  should have enough refills to last until 1/23/24

## 2024-01-16 DIAGNOSIS — F41.9 ANXIETY: ICD-10-CM

## 2024-01-16 DIAGNOSIS — F33.41 RECURRENT MAJOR DEPRESSIVE DISORDER, IN PARTIAL REMISSION (H): ICD-10-CM

## 2024-01-16 RX ORDER — VENLAFAXINE HYDROCHLORIDE 75 MG/1
CAPSULE, EXTENDED RELEASE ORAL
Qty: 30 CAPSULE | Refills: 0 | Status: SHIPPED | OUTPATIENT
Start: 2024-01-16 | End: 2024-02-19

## 2024-01-16 NOTE — TELEPHONE ENCOUNTER
Date of Last Office Visit: 11/14/2023  Date of Next Office Visit: 1/22/2024  No shows since last visit: nne  Cancellations since last visit: none    Medication requested: venlafaxine (EFFEXOR XR) 75 MG 24 hr capsule  Date last ordered: 11/14/2023 Qty: 30 Refills: 1  Take 1 capsule (75 mg) by mouth daily In addition to EFFEXOR  MG making a total daily dose of 225 mg     [place] of [place] ?: No, this medication is not [place] on the [place]-    Lapse in medication adherence greater than 5 days?: No  If yes, call patient and gather details: n/a  Medication refill request verified as identical to current order?: Yes  Result of Last DAM, VPA, Li+ Level, CBC, or Carbamazepine Level (at or since last visit):  No new lab work completed since 11/14/2023 appt with Suresh Manjarrez NP    Last visit treatment plan:     ASSESSMENT AND PLAN    Patient is a 23 year old,   Not  or  female  with a history of generalized anxiety disorder, major depressive disorder, and eating disorder female  who presents for follow up visit.she reports symptom stabilization on current medication regimen.  She has been experiencing worsening depression around  her period time.  She is not currently on any birth control.  I suggest that she follow-up with her PCP for further assessment.  I titrated Effexor to 225 mg to further help with worsening depression.  She endorsed passive SI in an intermittent basis but denies plan or intent.  She is currently working on quitting smoking.  She also denied both auditory and visual hallucination.  Patient reported no sherley or hypomania.  Sleep and appetite are at baseline, improved and stabilized. RTC in 6 weeks     Plan:  1.Patient will take the medications as prescribed.   Medications: Continue Effexor 150 mg daily in addition to EFFEXOR 75 mg making a total daily dose of 225 mg for depression and anxiety  Patient will not stop taking medications or adjust them without consulting with the  provider.  2.Patient will call with any problems between visits.  3.Patient will go to the emergency room if not feeling safe , unable to function in the community, or if suicidal, homicidal or hearing voices or having paranoia.  4.Patient will abstain from drugs and alcohol./Pt denies use .  5.Patient will not drive if sedated on medications or under influence of any substance.  6.Patient will not mix psychiatric medications with drugs and alcohol.   7.Patient will watch his diet and exercise.  8.Patient will see non psychiatric providers for non psychiatric disorders.  9. Next appointment in 4 weeks    [x]Medication refilled per  Medication Refill in Ambulatory Care  policy.  []Medication unable to be refilled by RN due to criteria not met as indicated below:    []Eligibility - not seen in the last year   []Supervision - no future appointment   []Compliance - no shows, cancellations or lapse in therapy   []Verification - order discrepancy   []Controlled medication   []Medication not included in policy   []90-day supply request   []Other    Laura Nolan RN on 1/16/2024 at 2:51 PM

## 2024-01-21 ASSESSMENT — ANXIETY QUESTIONNAIRES
GAD7 TOTAL SCORE: 9
8. IF YOU CHECKED OFF ANY PROBLEMS, HOW DIFFICULT HAVE THESE MADE IT FOR YOU TO DO YOUR WORK, TAKE CARE OF THINGS AT HOME, OR GET ALONG WITH OTHER PEOPLE?: SOMEWHAT DIFFICULT
5. BEING SO RESTLESS THAT IT IS HARD TO SIT STILL: NOT AT ALL
GAD7 TOTAL SCORE: 9
7. FEELING AFRAID AS IF SOMETHING AWFUL MIGHT HAPPEN: SEVERAL DAYS
6. BECOMING EASILY ANNOYED OR IRRITABLE: SEVERAL DAYS
7. FEELING AFRAID AS IF SOMETHING AWFUL MIGHT HAPPEN: SEVERAL DAYS
1. FEELING NERVOUS, ANXIOUS, OR ON EDGE: MORE THAN HALF THE DAYS
IF YOU CHECKED OFF ANY PROBLEMS ON THIS QUESTIONNAIRE, HOW DIFFICULT HAVE THESE PROBLEMS MADE IT FOR YOU TO DO YOUR WORK, TAKE CARE OF THINGS AT HOME, OR GET ALONG WITH OTHER PEOPLE: SOMEWHAT DIFFICULT
3. WORRYING TOO MUCH ABOUT DIFFERENT THINGS: MORE THAN HALF THE DAYS
4. TROUBLE RELAXING: SEVERAL DAYS
2. NOT BEING ABLE TO STOP OR CONTROL WORRYING: MORE THAN HALF THE DAYS
GAD7 TOTAL SCORE: 9

## 2024-01-21 ASSESSMENT — PATIENT HEALTH QUESTIONNAIRE - PHQ9
SUM OF ALL RESPONSES TO PHQ QUESTIONS 1-9: 13
10. IF YOU CHECKED OFF ANY PROBLEMS, HOW DIFFICULT HAVE THESE PROBLEMS MADE IT FOR YOU TO DO YOUR WORK, TAKE CARE OF THINGS AT HOME, OR GET ALONG WITH OTHER PEOPLE: VERY DIFFICULT
SUM OF ALL RESPONSES TO PHQ QUESTIONS 1-9: 13

## 2024-01-22 ENCOUNTER — VIRTUAL VISIT (OUTPATIENT)
Dept: PSYCHIATRY | Facility: CLINIC | Age: 24
End: 2024-01-22
Payer: COMMERCIAL

## 2024-01-22 DIAGNOSIS — F33.1 MAJOR DEPRESSIVE DISORDER, RECURRENT EPISODE, MODERATE (H): Primary | ICD-10-CM

## 2024-01-22 DIAGNOSIS — F12.20 CANNABIS DEPENDENCE (H): ICD-10-CM

## 2024-01-22 DIAGNOSIS — F41.1 GAD (GENERALIZED ANXIETY DISORDER): ICD-10-CM

## 2024-01-22 PROCEDURE — 99214 OFFICE O/P EST MOD 30 MIN: CPT | Mod: 95 | Performed by: NURSE PRACTITIONER

## 2024-01-22 RX ORDER — ARIPIPRAZOLE 2 MG/1
2 TABLET ORAL DAILY
Qty: 30 TABLET | Refills: 1 | Status: SHIPPED | OUTPATIENT
Start: 2024-01-22 | End: 2024-02-19

## 2024-01-22 NOTE — PROGRESS NOTES
Virtual Visit Details    Type of service:  Video Visit     Originating Location (pt. Location): Home    Distant Location (provider location):  On-site  Platform used for Video Visit: Traci

## 2024-01-22 NOTE — PATIENT INSTRUCTIONS
"Patient Education   [facility]  What to Expect  Here's what to expect in the [facility].   About the program  You'll be meeting with a psychiatric doctor to check your mental health. A psychiatric doctor helps you deal with troubling thoughts and feelings by giving you medicine. They'll make sure you know the plan for your care. You may see them for a long time. When you're feeling better, they may refer you back to seeing your family doctor.   If you have any questions, we'll be glad to talk to you.  About visits  Be open  At your visits, please talk openly about your problems. It may feel hard, but it's the best way for us to help you.  Cancelling visits  If you can't come to your visit, please call us right away at [phone]. If you don't cancel at least 24 hours (1 full day) before your visit, that's \"late cancellation.\"  Not showing up for your visits  Being very late is the same as not showing up. You'll be a \"no show\" if:  You're more than 15 minutes late for a 30-minute (half hour) visit.  You're more than 30 minutes late for a 60-minute (full hour) visit.  If you cancel late or don't show up 2 times within 6 months, we may end your care.  Getting help between visits  If you need help between visits, you can call us Monday to Friday from 8 a.m. to 4:30 p.m. at [phone].  Emergency care  Call 911 or go to the nearest emergency department if your life or someone else's life is in danger.  Call 988 anytime to reach the national Suicide and Crisis hotline.  Medicine refills  To refill your medicine, call your pharmacy. You can also call [facility] at [phone], Monday to Friday, 8 a.m. to 4:30 p.m. It can take 1 to 3 business days to get a refill.   Forms, letters, and tests  You may have papers to fill out, like FMLA, short-term disability, and workability. We can help you with these forms at your visits, but you must have an " appointment. You may need more than 1 visit for this, to be in an intensive therapy program, or both.  Before we can give you medicine for ADHD, we may refer you to get tested for it or confirm it another way.  We may not be able to give you an emotional support animal letter.  We don't do mental health checks ordered by the court.   We don't do mental health testing, but we can refer you to get tested.   Thank you for choosing us for your care.  For informational purposes only. Not to replace the advice of your health care provider. Copyright   2022 [facility]. All rights reserved. [employer] [id number] - 12/22.     Plan:  1.Patient will take the medications as prescribed.   Medications: Continue Effexor 150 mg daily in addition to EFFEXOR 75 mg making a total daily dose of 225 mg for depression and anxiety  Take Abilify 2 mg daily for mood, depression and anxiety  Patient will not stop taking medications or adjust them without consulting with the provider.  2.Patient will call with any problems between visits.  3.Patient will go to the emergency room if not feeling safe , unable to function in the community, or if suicidal, homicidal or hearing voices or having paranoia.  4.Patient will abstain from drugs and alcohol./Pt denies use .  5.Patient will not drive if sedated on medications or under influence of any substance.  6.Patient will not mix psychiatric medications with drugs and alcohol.   7.Patient will watch his diet and exercise.  8.Patient will see non psychiatric providers for non psychiatric disorders.  9. Next appointment in 4 weeks

## 2024-01-22 NOTE — PROGRESS NOTES
"PSYCHIATRIC PROGRESS NOTE     Name:  Ashleigh Bustillo  : 2000    Ashleigh Bustillo is a 23 year old female who is being evaluated via a billable Video visit.      Telemedicine Visit: The patient's condition can be safely assessed and treated via synchronous audio and visual telemedicine encounter.      Reason for Telemedicine Visit: COVID 19 pandemic and the social and physical recommendations by the CDC and MD., Patient has requested telehealth visit, and Patient unable to travel      Originating Site (Patient Location): Patient's home    Distant Site (Provider Location): [facility] Outpatient Setting: [facility]    Consent:  The patient/guardian has verbally consented to: the potential risks and benefits of telemedicine (video visit or phone) versus in person care; bill my insurance or make self-payment for services provided; and responsibility for payment of non-covered services.     Mode of Communication:  [employer] platform     As the provider I attest to compliance with applicable laws and regulations related to telemedicine.    Date of Last Visit: 23                                          CHIEF COMPLAINT   \"I am doing better\"    HISTORY OF PRESENT ILLNESS     Patient who was last seen in the clinic on 23 returned today for follow up visit.  Patient reports she has not noticed much improvement in depression and anxiety since increasing Effexor to 225 mg.  Patient reports she is experiencing feeling down, low motivation, anhedonia, in addition to increased anxiety.  Patient also stated sometimes she feels as though people around her don't like her despite having no reason to believe that.  Patient stated she continues to engages in daily cannabis use as well as increased alcohol use.  Patient states that she usually finds it is difficult to say no to her friends whenever they offer her alcohol or cannabis.  Patient promised to cutting back on using substances.  I made patient aware " that mood and mind altering substances like cannabis and alcohol can make symptoms worse.  I also educated patient to stop taking alcohol when taking psychotropic medication.  I suggested starting patient on Abilify 2 mg to augment Effexor to further help with mood, depression, and anxiety.  I discussed medication side effect, risk, and benefit with the patient.  Patient verbalized good understanding and she was amenable to taking Abilify in addition to Effexor to further help with ongoing symptoms.  Patient endorsed passive SI but denies plan or intent.  Patient denies self-injurious behavior or homicidal ideation.  Time lately lately she also denied both auditory and visual hallucination.  Patient reported no marbella or hypomania.  Patient return to clinic in 6 weeks for follow-up.  PSYCHIATRIC HISTORY:   History of Psychiatric Hospitalizations:   - Inpatient: Once when she was 14 years old  - IOP/PHP/Day treatment: None  History of Suicidal Ideation: Positive   History of Suicide Attempts:Positive   History of Self-injurious Behavior: Denies a history of SIB.  Current:  No  History of Violence/Aggression: Negative  History of Commitment? Negative  Electroconvulsive Therapy (ECT):Negative    PSYCHIATRIC REVIEW OF SYSTEMS:   Psychiatric Review of Systems:   Depression:   Reports: depressed mood, suicidal ideation, decreased interest, changes in sleep, changes in appetite, guilt, hopelessness, helplessness, impaired concentration, decreased energy, irritability.  Marbella:   Denies: sleeplessness, increased goal-directed activities, abrupt increase in energy pressured speech  Psychosis:   Denies: visual hallucinations, auditory hallucinations, paranoia  Anxiety:   Reports: excessive worries that are difficult to control, panic attacks  PTSD:   Reports: re-experiencing past trauma, nightmares, increased arousal, avoidance of traumatic stimuli, impaired function.  Denies: re-experiencing past trauma, nightmares, increased  "arousal, avoidance of traumatic stimuli, impaired function.  OCD:   Denies: obsessions, checking, symmetry, cleaning, skin picking.  Eating Disorder:   Denies: restriction, binging, purging.    Sleep:       MEDICATIONS                                                                                                Current Outpatient Medications   Medication Sig    levonorgestrel-ethinyl estradiol (SEASONALE) 0.15-0.03 MG tablet Take 1 tablet by mouth daily    venlafaxine (EFFEXOR XR) 150 MG 24 hr capsule Take 1 capsule (150 mg) by mouth daily    venlafaxine (EFFEXOR XR) 75 MG 24 hr capsule TAKE 1 CAPSULE(75 MG) BY MOUTH DAILY IN ADDITION TO EFFEXOR  MG MAKING A. TOTAL DAILY DOSE  MG    VITAMIN D PO      No current facility-administered medications for this visit.       DRUG MONITORING:  [place] Prescription Monitoring Program evaluating controlled substances in the last year in [place]:  The [place] Prescription Monitoring Program has been reviewed and there are no current concerns with: diversionary activity, early refill requests, and or obtaining the medication from multiple providers       PAST PSYCHOTROPIC MEDICATIONS:  Prozac, Effexor    VITALS   [facility] 12/08/2023      BP Readings from Last 1 Encounters:   06/15/23 100/60     Pulse Readings from Last 1 Encounters:   06/15/23 87     Wt Readings from Last 1 Encounters:   06/15/23 50.9 kg (112 lb 4.8 oz)     Ht Readings from Last 1 Encounters:   06/15/23 1.575 m (5' 2\")     Estimated body mass index is 20.54 kg/m  as calculated from the following:    Height as of 6/15/23: 1.575 m (5' 2\").    Weight as of 6/15/23: 50.9 kg (112 lb 4.8 oz).      PERTINENT HISTORY   PAST MEDICAL HISTORY:   Past Medical History:   Diagnosis Date    Anorexia 03/2022    Anxiety     Mild episode of recurrent major depressive disorder (H24)     Suicide attempt (H)     at least once, hospitalized       PAST SURGICAL HISTORY: No past surgical history on file.    FAMILY HISTORY: " "  Family History   Problem Relation Age of Onset    Scoliosis Sister     Diabetes Maternal Grandmother        SOCIAL HISTORY:   Social History     Tobacco Use    Smoking status: Never     Passive exposure: Current    Smokeless tobacco: Never    Tobacco comments:     Once in awhile use   Substance Use Topics    Alcohol use: Yes     Comment: once a month drinks 1-2         Seizures or Head Injury: Denies history of head injury. Denies history of seizures.  History of cardiac disease, rheumatic fever, fainting or dizziness, especially with exercise, seizures, chest pain or shortness of breath with exercise, unexplained change in exercise tolerance, palpitations, high blood pressure, or heart murmur?   No    LABS & IMAGING                                                                                                                Personally reviewed  Recent Labs   Lab Test 04/08/22  1219   WBC 6.4   HGB 13.1   HCT 38.5   MCV 88        Recent Labs   Lab Test 04/08/22  1219      POTASSIUM 3.9   CHLORIDE 107   CO2 21*   GLC 77   SUE 9.0   BUN 8   CR 0.64   GFRESTIMATED >90   ALBUMIN 3.9   PROTTOTAL 7.5   AST 14   ALT 10   ALKPHOS 59   BILITOTAL 0.6     Recent Labs   Lab Test 04/08/22  1219   CHOL 124   LDL 46   HDL 63   TRIG 73     Recent Labs   Lab Test 04/08/22  1219   TSH 2.21     No results found for: \"DZA736\", \"GYVX410\", \"PDTZ94HYXZT\", \"VITD3\", \"D2VIT\", \"D3VIT\", \"DTOT\", \"CP43106415\", \"[id number]\", \"[id number]\", \"[id number]\", \"[id number]\", \"[id number]\"     ALLERGY & IMMUNIZATIONS     No Known Allergies    FAMILY MEDICAL HISTORY:     Family History       Problem (# of Occurrences) Relation (Name,Age of Onset)    Diabetes (1) Maternal Grandmother    Scoliosis (1) Sister              Family history of sudden or unexplained death or an event requiring resuscitation in children or young adults, cardiac arrhythmias (eg, Malika-Parkinson-White syndrome), long QT syndrome, catecholaminergic paroxysmal ventricular " "tachycardia, Brugada syndrome, arrhythmogenic right ventricular dysplasia, hypertrophic cardiomyopathy, dilated cardiomyopathy, or Marfan syndrome?  No    FAMILY PSYCHIATRIC HISTORY:   Psychiatry:Denies  Substance use history in family: Brother and day struggled with drug abuse  Family suicide history: Denies      SIGNIFICANT SOCIAL/FAMILY HISTORY:                                           Born and raised in: [place]  Relationship status: Single, has a boyfriend  Children: None    Highest education level was:currently in college   Service:Denies   Employment status: Part time working at the [employer]  LEGAL:Denies     SUBSTANCE USE HISTORY    Tobacco use: Vapes occasionally  Caffeine: Denies  Current alcohol: 2-3 drinks once weekly  Current substance use:Marijuana  Past use alcohol/substance use:Wendy      MEDICAL REVIEW OF SYSTEMS:   Ten system review was completed with pertinent positives noted above    MENTAL STATUS EXAM:   Mental Status Examination (limited due to video virtual visit format):  Vital Signs: There were no vitals taken for this virtual visit.  Appearance: adequately groomed, appears stated age, and in no apparent distress.  Attitude: cooperative   Eye Contact: good to the extent that can be determined in a video visit  Muscle Strength and Tone: no gross abnormalities based on remote observation  Psychomotor Behavior:  no evidence of tardive dyskinesia, dystonia, or tics based on remote observation  Gait and Station: normal, no gross abnormalities based on remote observation  Speech: clear, coherent, normal prosody, regular rate, regular rhythm and fluent  Associations: No loosening of associations  Thought Process: coherent and goal directed  Thought Content: no evidence of suicidal ideation or homicidal ideation, no evidence of psychotic thought, no auditory hallucinations present and no visual hallucinations present  Mood: \"good\"  Affect: appropriate and in normal " range  Insight: good  Judgment: intact, adequate for safety  Impulse Control: intact  Oriented to: time, place, person and situation  Attention Span and Concentration: normal  Language: Intact  Recent and Remote Memory: intact to interview. Not formally assessed. No amnesia.  Fund of Knowledge: appropriate        SAFETY   Feels safe in home: Yes   Suicidal ideation: Denies  History of suicide attempts:  No   Hx of impulsivity: No     DSM 5 DIAGNOSIS:   1. Recurrent major depressive disorder, in partial remission (H)    2. Anxiety    ASSESSMENT AND PLAN    Patient is a 23 year old,   Not  or  female  with a history of generalized anxiety disorder, major depressive disorder, and eating disorder female  who presents for follow up visit.she reports symptom stabilization on current medication regimen.  Patient continues to engaging in cannabis and alcohol use daily.  She is having a difficult to resisting using the substances whenever offered by friends.  Today, she reports not noticing improvement in anxiety and depression since increasing Effexor to 225 mg.  She is still feeling down with low motivation and low energy.  She also endorsed some paranoia about people around her don't like her, apparently due to increased cannabis use.  I started her on Abilify 2 mg to augment Effexor and further help with mood, depression, and anxiety.  I provided education about adverse effects of using mood and mind altering substances like, based on alcohol as they can make  symptoms worse.  She endorsed passive SI but denied plans or intent.  She denies self-injurious behaviors and HI.  She also denied both auditory and visual hallucination.  Patient reported no sherley or hypomania.  Sleep and appetite are at baseline, improved and stabilized. RTC in 6 weeks    Plan:  1.Patient will take the medications as prescribed.   Medications: Continue Effexor 150 mg daily in addition to EFFEXOR 75 mg making a total daily dose  of 225 mg for depression and anxiety  Take Abilify 2 mg daily for mood, depression and anxiety  Patient will not stop taking medications or adjust them without consulting with the provider.  2.Patient will call with any problems between visits.  3.Patient will go to the emergency room if not feeling safe , unable to function in the community, or if suicidal, homicidal or hearing voices or having paranoia.  4.Patient will abstain from drugs and alcohol./Pt denies use .  5.Patient will not drive if sedated on medications or under influence of any substance.  6.Patient will not mix psychiatric medications with drugs and alcohol.   7.Patient will watch his diet and exercise.  8.Patient will see non psychiatric providers for non psychiatric disorders.  9. Next appointment in 4 weeks    Risk Assessment:     Ashleigh has notable risk factors for self-harm, including, single status, anxiety and passive SI. However, risk is mitigated by ability to volunteer a safety plan and history of seeking help when needed. Additional steps taken to minimize risk include making medication adjustment, asking patient to call 911 and go to the ER if not able to stay safe at home,  Therefore, based on all available evidence including the factors cited above, Ashleigh does not appear to be an imminent danger to self or others and does not meet criteria 72 hour hold. However, if patient uses substances or is non-adherent with medication, their risk of decompensation and SI/HI will be elevated. This was discussed with the patient as she verbalized good understanding.   CONSULTS/REFERRALS:   Continue therapy  None at this time  Coordinate care with therapist as needed    MEDICAL:   None at this time  Coordinate care with PCP (Lauren Sen) as needed  Follow up with primary care provider as planned or for acute medical concerns.    PSYCHOEDUCATION:  Medication side effects and alternatives reviewed. Health promotion activities recommended and  reviewed today. All questions addressed. Education and counseling completed regarding risks and benefits of medications and psychotherapy options.  Consent provided by patient/guardian  Call the psychiatric nurse line with medication questions or concerns at [phone].  aScentiashart may be used to communicate with your provider, but this is not intended to be used for emergencies.  BLACK BOX WARNING: Discussed the Food and Drug Administration (FDA) requires that all antidepressants carry a warning that some children, adolescents and young adults may be at increased risk of suicide when taking antidepressants. Anyone taking an antidepressant should be watched closely for worsening depression or unusual behavior especially in the first few weeks after starting an SSRI. Keep in mind, antidepressants are more likely to reduce suicide risk in the long run by improving mood.   SEROTONIN SYNDROME:  Discussed risks of Serotonin syndrome (ie, serotonin toxicity) which is a potentially life-threatening condition associated with increased serotonergic activity in the central nervous system (CNS). It is seen with therapeutic medication use, inadvertent interactions between drugs, and intentional self-poisoning. Serotonin syndrome may involve a spectrum of clinical findings, which often include mental status changes, autonomic hyperactivity, and neuromuscular abnormalities.    BENZODIAZEPINE:  discussion on how benzos work and the need to use them short term due to potential of anxiety getting.  This is a controlled substance with risk for abuse, need to keep in a safe keep place and cannot replace lost scripts.    HYPNOTIC USE: Hypnotic use, risk for CNS depression, sleep-walking, not to mix with ETOH or other CNS depressant, need for six hours of sleep, stop if change in mood.  This is a controlled substance with risk for abuse, need to keep in a safe keep place and cannot replace lost scripts.  FIRST GENERATION ANTIPSYCHOTIC/  SECOND GENERATION ANTIPSYCHOTIC USE:  Atypical need for cardiometabolic monitoring with medication- B/P, weight, blood sugar, cholesterol.  Need to monitor for abnormal movements taught  SAFETY:  We all care about your loved one's safety. To reduce the risk of self-harm, remove access to all:  Firearms, Medicines (both prescribed and over-the-counter), Knives and other sharp objects, Ropes and like materials, and Alcohol  SLEEP HYGIENE: establish a sleep routine, limit screen time 1 hour prior to bed, use bed for sleep only, take sleep/medications on time (including sleepy time tea, trazadone or herbal treatments such as melatonin), aroma therapy, limit caffeine/sugar, yoga, guided imagery, stretch, meditation, limit naps to 20 minutes, make a temperature change in the room, white noise, be mindful of slowing down breathing, take a warm bath/shower, frequently wash sheets, and journaling.   Medlineplus.gov is information for patients.  It is run by the [employer] of Medicine and it contains information about all disorders, diseases and all medications.      COMMUNITY RESOURCES:    CRISIS NUMBERS: Provided in AVS 7/24/2023  National Suicide Prevention Lifeline: 1-330-260-TALK (559-957-0488)  HomeWellness/resources for a list of additional resources (SOS)            [facility] - [phone]   Urgent Care Adult Mental Ebxghw-[phone] mobile unit/ 24/7 crisis line  [facility] -[phone]   COPE 24/7 [facility] Mobile Team -[phone] (adults)/ [phone] (child)  Poison Control Center - 1-712.825.3722    OR  go to nearest ER  Crisis Text Line for any crisis 24/7 send this-   To: 991128   [facility] ([facility]) [facility] ER  [phone]  National Suicide Prevention Lifeline: 687.132.2991 (TTY: 768.233.8296). Call anytime for help.  (www.suicidepreventionlifeline.org)  National [place] [employer] Mental Illness (www.colby.org): 445.733.7302 or 659-859-1790.   Mental  Health Association (www.mentalhealth.org): 653-902-4890 or 665-196-9919.  [place] Crisis Text Line: Text [place] to 777116  Suicide LifeLine Chat: suicidepreventionlifeline.org/chat    ADMINISTRATIVE BILLIN min spent interviewing patient, reviewing referral documents, obtaining and reviewing outside records, communication with other health specialists, and preparing this report on this day: 24    Video/Phone Start Time:  1135  Video/Phone End Time:   1149    Greater than 50% of time was spent in counseling and coordination of care regarding above diagnoses and treatment plan.    Patient Status:  Our psychiatry providers act as a specialty service for Primary Care Providers in the [facility] that seek to optimize medications for unstable patients.  Once medications have been optimized, our providers discharge the patient back to the referring Primary Care Provider for ongoing medication management.  This type of system allows our providers to serve a high volume of patients. At this time  Patient will continue to be seen for ongoing consultation and stabilization.    Signed:   Jovan Manjarrez, MSN, APRN, PMHNP-BC  Long Term Outpatient Psychiatry  Chart documentation done in part with Dragon Voice Recognition software.  Although reviewed after completion, some word and grammatical errors may remain. Answers submitted by the patient for this visit:  Patient Health Questionnaire (Submitted on 2023)  If you checked off any problems, how difficult have these problems made it for you to do your work, take care of things at home, or get along with other people?: Very difficult  PHQ9 TOTAL SCORE: 14Answers submitted by the patient for this visit:  Patient Health Questionnaire (Submitted on 10/3/2023)  If you checked off any problems, how difficult have these problems made it for you to do your work, take care of things at home, or get along with other people?: Not difficult at all  PHQ9 TOTAL SCORE:  3    Answers submitted by the patient for this visit:  Patient Health Questionnaire (Submitted on 11/13/2023)  If you checked off any problems, how difficult have these problems made it for you to do your work, take care of things at home, or get along with other people?: Somewhat difficult  PHQ9 TOTAL SCORE: 12    Answers submitted by the patient for this visit:  Patient Health Questionnaire (Submitted on 1/21/2024)  If you checked off any problems, how difficult have these problems made it for you to do your work, take care of things at home, or get along with other people?: Very difficult  PHQ9 TOTAL SCORE: 13  ASIA-7 (Submitted on 1/21/2024)  ASIA 7 TOTAL SCORE: 9

## 2024-01-22 NOTE — NURSING NOTE
Is the patient currently in the state of [place]? YES    Visit mode:VIDEO    If the visit is dropped, the patient can be reconnected by: VIDEO VISIT: Text to cell phone:   Telephone Information:   Mobile [phone]       Will anyone else be joining the visit? NO  (If patient encounters technical issues they should call [phone])    How would you like to obtain your AVS? MyChart    Are changes needed to the allergy or medication list? No    Reason for visit: RECHECK    Yumiko BAILON

## 2024-02-19 ENCOUNTER — VIRTUAL VISIT (OUTPATIENT)
Dept: PSYCHIATRY | Facility: CLINIC | Age: 24
End: 2024-02-19
Payer: COMMERCIAL

## 2024-02-19 DIAGNOSIS — F12.20 CANNABIS DEPENDENCE (H): Primary | ICD-10-CM

## 2024-02-19 DIAGNOSIS — F33.1 MAJOR DEPRESSIVE DISORDER, RECURRENT EPISODE, MODERATE (H): ICD-10-CM

## 2024-02-19 DIAGNOSIS — F41.9 ANXIETY: ICD-10-CM

## 2024-02-19 DIAGNOSIS — F41.1 GAD (GENERALIZED ANXIETY DISORDER): ICD-10-CM

## 2024-02-19 DIAGNOSIS — F33.41 RECURRENT MAJOR DEPRESSIVE DISORDER, IN PARTIAL REMISSION (H): ICD-10-CM

## 2024-02-19 PROCEDURE — 99214 OFFICE O/P EST MOD 30 MIN: CPT | Mod: 95 | Performed by: NURSE PRACTITIONER

## 2024-02-19 RX ORDER — VENLAFAXINE HYDROCHLORIDE 75 MG/1
CAPSULE, EXTENDED RELEASE ORAL
Qty: 30 CAPSULE | Refills: 1 | Status: SHIPPED | OUTPATIENT
Start: 2024-02-19 | End: 2024-04-15

## 2024-02-19 RX ORDER — ARIPIPRAZOLE 2 MG/1
2 TABLET ORAL DAILY
Qty: 30 TABLET | Refills: 2 | Status: SHIPPED | OUTPATIENT
Start: 2024-02-19 | End: 2024-05-10

## 2024-02-19 ASSESSMENT — PAIN SCALES - GENERAL: PAINLEVEL: NO PAIN (0)

## 2024-02-19 NOTE — PROGRESS NOTES
"PSYCHIATRIC PROGRESS NOTE     Name:  Ashleigh Bustillo  : 2000    Ashleigh Bustillo is a 23 year old female who is being evaluated via a billable Video visit.      Telemedicine Visit: The patient's condition can be safely assessed and treated via synchronous audio and visual telemedicine encounter.      Reason for Telemedicine Visit: COVID 19 pandemic and the social and physical recommendations by the CDC and MD., Patient has requested telehealth visit, and Patient unable to travel      Originating Site (Patient Location): Patient's home    Distant Site (Provider Location): [facility] Setting: [facility]    Consent:  The patient/guardian has verbally consented to: the potential risks and benefits of telemedicine (video visit or phone) versus in person care; bill my insurance or make self-payment for services provided; and responsibility for payment of non-covered services.     Mode of Communication:  My eShoe platform     As the provider I attest to compliance with applicable laws and regulations related to telemedicine.    Date of Last Visit: 24                                          CHIEF COMPLAINT   \"I am doing better\"    HISTORY OF PRESENT ILLNESS     Patient who was last seen in the clinic on 24 returned today for follow up visit.  Patient reports she has noticed significant improvement in symptoms since taking Abilify 2 mg.  Patient stating depressive symptoms as well as anxiety have gotten a lot better since last visit.  Patient reports she is more motivated with good energy.  Patient also states that she is no longer worry excessively like before.  Patient does mention she still engages in marijuana and alcohol use whenever she hangs out with her friends.  Patient states that she tends to worry too much about how people perceive her whenever she engages in alcohol drinking.  Patient reports she plans to attend AA meeting next week.  Patient currently denies both suicidal " and homicidal ideation.  Patient also denies SIB.  Patient reported no marbella or hypomania.  Patient return to clinic in 6 weeks for follow-up.  PSYCHIATRIC HISTORY:   History of Psychiatric Hospitalizations:   - Inpatient: Once when she was 14 years old  - IOP/PHP/Day treatment: None  History of Suicidal Ideation: Positive   History of Suicide Attempts:Positive   History of Self-injurious Behavior: Denies a history of SIB.  Current:  No  History of Violence/Aggression: Negative  History of Commitment? Negative  Electroconvulsive Therapy (ECT):Negative    PSYCHIATRIC REVIEW OF SYSTEMS:   Psychiatric Review of Systems:   Depression:   Reports: depressed mood, suicidal ideation, decreased interest, changes in sleep, changes in appetite, guilt, hopelessness, helplessness, impaired concentration, decreased energy, irritability.  Marbella:   Denies: sleeplessness, increased goal-directed activities, abrupt increase in energy pressured speech  Psychosis:   Denies: visual hallucinations, auditory hallucinations, paranoia  Anxiety:   Reports: excessive worries that are difficult to control, panic attacks  PTSD:   Reports: re-experiencing past trauma, nightmares, increased arousal, avoidance of traumatic stimuli, impaired function.  Denies: re-experiencing past trauma, nightmares, increased arousal, avoidance of traumatic stimuli, impaired function.  OCD:   Denies: obsessions, checking, symmetry, cleaning, skin picking.  Eating Disorder:   Denies: restriction, binging, purging.    Sleep:       MEDICATIONS                                                                                                Current Outpatient Medications   Medication Sig    ARIPiprazole (ABILIFY) 2 MG tablet Take 1 tablet (2 mg) by mouth daily    levonorgestrel-ethinyl estradiol (SEASONALE) 0.15-0.03 MG tablet Take 1 tablet by mouth daily    venlafaxine (EFFEXOR XR) 150 MG 24 hr capsule Take 1 capsule (150 mg) by mouth daily    venlafaxine (EFFEXOR XR)  "75 MG 24 hr capsule TAKE 1 CAPSULE(75 MG) BY MOUTH DAILY IN ADDITION TO EFFEXOR  MG MAKING A. TOTAL DAILY DOSE  MG    VITAMIN D PO      No current facility-administered medications for this visit.       DRUG MONITORING:  [place] Prescription Monitoring Program evaluating controlled substances in the last year in [place]:  The [place] Prescription Monitoring Program has been reviewed and there are no current concerns with: diversionary activity, early refill requests, and or obtaining the medication from multiple providers       PAST PSYCHOTROPIC MEDICATIONS:  Prozac, Effexor    VITALS   There were no vitals taken for this visit.     BP Readings from Last 1 Encounters:   06/15/23 100/60     Pulse Readings from Last 1 Encounters:   06/15/23 87     Wt Readings from Last 1 Encounters:   06/15/23 50.9 kg (112 lb 4.8 oz)     Ht Readings from Last 1 Encounters:   06/15/23 1.575 m (5' 2\")     Estimated body mass index is 20.54 kg/m  as calculated from the following:    Height as of 6/15/23: 1.575 m (5' 2\").    Weight as of 6/15/23: 50.9 kg (112 lb 4.8 oz).      PERTINENT HISTORY   PAST MEDICAL HISTORY:   Past Medical History:   Diagnosis Date    Anorexia 03/2022    Anxiety     Mild episode of recurrent major depressive disorder (H24)     Suicide attempt (H)     at least once, hospitalized       PAST SURGICAL HISTORY: No past surgical history on file.    FAMILY HISTORY:   Family History   Problem Relation Age of Onset    Scoliosis Sister     Diabetes Maternal Grandmother        SOCIAL HISTORY:   Social History     Tobacco Use    Smoking status: Never     Passive exposure: Current    Smokeless tobacco: Never    Tobacco comments:     Once in awhile use   Substance Use Topics    Alcohol use: Yes     Comment: once a month drinks 1-2         Seizures or Head Injury: Denies history of head injury. Denies history of seizures.  History of cardiac disease, rheumatic fever, fainting or dizziness, especially with exercise, " "seizures, chest pain or shortness of breath with exercise, unexplained change in exercise tolerance, palpitations, high blood pressure, or heart murmur?   No    LABS & IMAGING                                                                                                                Personally reviewed  Recent Labs   Lab Test 04/08/22  1219   WBC 6.4   HGB 13.1   HCT 38.5   MCV 88        Recent Labs   Lab Test 04/08/22  1219      POTASSIUM 3.9   CHLORIDE 107   CO2 21*   GLC 77   SUE 9.0   BUN 8   CR 0.64   GFRESTIMATED >90   ALBUMIN 3.9   PROTTOTAL 7.5   AST 14   ALT 10   ALKPHOS 59   BILITOTAL 0.6     Recent Labs   Lab Test 04/08/22  1219   CHOL 124   LDL 46   HDL 63   TRIG 73     Recent Labs   Lab Test 04/08/22  1219   TSH 2.21     No results found for: \"ITF819\", \"JZRV543\", \"UDZA54GYRMH\", \"VITD3\", \"D2VIT\", \"D3VIT\", \"DTOT\", \"[id number]\", \"[id number]\", \"[id number]\", \"[id number]\", \"[id number]\", \"[id number]\"     ALLERGY & IMMUNIZATIONS     No Known Allergies    FAMILY MEDICAL HISTORY:     Family History       Problem (# of Occurrences) Relation (Name,Age of Onset)    Diabetes (1) Maternal Grandmother    Scoliosis (1) Sister              Family history of sudden or unexplained death or an event requiring resuscitation in children or young adults, cardiac arrhythmias (eg, Malika-Parkinson-White syndrome), long QT syndrome, catecholaminergic paroxysmal ventricular tachycardia, Brugada syndrome, arrhythmogenic right ventricular dysplasia, hypertrophic cardiomyopathy, dilated cardiomyopathy, or Marfan syndrome?  No    FAMILY PSYCHIATRIC HISTORY:   Psychiatry:Denies  Substance use history in family: Brother and day struggled with drug abuse  Family suicide history: Denies      SIGNIFICANT SOCIAL/FAMILY HISTORY:                                           Born and raised in: [place]  Relationship status: Single, has a boyfriend  Children: None    Highest education level was:currently in college   " "Service:Denies   Employment status: Part time working at the [employer]  LEGAL:Denies     SUBSTANCE USE HISTORY    Tobacco use: Vapes occasionally  Caffeine: Denies  Current alcohol: 2-3 drinks once weekly  Current substance use:Marijuana  Past use alcohol/substance use:Wendy      MEDICAL REVIEW OF SYSTEMS:   Ten system review was completed with pertinent positives noted above    MENTAL STATUS EXAM:   Mental Status Examination (limited due to video virtual visit format):  Vital Signs: There were no vitals taken for this virtual visit.  Appearance: adequately groomed, appears stated age, and in no apparent distress.  Attitude: cooperative   Eye Contact: good to the extent that can be determined in a video visit  Muscle Strength and Tone: no gross abnormalities based on remote observation  Psychomotor Behavior:  no evidence of tardive dyskinesia, dystonia, or tics based on remote observation  Gait and Station: normal, no gross abnormalities based on remote observation  Speech: clear, coherent, normal prosody, regular rate, regular rhythm and fluent  Associations: No loosening of associations  Thought Process: coherent and goal directed  Thought Content: no evidence of suicidal ideation or homicidal ideation, no evidence of psychotic thought, no auditory hallucinations present and no visual hallucinations present  Mood: \"good\"  Affect: appropriate and in normal range  Insight: good  Judgment: intact, adequate for safety  Impulse Control: intact  Oriented to: time, place, person and situation  Attention Span and Concentration: normal  Language: Intact  Recent and Remote Memory: intact to interview. Not formally assessed. No amnesia.  Fund of Knowledge: appropriate        SAFETY   Feels safe in home: Yes   Suicidal ideation: Denies  History of suicide attempts:  No   Hx of impulsivity: No     DSM 5 DIAGNOSIS:   1. Recurrent major depressive disorder, in partial remission (H)    2. Anxiety    ASSESSMENT AND " PLAN    Patient is a 23 year old,   Not  or  female  with a history of generalized anxiety disorder, major depressive disorder, and eating disorder female  who presents for follow up visit.  Patient continues to engaging in cannabis and alcohol use daily.  She is having a difficult to resisting using the substances whenever offered by friends.  Today, she reports noticing improvement in anxiety and depression since taking Abilify 2 mg.  She is still feeling more motivated with good energy.  She plans to start attending AA meetings next week.  She denies SI, SIB, and HI.  She denies self-injurious behaviors and HI.  She also denied both auditory and visual hallucination.  Patient reported no sherley or hypomania.  Sleep and appetite are at baseline, improved and stabilized. RTC in 6 weeks    Plan:  1.Patient will take the medications as prescribed.   Medications: Continue Effexor 150 mg daily in addition to EFFEXOR 75 mg making a total daily dose of 225 mg for depression and anxiety  Continue Abilify 2 mg daily for mood, depression and anxiety  Patient will not stop taking medications or adjust them without consulting with the provider.  2.Patient will call with any problems between visits.  3.Patient will go to the emergency room if not feeling safe , unable to function in the community, or if suicidal, homicidal or hearing voices or having paranoia.  4.Patient will abstain from drugs and alcohol./Pt denies use .  5.Patient will not drive if sedated on medications or under influence of any substance.  6.Patient will not mix psychiatric medications with drugs and alcohol.   7.Patient will watch his diet and exercise.  8.Patient will see non psychiatric providers for non psychiatric disorders.  9. Next appointment in 6 weeks    Risk Assessment:     Ashleigh has notable risk factors for self-harm, including, single status, anxiety and passive SI. However, risk is mitigated by ability to volunteer a safety  plan and history of seeking help when needed. Additional steps taken to minimize risk include making medication adjustment, asking patient to call 911 and go to the ER if not able to stay safe at home,  Therefore, based on all available evidence including the factors cited above, Ashleigh does not appear to be an imminent danger to self or others and does not meet criteria 72 hour hold. However, if patient uses substances or is non-adherent with medication, their risk of decompensation and SI/HI will be elevated. This was discussed with the patient as she verbalized good understanding.   CONSULTS/REFERRALS:   Continue therapy  None at this time  Coordinate care with therapist as needed    MEDICAL:   None at this time  Coordinate care with PCP (Lauren Sen) as needed  Follow up with primary care provider as planned or for acute medical concerns.    PSYCHOEDUCATION:  Medication side effects and alternatives reviewed. Health promotion activities recommended and reviewed today. All questions addressed. Education and counseling completed regarding risks and benefits of medications and psychotherapy options.  Consent provided by patient/guardian  Call the psychiatric nurse line with medication questions or concerns at [phone].  Zulamahart may be used to communicate with your provider, but this is not intended to be used for emergencies.  BLACK BOX WARNING: Discussed the Food and Drug Administration (FDA) requires that all antidepressants carry a warning that some children, adolescents and young adults may be at increased risk of suicide when taking antidepressants. Anyone taking an antidepressant should be watched closely for worsening depression or unusual behavior especially in the first few weeks after starting an SSRI. Keep in mind, antidepressants are more likely to reduce suicide risk in the long run by improving mood.   SEROTONIN SYNDROME:  Discussed risks of Serotonin syndrome (ie, serotonin toxicity) which  is a potentially life-threatening condition associated with increased serotonergic activity in the central nervous system (CNS). It is seen with therapeutic medication use, inadvertent interactions between drugs, and intentional self-poisoning. Serotonin syndrome may involve a spectrum of clinical findings, which often include mental status changes, autonomic hyperactivity, and neuromuscular abnormalities.    BENZODIAZEPINE:  discussion on how benzos work and the need to use them short term due to potential of anxiety getting.  This is a controlled substance with risk for abuse, need to keep in a safe keep place and cannot replace lost scripts.    HYPNOTIC USE: Hypnotic use, risk for CNS depression, sleep-walking, not to mix with ETOH or other CNS depressant, need for six hours of sleep, stop if change in mood.  This is a controlled substance with risk for abuse, need to keep in a safe keep place and cannot replace lost scripts.  FIRST GENERATION ANTIPSYCHOTIC/ SECOND GENERATION ANTIPSYCHOTIC USE:  Atypical need for cardiometabolic monitoring with medication- B/P, weight, blood sugar, cholesterol.  Need to monitor for abnormal movements taught  SAFETY:  We all care about your loved one's safety. To reduce the risk of self-harm, remove access to all:  Firearms, Medicines (both prescribed and over-the-counter), Knives and other sharp objects, Ropes and like materials, and Alcohol  SLEEP HYGIENE: establish a sleep routine, limit screen time 1 hour prior to bed, use bed for sleep only, take sleep/medications on time (including sleepy time tea, trazadone or herbal treatments such as melatonin), aroma therapy, limit caffeine/sugar, yoga, guided imagery, stretch, meditation, limit naps to 20 minutes, make a temperature change in the room, white noise, be mindful of slowing down breathing, take a warm bath/shower, frequently wash sheets, and journaling.   Medlineplus.gov is information for patients.  It is run by the  National Library of Medicine and it contains information about all disorders, diseases and all medications.      COMMUNITY RESOURCES:    CRISIS NUMBERS: Provided in AVS 2023  National Suicide Prevention Lifeline: 2-728-305-TALK (571-410-6087)  [place].com/resources for a list of additional resources (SOS)            [facility] - [phone]   Urgent Care Adult Mental Avczvv-[phone] mobile unit/  crisis line  [facility] -[phone]   COPE  [place] Mobile Team -[phone] (adults)/ [phone] (child)  Poison Control Center - 1-160.215.7536    OR  go to nearest ER  Crisis Text Line for any crisis  send this-   To: 579815   [facility] ([facility]) [facility]  [phone]  National Suicide Prevention Lifeline: 106.212.8627 (TTY: 908.536.5435). Call anytime for help.  (www.suicidepreventionlifeline.org)  National [place] on Mental Illness (www.[place].org): 455-984-1633 or 983-781-5591.   Mental Health Association (www.mentalhealth.org): 677.982.1197 or 413-035-9131.  [place] Crisis Text Line: Text [place] to 244336  Suicide LifeLine Chat: suicideMotionsoftline.org/chat    ADMINISTRATIVE BILLIN min spent interviewing patient, reviewing referral documents, obtaining and reviewing outside records, communication with other health specialists, and preparing this report on this day: 24    Video/Phone Start Time:  2:33 pm  Video/Phone End Time:   2:43 pm    Greater than 50% of time was spent in counseling and coordination of care regarding above diagnoses and treatment plan.    Patient Status:  Our psychiatry providers act as a specialty service for Primary Care Providers in the [facility] that seek to optimize medications for unstable patients.  Once medications have been optimized, our providers discharge the patient back to the referring Primary Care Provider for ongoing medication management.  This type of system allows our  providers to serve a high volume of patients. At this time  Patient will continue to be seen for ongoing consultation and stabilization.    Signed:   Jovan Manjarrez, MSN, APRN, PMHNP-BC  [facility]  Chart documentation done in part with Dragon Voice Recognition software.  Although reviewed after completion, some word and grammatical errors may remain. Answers submitted by the patient for this visit:  Patient Health Questionnaire (Submitted on 7/24/2023)  If you checked off any problems, how difficult have these problems made it for you to do your work, take care of things at home, or get along with other people?: Very difficult  PHQ9 TOTAL SCORE: 14Answers submitted by the patient for this visit:  Patient Health Questionnaire (Submitted on 10/3/2023)  If you checked off any problems, how difficult have these problems made it for you to do your work, take care of things at home, or get along with other people?: Not difficult at all  PHQ9 TOTAL SCORE: 3    Answers submitted by the patient for this visit:  Patient Health Questionnaire (Submitted on 11/13/2023)  If you checked off any problems, how difficult have these problems made it for you to do your work, take care of things at home, or get along with other people?: Somewhat difficult  PHQ9 TOTAL SCORE: 12    Answers submitted by the patient for this visit:  Patient Health Questionnaire (Submitted on 1/21/2024)  If you checked off any problems, how difficult have these problems made it for you to do your work, take care of things at home, or get along with other people?: Very difficult  PHQ9 TOTAL SCORE: 13  ASIA-7 (Submitted on 1/21/2024)  ASIA 7 TOTAL SCORE: 9

## 2024-02-19 NOTE — NURSING NOTE
Is the patient currently in the state of [place]? YES    Visit mode:VIDEO    If the visit is dropped, the patient can be reconnected by: VIDEO VISIT: Text to cell phone:   Telephone Information:   Mobile [phone]       Will anyone else be joining the visit? NO  (If patient encounters technical issues they should call [phone])    How would you like to obtain your AVS? MyChart    Are changes needed to the allergy or medication list? No    Reason for visit: RECHECK    Fran BAILON

## 2024-02-19 NOTE — PATIENT INSTRUCTIONS
"Patient Education   The [facility] Program  What to Expect  Here's what to expect in the Panel Psychiatry Program.   About the program  You'll be meeting with a psychiatric doctor to check your mental health. A psychiatric doctor helps you deal with troubling thoughts and feelings by giving you medicine. They'll make sure you know the plan for your care. You may see them for a long time. When you're feeling better, they may refer you back to seeing your family doctor.   If you have any questions, we'll be glad to talk to you.  About visits  Be open  At your visits, please talk openly about your problems. It may feel hard, but it's the best way for us to help you.  Cancelling visits  If you can't come to your visit, please call us right away at [phone]. If you don't cancel at least 24 hours (1 full day) before your visit, that's \"late cancellation.\"  Not showing up for your visits  Being very late is the same as not showing up. You'll be a \"no show\" if:  You're more than 15 minutes late for a 30-minute (half hour) visit.  You're more than 30 minutes late for a 60-minute (full hour) visit.  If you cancel late or don't show up 2 times within 6 months, we may end your care.  Getting help between visits  If you need help between visits, you can call us Monday to Friday from 8 a.m. to 4:30 p.m. at [phone].  Emergency care  Call 911 or go to the nearest emergency department if your life or someone else's life is in danger.  Call 988 anytime to reach the national Suicide and Crisis hotline.  Medicine refills  To refill your medicine, call your pharmacy. You can also call [facility] at [phone], Monday to Friday, 8 a.m. to 4:30 p.m. It can take 1 to 3 business days to get a refill.   Forms, letters, and tests  You may have papers to fill out, like FMLA, short-term disability, and workability. We can help you with these forms at your visits, but you must have an " appointment. You may need more than 1 visit for this, to be in an intensive therapy program, or both.  Before we can give you medicine for ADHD, we may refer you to get tested for it or confirm it another way.  We may not be able to give you an emotional support animal letter.  We don't do mental health checks ordered by the court.   We don't do mental health testing, but we can refer you to get tested.   Thank you for choosing us for your care.  For informational purposes only. Not to replace the advice of your health care provider. Copyright   2022 [facility]. All rights reserved. [place] [id number] - 12/22.     Plan:  1.Patient will take the medications as prescribed.   Medications: Continue Effexor 150 mg daily in addition to EFFEXOR 75 mg making a total daily dose of 225 mg for depression and anxiety  Continue Abilify 2 mg daily for mood, depression and anxiety  Patient will not stop taking medications or adjust them without consulting with the provider.  2.Patient will call with any problems between visits.  3.Patient will go to the emergency room if not feeling safe , unable to function in the community, or if suicidal, homicidal or hearing voices or having paranoia.  4.Patient will abstain from drugs and alcohol./Pt denies use .  5.Patient will not drive if sedated on medications or under influence of any substance.  6.Patient will not mix psychiatric medications with drugs and alcohol.   7.Patient will watch his diet and exercise.  8.Patient will see non psychiatric providers for non psychiatric disorders.  9. Next appointment in 6 weeks

## 2024-03-12 ENCOUNTER — MYC REFILL (OUTPATIENT)
Dept: MIDWIFE SERVICES | Facility: CLINIC | Age: 24
End: 2024-03-12
Payer: COMMERCIAL

## 2024-03-12 DIAGNOSIS — Z30.011 ENCOUNTER FOR INITIAL PRESCRIPTION OF CONTRACEPTIVE PILLS: ICD-10-CM

## 2024-03-12 RX ORDER — LEVONORGESTREL AND ETHINYL ESTRADIOL 0.15-0.03
1 KIT ORAL DAILY
Qty: 91 TABLET | Refills: 3 | OUTPATIENT
Start: 2024-03-12

## 2024-04-14 DIAGNOSIS — F33.41 RECURRENT MAJOR DEPRESSIVE DISORDER, IN PARTIAL REMISSION (H): ICD-10-CM

## 2024-04-14 DIAGNOSIS — F41.9 ANXIETY: ICD-10-CM

## 2024-04-15 ENCOUNTER — MYC REFILL (OUTPATIENT)
Dept: PSYCHIATRY | Facility: CLINIC | Age: 24
End: 2024-04-15
Payer: COMMERCIAL

## 2024-04-15 DIAGNOSIS — F33.41 RECURRENT MAJOR DEPRESSIVE DISORDER, IN PARTIAL REMISSION (H): ICD-10-CM

## 2024-04-15 DIAGNOSIS — F41.9 ANXIETY: ICD-10-CM

## 2024-04-15 RX ORDER — VENLAFAXINE HYDROCHLORIDE 150 MG/1
150 CAPSULE, EXTENDED RELEASE ORAL DAILY
Qty: 90 CAPSULE | Refills: 1 | Status: SHIPPED | OUTPATIENT
Start: 2024-04-15

## 2024-04-15 RX ORDER — VENLAFAXINE HYDROCHLORIDE 75 MG/1
CAPSULE, EXTENDED RELEASE ORAL
Qty: 30 CAPSULE | Refills: 1 | Status: SHIPPED | OUTPATIENT
Start: 2024-04-15 | End: 2024-05-31

## 2024-04-15 RX ORDER — VENLAFAXINE HYDROCHLORIDE 75 MG/1
CAPSULE, EXTENDED RELEASE ORAL
Qty: 30 CAPSULE | Refills: 1 | Status: CANCELLED | OUTPATIENT
Start: 2024-04-15

## 2024-04-15 RX ORDER — VENLAFAXINE HYDROCHLORIDE 150 MG/1
150 CAPSULE, EXTENDED RELEASE ORAL DAILY
Qty: 90 CAPSULE | Refills: 1 | Status: CANCELLED | OUTPATIENT
Start: 2024-04-15

## 2024-04-15 NOTE — TELEPHONE ENCOUNTER
FCC: Please contact patient to make followup appt with Suresh Manjarrez NP    Date of Last Office Visit: 2/19/24  Date of Next Office Visit: none scheduled  No shows since last visit: 0  Cancellations since last visit: 4/2/24    Medication requested: venlafaxine (EFFEXOR XR) 150 MG 24 hr capsule  Date last ordered: 10/3/24 Qty: 90 Refills: 1      Medication requested: venlafaxine (EFFEXOR XR) 75 MG 24 hr capsule  Date last ordered: 2/19/24 Qty: 30 Refills: 1      Review of [place] ?: NA    Lapse in medication adherence greater than 5 days?: no  If yes, call patient and gather details: NA  Medication refill request verified as identical to current order?: yes  Result of Last DAM, VPA, Li+ Level, CBC, or Carbamazepine Level (at or since last visit): N/A    Last visit treatment plan:   Plan:  1.Patient will take the medications as prescribed.   Medications: Continue Effexor 150 mg daily in addition to EFFEXOR 75 mg making a total daily dose of 225 mg for depression and anxiety  Continue Abilify 2 mg daily for mood, depression and anxiety  Patient will not stop taking medications or adjust them without consulting with the provider.  2.Patient will call with any problems between visits.  3.Patient will go to the emergency room if not feeling safe , unable to function in the community, or if suicidal, homicidal or hearing voices or having paranoia.  4.Patient will abstain from drugs and alcohol./Pt denies use .  5.Patient will not drive if sedated on medications or under influence of any substance.  6.Patient will not mix psychiatric medications with drugs and alcohol.   7.Patient will watch his diet and exercise.  8.Patient will see non psychiatric providers for non psychiatric disorders.  9. Next appointment in 6 weeks      []Medication refilled per  Medication Refill in Ambulatory Care  policy.  [x]Medication unable to be refilled by RN due to criteria not met as indicated below:    []Eligibility - not seen in the last  year   [x]Supervision - no future appointment   []Compliance - no shows, cancellations or lapse in therapy   []Verification - order discrepancy   []Controlled medication   []Medication not included in policy   []90-day supply request   []Other

## 2024-04-16 ENCOUNTER — TELEPHONE (OUTPATIENT)
Dept: PSYCHIATRY | Facility: CLINIC | Age: 24
End: 2024-04-16

## 2024-04-16 NOTE — TELEPHONE ENCOUNTER
Reason for call:  Medication   If this is a refill request, has the caller requested the refill from the pharmacy already? Yes  Will the patient be using a [facility]? No  Name of the pharmacy and phone number for the current request: [facility] and [phone]    Name of the medication requested: Effexor    Other request: na    Phone number to reach patient:  Cell number on file:    Telephone Information:   Mobile [phone]       Best Time:  anytime    Can we leave a detailed message on this number?  YES    Travel screening: Not Applicable

## 2024-04-19 ENCOUNTER — VIRTUAL VISIT (OUTPATIENT)
Dept: PSYCHIATRY | Facility: CLINIC | Age: 24
End: 2024-04-19
Payer: COMMERCIAL

## 2024-04-19 DIAGNOSIS — F12.20 CANNABIS DEPENDENCE (H): ICD-10-CM

## 2024-04-19 DIAGNOSIS — F41.9 ANXIETY: ICD-10-CM

## 2024-04-19 DIAGNOSIS — F33.1 MAJOR DEPRESSIVE DISORDER, RECURRENT EPISODE, MODERATE (H): Primary | ICD-10-CM

## 2024-04-19 PROCEDURE — 99214 OFFICE O/P EST MOD 30 MIN: CPT | Mod: 95 | Performed by: NURSE PRACTITIONER

## 2024-04-19 ASSESSMENT — PATIENT HEALTH QUESTIONNAIRE - PHQ9
SUM OF ALL RESPONSES TO PHQ QUESTIONS 1-9: 3
10. IF YOU CHECKED OFF ANY PROBLEMS, HOW DIFFICULT HAVE THESE PROBLEMS MADE IT FOR YOU TO DO YOUR WORK, TAKE CARE OF THINGS AT HOME, OR GET ALONG WITH OTHER PEOPLE: NOT DIFFICULT AT ALL
SUM OF ALL RESPONSES TO PHQ QUESTIONS 1-9: 3

## 2024-04-19 ASSESSMENT — PAIN SCALES - GENERAL: PAINLEVEL: NO PAIN (0)

## 2024-04-19 NOTE — PATIENT INSTRUCTIONS
"Patient Education   [facility]  What to Expect  Here's what to expect in the [facility].   About the program  You'll be meeting with a psychiatric doctor to check your mental health. A psychiatric doctor helps you deal with troubling thoughts and feelings by giving you medicine. They'll make sure you know the plan for your care. You may see them for a long time. When you're feeling better, they may refer you back to seeing your family doctor.   If you have any questions, we'll be glad to talk to you.  About visits  Be open  At your visits, please talk openly about your problems. It may feel hard, but it's the best way for us to help you.  Cancelling visits  If you can't come to your visit, please call us right away at [phone]. If you don't cancel at least 24 hours (1 full day) before your visit, that's \"late cancellation.\"  Not showing up for your visits  Being very late is the same as not showing up. You'll be a \"no show\" if:  You're more than 15 minutes late for a 30-minute (half hour) visit.  You're more than 30 minutes late for a 60-minute (full hour) visit.  If you cancel late or don't show up 2 times within 6 months, we may end your care.  Getting help between visits  If you need help between visits, you can call us Monday to Friday from 8 a.m. to 4:30 p.m. at [phone].  Emergency care  Call 911 or go to the nearest emergency department if your life or someone else's life is in danger.  Call 988 anytime to reach the national Suicide and Crisis hotline.  Medicine refills  To refill your medicine, call your pharmacy. You can also call [facility]'s [facility] at [phone], Monday to Friday, 8 a.m. to 4:30 p.m. It can take 1 to 3 business days to get a refill.   Forms, letters, and tests  You may have papers to fill out, like FMLA, short-term disability, and workability. We can help you with these forms at your visits, but you must have an " appointment. You may need more than 1 visit for this, to be in an intensive therapy program, or both.  Before we can give you medicine for ADHD, we may refer you to get tested for it or confirm it another way.  We may not be able to give you an emotional support animal letter.  We don't do mental health checks ordered by the court.   We don't do mental health testing, but we can refer you to get tested.   Thank you for choosing us for your care.  For informational purposes only. Not to replace the advice of your health care provider. Copyright   2022 [facility]. All rights reserved. [employer] [id number] - 12/22.     Plan:  1.Patient will take the medications as prescribed.   Medications: Continue Effexor 150 mg daily in addition to EFFEXOR 75 mg making a total daily dose of 225 mg for depression and anxiety  Continue Abilify 2 mg daily for mood, depression and anxiety  Patient will not stop taking medications or adjust them without consulting with the provider.  2.Patient will call with any problems between visits.  3.Patient will go to the emergency room if not feeling safe , unable to function in the community, or if suicidal, homicidal or hearing voices or having paranoia.  4.Patient will abstain from drugs and alcohol./Pt denies use .  5.Patient will not drive if sedated on medications or under influence of any substance.  6.Patient will not mix psychiatric medications with drugs and alcohol.   7.Patient will watch his diet and exercise.  8.Patient will see non psychiatric providers for non psychiatric disorders.  9. Next appointment in 6 weeks

## 2024-04-19 NOTE — NURSING NOTE
Is the patient currently in the state of [place]? YES    Visit mode:VIDEO    If the visit is dropped, the patient can be reconnected by: VIDEO VISIT: Text to cell phone:   Telephone Information:   Mobile [phone]       Will anyone else be joining the visit? No  (If patient encounters technical issues they should call [phone])    How would you like to obtain your AVS? MyChart    Are changes needed to the allergy or medication list? No    Are refills needed on medications prescribed by this physician? NO    Rooming Documentation: Assigned questionnaire(s) completed .    Reason for visit: RECHUVALDO Hayden

## 2024-04-19 NOTE — PROGRESS NOTES
"PSYCHIATRIC PROGRESS NOTE     Name:  Ashleigh Bustillo  : 2000    Ashleihg Bustillo is a 23 year old female who is being evaluated via a billable Video visit.      Telemedicine Visit: The patient's condition can be safely assessed and treated via synchronous audio and visual telemedicine encounter.      Reason for Telemedicine Visit: COVID 19 pandemic and the social and physical recommendations by the CDC and MD., Patient has requested telehealth visit, and Patient unable to travel      Originating Site (Patient Location): Patient's home    Distant Site (Provider Location): [facility] Outpatient Setting: [facility]    Consent:  The patient/guardian has verbally consented to: the potential risks and benefits of telemedicine (video visit or phone) versus in person care; bill my insurance or make self-payment for services provided; and responsibility for payment of non-covered services.     Mode of Communication:  MOVE Guides platform     As the provider I attest to compliance with applicable laws and regulations related to telemedicine.    Date of Last Visit: 24                                          CHIEF COMPLAINT   \"I am doing better\"    HISTORY OF PRESENT ILLNESS     Patient who was last seen in the clinic on 24 returned today for follow up visit.  Patient reports he is doing great, stating mood, anxiety, and depression are well under control with current medication regimen.  Patient stated she has started working full-time at [employer].  Patient reports that she is adjusting well as her new job.  Patient does mention she has noticed increased appetite since start working.  Patient does mention that she is still engages in cannabis use but with decreased frequency since working full-time.  Patient's reported she still drinks alcohol occasionally, particularly whenever she hangs out with her friends.  Patient currently denies both suicidal and homicidal ideation.  Patient also denies SIB.  " Patient reported no marbella or hypomania.  Patient return to clinic in 6 weeks for follow-up.  PSYCHIATRIC HISTORY:   History of Psychiatric Hospitalizations:   - Inpatient: Once when she was 14 years old  - IOP/PHP/Day treatment: None  History of Suicidal Ideation: Positive   History of Suicide Attempts:Positive   History of Self-injurious Behavior: Denies a history of SIB.  Current:  No  History of Violence/Aggression: Negative  History of Commitment? Negative  Electroconvulsive Therapy (ECT):Negative    PSYCHIATRIC REVIEW OF SYSTEMS:   Psychiatric Review of Systems:   Depression:   Reports: depressed mood, suicidal ideation, decreased interest, changes in sleep, changes in appetite, guilt, hopelessness, helplessness, impaired concentration, decreased energy, irritability.  Marbella:   Denies: sleeplessness, increased goal-directed activities, abrupt increase in energy pressured speech  Psychosis:   Denies: visual hallucinations, auditory hallucinations, paranoia  Anxiety:   Reports: excessive worries that are difficult to control, panic attacks  PTSD:   Reports: re-experiencing past trauma, nightmares, increased arousal, avoidance of traumatic stimuli, impaired function.  Denies: re-experiencing past trauma, nightmares, increased arousal, avoidance of traumatic stimuli, impaired function.  OCD:   Denies: obsessions, checking, symmetry, cleaning, skin picking.  Eating Disorder:   Denies: restriction, binging, purging.    Sleep:       MEDICATIONS                                                                                                Current Outpatient Medications   Medication Sig Dispense Refill    ARIPiprazole (ABILIFY) 2 MG tablet Take 1 tablet (2 mg) by mouth daily 30 tablet 2    levonorgestrel-ethinyl estradiol (SEASONALE) 0.15-0.03 MG tablet Take 1 tablet by mouth daily 91 tablet 3    venlafaxine (EFFEXOR XR) 150 MG 24 hr capsule TAKE 1 CAPSULE(150 MG) BY MOUTH DAILY 90 capsule 1    venlafaxine (EFFEXOR XR)  "75 MG 24 hr capsule TAKE 1 CAPSULE(75 MG) BY MOUTH DAILY IN ADDITION TO EFFEXOR  MG MAKING A. TOTAL DAILY DOSE  MG 30 capsule 1    VITAMIN D PO        No current facility-administered medications for this visit.       DRUG MONITORING:  [place] Prescription Monitoring Program evaluating controlled substances in the last year in [place]:  The [place] Prescription Monitoring Program has been reviewed and there are no current concerns with: diversionary activity, early refill requests, and or obtaining the medication from multiple providers       PAST PSYCHOTROPIC MEDICATIONS:  Prozac, Effexor    VITALS   There were no vitals taken for this visit.     BP Readings from Last 1 Encounters:   06/15/23 100/60     Pulse Readings from Last 1 Encounters:   06/15/23 87     Wt Readings from Last 1 Encounters:   06/15/23 50.9 kg (112 lb 4.8 oz)     Ht Readings from Last 1 Encounters:   06/15/23 1.575 m (5' 2\")     Estimated body mass index is 20.54 kg/m  as calculated from the following:    Height as of 6/15/23: 1.575 m (5' 2\").    Weight as of 6/15/23: 50.9 kg (112 lb 4.8 oz).      PERTINENT HISTORY   PAST MEDICAL HISTORY:   Past Medical History:   Diagnosis Date    Anorexia 03/2022    Anxiety     Mild episode of recurrent major depressive disorder (H24)     Suicide attempt (H)     at least once, hospitalized       PAST SURGICAL HISTORY: No past surgical history on file.    FAMILY HISTORY:   Family History   Problem Relation Age of Onset    Scoliosis Sister     Diabetes Maternal Grandmother        SOCIAL HISTORY:   Social History     Tobacco Use    Smoking status: Never     Passive exposure: Current    Smokeless tobacco: Never    Tobacco comments:     Once in awhile use   Substance Use Topics    Alcohol use: Yes     Comment: once a month drinks 1-2         Seizures or Head Injury: Denies history of head injury. Denies history of seizures.  History of cardiac disease, rheumatic fever, fainting or dizziness, especially " "with exercise, seizures, chest pain or shortness of breath with exercise, unexplained change in exercise tolerance, palpitations, high blood pressure, or heart murmur?   No    LABS & IMAGING                                                                                                                Personally reviewed  Recent Labs   Lab Test 04/08/22  1219   WBC 6.4   HGB 13.1   HCT 38.5   MCV 88        Recent Labs   Lab Test 04/08/22  1219      POTASSIUM 3.9   CHLORIDE 107   CO2 21*   GLC 77   SUE 9.0   BUN 8   CR 0.64   GFRESTIMATED >90   ALBUMIN 3.9   PROTTOTAL 7.5   AST 14   ALT 10   ALKPHOS 59   BILITOTAL 0.6     Recent Labs   Lab Test 04/08/22  1219   CHOL 124   LDL 46   HDL 63   TRIG 73     Recent Labs   Lab Test 04/08/22  1219   TSH 2.21     No results found for: \"MOH506\", \"RNSB019\", \"[id number]\", \"VITD3\", \"D2VIT\", \"D3VIT\", \"DTOT\", \"[id number]\", \"[id number]\", \"[id number]\", \"[id number]\", \"[id number]\", \"[id number]\"     ALLERGY & IMMUNIZATIONS     No Known Allergies    FAMILY MEDICAL HISTORY:     Family History       Problem (# of Occurrences) Relation (Name,Age of Onset)    Diabetes (1) Maternal Grandmother    Scoliosis (1) Sister              Family history of sudden or unexplained death or an event requiring resuscitation in children or young adults, cardiac arrhythmias (eg, Amlika-Parkinson-White syndrome), long QT syndrome, catecholaminergic paroxysmal ventricular tachycardia, Brugada syndrome, arrhythmogenic right ventricular dysplasia, hypertrophic cardiomyopathy, dilated cardiomyopathy, or Marfan syndrome?  No    FAMILY PSYCHIATRIC HISTORY:   Psychiatry:Denies  Substance use history in family: Brother and day struggled with drug abuse  Family suicide history: Denies      SIGNIFICANT SOCIAL/FAMILY HISTORY:                                           Born and raised in: [place]  Relationship status: Single, has a boyfriend  Children: None    Highest education level was:currently in " "college   Service:Denies   Employment status: Part time working at the 's [employer] center  LEGAL:Denies     SUBSTANCE USE HISTORY    Tobacco use: Vapes occasionally  Caffeine: Denies  Current alcohol: 2-3 drinks once weekly  Current substance use:Marijuana  Past use alcohol/substance use:Wendy      MEDICAL REVIEW OF SYSTEMS:   Ten system review was completed with pertinent positives noted above    MENTAL STATUS EXAM:   Mental Status Examination (limited due to video virtual visit format):  Vital Signs: There were no vitals taken for this virtual visit.  Appearance: adequately groomed, appears stated age, and in no apparent distress.  Attitude: cooperative   Eye Contact: good to the extent that can be determined in a video visit  Muscle Strength and Tone: no gross abnormalities based on remote observation  Psychomotor Behavior:  no evidence of tardive dyskinesia, dystonia, or tics based on remote observation  Gait and Station: normal, no gross abnormalities based on remote observation  Speech: clear, coherent, normal prosody, regular rate, regular rhythm and fluent  Associations: No loosening of associations  Thought Process: coherent and goal directed  Thought Content: no evidence of suicidal ideation or homicidal ideation, no evidence of psychotic thought, no auditory hallucinations present and no visual hallucinations present  Mood: \"good\"  Affect: appropriate and in normal range  Insight: good  Judgment: intact, adequate for safety  Impulse Control: intact  Oriented to: time, place, person and situation  Attention Span and Concentration: normal  Language: Intact  Recent and Remote Memory: intact to interview. Not formally assessed. No amnesia.  Fund of Knowledge: appropriate        SAFETY   Feels safe in home: Yes   Suicidal ideation: Denies  History of suicide attempts:  No   Hx of impulsivity: No     DSM 5 DIAGNOSIS:   1. Recurrent major depressive disorder, in partial remission (H)    2. " Anxiety    ASSESSMENT AND PLAN    Patient is a 23 year old,   Not  or  female  with a history of generalized anxiety disorder, major depressive disorder, and eating disorder female  who presents for follow up visit.  She has started working full-time at [employer] and seem to enjoy her new job.  Patient continues to engaging in cannabis and alcohol use daily but with less frequency since started working.  Today, she reports symptoms stabilization on current medication regimen.   She denies SI, SIB, and HI.  She denies self-injurious behaviors and HI.  She also denied both auditory and visual hallucination.  Patient reported no sherley or hypomania.  Sleep and appetite are at baseline, improved and stabilized. RTC in 6 weeks    Plan:  1.Patient will take the medications as prescribed.   Medications: Continue Effexor 150 mg daily in addition to EFFEXOR 75 mg making a total daily dose of 225 mg for depression and anxiety  Continue Abilify 2 mg daily for mood, depression and anxiety  Patient will not stop taking medications or adjust them without consulting with the provider.  2.Patient will call with any problems between visits.  3.Patient will go to the emergency room if not feeling safe , unable to function in the community, or if suicidal, homicidal or hearing voices or having paranoia.  4.Patient will abstain from drugs and alcohol./Pt denies use .  5.Patient will not drive if sedated on medications or under influence of any substance.  6.Patient will not mix psychiatric medications with drugs and alcohol.   7.Patient will watch his diet and exercise.  8.Patient will see non psychiatric providers for non psychiatric disorders.  9. Next appointment in 6 weeks    Risk Assessment:     Ashleigh has notable risk factors for self-harm, including, single status, anxiety and passive SI. However, risk is mitigated by ability to volunteer a safety plan and history of seeking help when needed. Additional steps  taken to minimize risk include making medication adjustment, asking patient to call 911 and go to the ER if not able to stay safe at home,  Therefore, based on all available evidence including the factors cited above, Ashleigh does not appear to be an imminent danger to self or others and does not meet criteria 72 hour hold. However, if patient uses substances or is non-adherent with medication, their risk of decompensation and SI/HI will be elevated. This was discussed with the patient as she verbalized good understanding.   CONSULTS/REFERRALS:   Continue therapy  None at this time  Coordinate care with therapist as needed    MEDICAL:   None at this time  Coordinate care with PCP (Lauren Sen) as needed  Follow up with primary care provider as planned or for acute medical concerns.    PSYCHOEDUCATION:  Medication side effects and alternatives reviewed. Health promotion activities recommended and reviewed today. All questions addressed. Education and counseling completed regarding risks and benefits of medications and psychotherapy options.  Consent provided by patient/guardian  Call the psychiatric nurse line with medication questions or concerns at [phone].  TapInfluencehart may be used to communicate with your provider, but this is not intended to be used for emergencies.  BLACK BOX WARNING: Discussed the Food and Drug Administration (FDA) requires that all antidepressants carry a warning that some children, adolescents and young adults may be at increased risk of suicide when taking antidepressants. Anyone taking an antidepressant should be watched closely for worsening depression or unusual behavior especially in the first few weeks after starting an SSRI. Keep in mind, antidepressants are more likely to reduce suicide risk in the long run by improving mood.   SEROTONIN SYNDROME:  Discussed risks of Serotonin syndrome (ie, serotonin toxicity) which is a potentially life-threatening condition associated with  increased serotonergic activity in the central nervous system (CNS). It is seen with therapeutic medication use, inadvertent interactions between drugs, and intentional self-poisoning. Serotonin syndrome may involve a spectrum of clinical findings, which often include mental status changes, autonomic hyperactivity, and neuromuscular abnormalities.    BENZODIAZEPINE:  discussion on how benzos work and the need to use them short term due to potential of anxiety getting.  This is a controlled substance with risk for abuse, need to keep in a safe keep place and cannot replace lost scripts.    HYPNOTIC USE: Hypnotic use, risk for CNS depression, sleep-walking, not to mix with ETOH or other CNS depressant, need for six hours of sleep, stop if change in mood.  This is a controlled substance with risk for abuse, need to keep in a safe keep place and cannot replace lost scripts.  FIRST GENERATION ANTIPSYCHOTIC/ SECOND GENERATION ANTIPSYCHOTIC USE:  Atypical need for cardiometabolic monitoring with medication- B/P, weight, blood sugar, cholesterol.  Need to monitor for abnormal movements taught  SAFETY:  We all care about your loved one's safety. To reduce the risk of self-harm, remove access to all:  Firearms, Medicines (both prescribed and over-the-counter), Knives and other sharp objects, Ropes and like materials, and Alcohol  SLEEP HYGIENE: establish a sleep routine, limit screen time 1 hour prior to bed, use bed for sleep only, take sleep/medications on time (including sleepy time tea, trazadone or herbal treatments such as melatonin), aroma therapy, limit caffeine/sugar, yoga, guided imagery, stretch, meditation, limit naps to 20 minutes, make a temperature change in the room, white noise, be mindful of slowing down breathing, take a warm bath/shower, frequently wash sheets, and journaling.   Medlineplus.gov is information for patients.  It is run by the [employer] and it contains information about all  disorders, diseases and all medications.      COMMUNITY RESOURCES:    CRISIS NUMBERS: Provided in AVS 2023  National Suicide Prevention Lifeline: 7-700-884-TALK (072-580-5477)  Talk Local/resources for a list of additional resources (SOS)            [facility] - [phone]   Urgent Care Adult [facility]-[phone] mobile unit/  crisis line  [facility] -[phone]   COPE  [facility] Mobile [facility] -[phone] (adults)/ [phone] (child)  Poison Control Center - 1-141.828.9685    OR  go to nearest ER  Crisis Text Line for any crisis  send this-   To: 187891   [facility]  [phone]  National Suicide Prevention Lifeline: 738.516.7355 (TTY: 237.807.7778). Call anytime for help.  (www.suicidepreventionlifeline.org)  National [place] on Mental Illness (www.[place].org): 693-259-5574 or 152-064-2328.   Mental Health Association (www.mentalhealth.org): 655.499.3100 or 139-457-5321.  [place] Crisis Text Line: Text [place] to 016848  Suicide LifeLine Chat: suicidePurdue Research Foundation.org/chat    ADMINISTRATIVE BILLIN min spent interviewing patient, reviewing referral documents, obtaining and reviewing outside records, communication with other health specialists, and preparing this report on this day: 24    Video/Phone Start Time:  9:34 am  Video/Phone End Time:   9:45 am    Greater than 50% of time was spent in counseling and coordination of care regarding above diagnoses and treatment plan.    Patient Status:  Our psychiatry providers act as a specialty service for Primary Care Providers in the [facility] that seek to optimize medications for unstable patients.  Once medications have been optimized, our providers discharge the patient back to the referring Primary Care Provider for ongoing medication management.  This type of system allows our providers to serve a high volume of patients. At this time  Patient will  continue to be seen for ongoing consultation and stabilization.    Signed:   Jovan Manjarrez, MSN, APRN, PMHNP-BC  Long Term Outpatient Psychiatry  Chart documentation done in part with Dragon Voice Recognition software.  Although reviewed after completion, some word and grammatical errors may remain. Answers submitted by the patient for this visit:  Patient Health Questionnaire (Submitted on 7/24/2023)  If you checked off any problems, how difficult have these problems made it for you to do your work, take care of things at home, or get along with other people?: Very difficult  PHQ9 TOTAL SCORE: 14Answers submitted by the patient for this visit:  Patient Health Questionnaire (Submitted on 10/3/2023)  If you checked off any problems, how difficult have these problems made it for you to do your work, take care of things at home, or get along with other people?: Not difficult at all  PHQ9 TOTAL SCORE: 3    Answers submitted by the patient for this visit:  Patient Health Questionnaire (Submitted on 11/13/2023)  If you checked off any problems, how difficult have these problems made it for you to do your work, take care of things at home, or get along with other people?: Somewhat difficult  PHQ9 TOTAL SCORE: 12    Answers submitted by the patient for this visit:  Patient Health Questionnaire (Submitted on 1/21/2024)  If you checked off any problems, how difficult have these problems made it for you to do your work, take care of things at home, or get along with other people?: Very difficult  PHQ9 TOTAL SCORE: 13  ASIA-7 (Submitted on 1/21/2024)  ASIA 7 TOTAL SCORE: 9    Answers submitted by the patient for this visit:  Patient Health Questionnaire (Submitted on 4/19/2024)  If you checked off any problems, how difficult have these problems made it for you to do your work, take care of things at home, or get along with other people?: Not difficult at all  PHQ9 TOTAL SCORE: 3

## 2024-05-10 ENCOUNTER — MYC REFILL (OUTPATIENT)
Dept: PSYCHIATRY | Facility: CLINIC | Age: 24
End: 2024-05-10
Payer: COMMERCIAL

## 2024-05-10 DIAGNOSIS — F33.1 MAJOR DEPRESSIVE DISORDER, RECURRENT EPISODE, MODERATE (H): ICD-10-CM

## 2024-05-10 DIAGNOSIS — F41.1 GAD (GENERALIZED ANXIETY DISORDER): ICD-10-CM

## 2024-05-10 RX ORDER — ARIPIPRAZOLE 2 MG/1
2 TABLET ORAL DAILY
Qty: 30 TABLET | Refills: 1 | Status: SHIPPED | OUTPATIENT
Start: 2024-05-10 | End: 2024-07-12 | Stop reason: DRUGHIGH

## 2024-05-10 NOTE — TELEPHONE ENCOUNTER
Date of Last Office Visit: 4/19/24  Date of Next Office Visit: 5/31/24  No shows since last visit: 0  Cancellations since last visit: 0    Medication requested: ARIPiprazole (ABILIFY) 2 MG tablet  Date last ordered: 2/19/24 Qty: 30 Refills: 2     Review of [place] ?: NA    Lapse in medication adherence greater than 5 days?: No  If yes, call patient and gather details: na  Medication refill request verified as identical to current order?: yes  Result of Last DAM, VPA, Li+ Level, CBC, or Carbamazepine Level (at or since last visit): N/A    Last visit treatment plan: Medications: Continue Effexor 150 mg daily in addition to EFFEXOR 75 mg making a total daily dose of 225 mg for depression and anxiety  Continue Abilify 2 mg daily for mood, depression and anxiety  Next appointment in 6 weeks     [x]Medication refilled per  Medication Refill in Ambulatory Care  policy.  []Medication unable to be refilled by RN due to criteria not met as indicated below:    []Eligibility - not seen in the last year   []Supervision - no future appointment   []Compliance - no shows, cancellations or lapse in therapy   []Verification - order discrepancy   []Controlled medication   []Medication not included in policy   []90-day supply request   []Other

## 2024-05-23 ENCOUNTER — PATIENT OUTREACH (OUTPATIENT)
Dept: CARE COORDINATION | Facility: CLINIC | Age: 24
End: 2024-05-23
Payer: COMMERCIAL

## 2024-05-30 ENCOUNTER — MYC REFILL (OUTPATIENT)
Dept: PSYCHIATRY | Facility: CLINIC | Age: 24
End: 2024-05-30
Payer: COMMERCIAL

## 2024-05-30 DIAGNOSIS — F33.41 RECURRENT MAJOR DEPRESSIVE DISORDER, IN PARTIAL REMISSION (H): ICD-10-CM

## 2024-05-30 DIAGNOSIS — F41.9 ANXIETY: ICD-10-CM

## 2024-05-30 RX ORDER — VENLAFAXINE HYDROCHLORIDE 75 MG/1
CAPSULE, EXTENDED RELEASE ORAL
Qty: 30 CAPSULE | Refills: 1 | OUTPATIENT
Start: 2024-05-30

## 2024-05-30 NOTE — TELEPHONE ENCOUNTER
1) Reviewed refill request(s) from [facility]     2) Any Controlled Substance(s)? No    3) Refill(s) requested for:     - venlafaxine (EFFEXOR XR) 75 MG 24 hr capsule  Date last ordered: 4/15/24 Qty: 30 Refills: 1   Refill has been requested too soon    4) Action taken: Patient contacted via [facility]      BREANNA DIA RN on 5/30/2024 at 10:45 AM       ambulatory

## 2024-05-31 ENCOUNTER — VIRTUAL VISIT (OUTPATIENT)
Dept: PSYCHIATRY | Facility: CLINIC | Age: 24
End: 2024-05-31
Payer: COMMERCIAL

## 2024-05-31 DIAGNOSIS — F33.1 MAJOR DEPRESSIVE DISORDER, RECURRENT EPISODE, MODERATE (H): ICD-10-CM

## 2024-05-31 DIAGNOSIS — F33.41 RECURRENT MAJOR DEPRESSIVE DISORDER, IN PARTIAL REMISSION (H): Primary | ICD-10-CM

## 2024-05-31 DIAGNOSIS — F41.9 ANXIETY: ICD-10-CM

## 2024-05-31 DIAGNOSIS — F41.1 GAD (GENERALIZED ANXIETY DISORDER): ICD-10-CM

## 2024-05-31 DIAGNOSIS — F12.20 CANNABIS DEPENDENCE (H): ICD-10-CM

## 2024-05-31 PROCEDURE — 99214 OFFICE O/P EST MOD 30 MIN: CPT | Mod: 95 | Performed by: NURSE PRACTITIONER

## 2024-05-31 RX ORDER — ARIPIPRAZOLE 2 MG/1
2 TABLET ORAL DAILY
Qty: 30 TABLET | Refills: 1 | Status: CANCELLED | OUTPATIENT
Start: 2024-05-31

## 2024-05-31 RX ORDER — VENLAFAXINE HYDROCHLORIDE 75 MG/1
CAPSULE, EXTENDED RELEASE ORAL
Qty: 30 CAPSULE | Refills: 1 | Status: SHIPPED | OUTPATIENT
Start: 2024-05-31 | End: 2024-08-08

## 2024-05-31 ASSESSMENT — PAIN SCALES - GENERAL: PAINLEVEL: NO PAIN (0)

## 2024-05-31 NOTE — NURSING NOTE
Is the patient currently in the state of [place]? YES    Visit mode:VIDEO    If the visit is dropped, the patient can be reconnected by: VIDEO VISIT: Text to cell phone:   Telephone Information:   Mobile [phone]       Will anyone else be joining the visit? NO  (If patient encounters technical issues they should call [phone])    How would you like to obtain your AVS? MyChart    Are changes needed to the allergy or medication list? No    Are refills needed on medications prescribed by this physician? YES    venlafaxine (EFFEXOR XR) 75 MG 24 hr capsule   ARIPiprazole (ABILIFY) 2 MG tablet    Reason for visit: RECHECK    Patricia BAILON      
Show Aperture Variable?: Yes
Detail Level: Detailed
Render Post-Care Instructions In Note?: no
Consent: The patient's consent was obtained including but not limited to risks of crusting, scabbing, blistering, scarring, darker or lighter pigmentary change, recurrence, incomplete removal and infection.
Post-Care Instructions: I reviewed with the patient in detail post-care instructions. Patient is to wear sunprotection, and avoid picking at any of the treated lesions. Pt may apply Vaseline to crusted or scabbing areas.
Number Of Freeze-Thaw Cycles: 1 freeze-thaw cycle
Duration Of Freeze Thaw-Cycle (Seconds): 0

## 2024-05-31 NOTE — PATIENT INSTRUCTIONS
"Patient Education   [facility]  What to Expect  Here's what to expect in the [facility].   About the program  You'll be meeting with a psychiatric doctor to check your mental health. A psychiatric doctor helps you deal with troubling thoughts and feelings by giving you medicine. They'll make sure you know the plan for your care. You may see them for a long time. When you're feeling better, they may refer you back to seeing your family doctor.   If you have any questions, we'll be glad to talk to you.  About visits  Be open  At your visits, please talk openly about your problems. It may feel hard, but it's the best way for us to help you.  Cancelling visits  If you can't come to your visit, please call us right away at [phone]. If you don't cancel at least 24 hours (1 full day) before your visit, that's \"late cancellation.\"  Not showing up for your visits  Being very late is the same as not showing up. You'll be a \"no show\" if:  You're more than 15 minutes late for a 30-minute (half hour) visit.  You're more than 30 minutes late for a 60-minute (full hour) visit.  If you cancel late or don't show up 2 times within 6 months, we may end your care.  Getting help between visits  If you need help between visits, you can call us Monday to Friday from 8 a.m. to 4:30 p.m. at [phone].  Emergency care  Call 911 or go to the nearest emergency department if your life or someone else's life is in danger.  Call 988 anytime to reach the national Suicide and Crisis hotline.  Medicine refills  To refill your medicine, call your pharmacy. You can also call [facility] at [phone], Monday to Friday, 8 a.m. to 4:30 p.m. It can take 1 to 3 business days to get a refill.   Forms, letters, and tests  You may have papers to fill out, like FMLA, short-term disability, and workability. We can help you with these forms at your visits, but you must have an " appointment. You may need more than 1 visit for this, to be in an intensive therapy program, or both.  Before we can give you medicine for ADHD, we may refer you to get tested for it or confirm it another way.  We may not be able to give you an emotional support animal letter.  We don't do mental health checks ordered by the court.   We don't do mental health testing, but we can refer you to get tested.   Thank you for choosing us for your care.  For informational purposes only. Not to replace the advice of your health care provider. Copyright   2022 [facility]. All rights reserved. [place] [id number] - 12/22.     Plan:  1.Patient will take the medications as prescribed.   Medications: Continue Effexor 150 mg daily in addition to EFFEXOR 75 mg making a total daily dose of 225 mg for depression and anxiety  Continue Abilify 2 mg daily for mood, depression and anxiety  Referral sent to the Addiction medicine for Cannabis addiction- someone will get in touch with you soon  Patient will not stop taking medications or adjust them without consulting with the provider.  2.Patient will call with any problems between visits.  3.Patient will go to the emergency room if not feeling safe , unable to function in the community, or if suicidal, homicidal or hearing voices or having paranoia.  4.Patient will abstain from drugs and alcohol./Pt denies use .  5.Patient will not drive if sedated on medications or under influence of any substance.  6.Patient will not mix psychiatric medications with drugs and alcohol.   7.Patient will watch his diet and exercise.  8.Patient will see non psychiatric providers for non psychiatric disorders.  9. Next appointment in 6 weeks

## 2024-05-31 NOTE — PROGRESS NOTES
"PSYCHIATRIC PROGRESS NOTE     Name:  Ashleigh Bustillo  : 2000    Ashleigh Bustillo is a 23 year old female who is being evaluated via a billable Video visit.      Telemedicine Visit: The patient's condition can be safely assessed and treated via synchronous audio and visual telemedicine encounter.      Reason for Telemedicine Visit: COVID 19 pandemic and the social and physical recommendations by the CDC and MD., Patient has requested telehealth visit, and Patient unable to travel      Originating Site (Patient Location): Patient's home    Distant Site (Provider Location): [facility] Outpatient Setting: [facility]    Consent:  The patient/guardian has verbally consented to: the potential risks and benefits of telemedicine (video visit or phone) versus in person care; bill my insurance or make self-payment for services provided; and responsibility for payment of non-covered services.     Mode of Communication:  1-4 All platform     As the provider I attest to compliance with applicable laws and regulations related to telemedicine.    Date of Last Visit: 24                                          CHIEF COMPLAINT   \"I am doing better\"    HISTORY OF PRESENT ILLNESS     Patient who was last seen in the clinic on 24 returned today for follow up visit.  Patient reports she is doing great, stating mood, anxiety, and depression are well under control with current medication regimen.  Patient  also reports her new job at [employer] he is going to mostly well.  Patient does mention she has been engaging in increased cannabis usage lately.  Patient stated she uses cannabis with coworkers at work to get high daily.  Patient states that she has not noticed any issue with job performance although she is aware the increase daily use.  Patient stated she has been trying to stop using cannabis many times but has not been successful.  I suggested sending referral to  addiction medicine to help with cannabis " dependence.  Patient was agreeable to having referral for cannabis dependence.   Patient currently denies both suicidal and homicidal ideation.  Patient also denies SIB.  Patient reported no marbella or hypomania.  Patient return to clinic in 6 weeks for follow-up.  PSYCHIATRIC HISTORY:   History of Psychiatric Hospitalizations:   - Inpatient: Once when she was 14 years old  - IOP/PHP/Day treatment: None  History of Suicidal Ideation: Positive   History of Suicide Attempts:Positive   History of Self-injurious Behavior: Denies a history of SIB.  Current:  No  History of Violence/Aggression: Negative  History of Commitment? Negative  Electroconvulsive Therapy (ECT):Negative    PSYCHIATRIC REVIEW OF SYSTEMS:   Psychiatric Review of Systems:   Depression:   Reports: depressed mood, suicidal ideation, decreased interest, changes in sleep, changes in appetite, guilt, hopelessness, helplessness, impaired concentration, decreased energy, irritability.  Marbella:   Denies: sleeplessness, increased goal-directed activities, abrupt increase in energy pressured speech  Psychosis:   Denies: visual hallucinations, auditory hallucinations, paranoia  Anxiety:   Reports: excessive worries that are difficult to control, panic attacks  PTSD:   Reports: re-experiencing past trauma, nightmares, increased arousal, avoidance of traumatic stimuli, impaired function.  Denies: re-experiencing past trauma, nightmares, increased arousal, avoidance of traumatic stimuli, impaired function.  OCD:   Denies: obsessions, checking, symmetry, cleaning, skin picking.  Eating Disorder:   Denies: restriction, binging, purging.    Sleep:       MEDICATIONS                                                                                                Current Outpatient Medications   Medication Sig Dispense Refill    ARIPiprazole (ABILIFY) 2 MG tablet Take 1 tablet (2 mg) by mouth daily 30 tablet 1    levonorgestrel-ethinyl estradiol (SEASONALE) 0.15-0.03 MG  "tablet Take 1 tablet by mouth daily 91 tablet 3    venlafaxine (EFFEXOR XR) 150 MG 24 hr capsule TAKE 1 CAPSULE(150 MG) BY MOUTH DAILY 90 capsule 1    venlafaxine (EFFEXOR XR) 75 MG 24 hr capsule TAKE 1 CAPSULE(75 MG) BY MOUTH DAILY IN ADDITION TO EFFEXOR  MG MAKING A. TOTAL DAILY DOSE  MG 30 capsule 1    VITAMIN D PO        No current facility-administered medications for this visit.       DRUG MONITORING:  [place] Prescription Monitoring Program evaluating controlled substances in the last year in [place]:  The [place] Prescription Monitoring Program has been reviewed and there are no current concerns with: diversionary activity, early refill requests, and or obtaining the medication from multiple providers       PAST PSYCHOTROPIC MEDICATIONS:  Prozac, Effexor    VITALS   There were no vitals taken for this visit.     BP Readings from Last 1 Encounters:   06/15/23 100/60     Pulse Readings from Last 1 Encounters:   06/15/23 87     Wt Readings from Last 1 Encounters:   06/15/23 50.9 kg (112 lb 4.8 oz)     Ht Readings from Last 1 Encounters:   06/15/23 1.575 m (5' 2\")     Estimated body mass index is 20.54 kg/m  as calculated from the following:    Height as of 6/15/23: 1.575 m (5' 2\").    Weight as of 6/15/23: 50.9 kg (112 lb 4.8 oz).      PERTINENT HISTORY   PAST MEDICAL HISTORY:   Past Medical History:   Diagnosis Date    Anorexia 03/2022    Anxiety     Mild episode of recurrent major depressive disorder (H24)     Suicide attempt (H)     at least once, hospitalized       PAST SURGICAL HISTORY: No past surgical history on file.    FAMILY HISTORY:   Family History   Problem Relation Age of Onset    Scoliosis Sister     Diabetes Maternal Grandmother        SOCIAL HISTORY:   Social History     Tobacco Use    Smoking status: Never     Passive exposure: Current    Smokeless tobacco: Never    Tobacco comments:     Once in awhile use   Substance Use Topics    Alcohol use: Yes     Comment: once a month drinks " "1-2         Seizures or Head Injury: Denies history of head injury. Denies history of seizures.  History of cardiac disease, rheumatic fever, fainting or dizziness, especially with exercise, seizures, chest pain or shortness of breath with exercise, unexplained change in exercise tolerance, palpitations, high blood pressure, or heart murmur?   No    LABS & IMAGING                                                                                                                Personally reviewed  Recent Labs   Lab Test 04/08/22  1219   WBC 6.4   HGB 13.1   HCT 38.5   MCV 88        Recent Labs   Lab Test 04/08/22  1219      POTASSIUM 3.9   CHLORIDE 107   CO2 21*   GLC 77   SUE 9.0   BUN 8   CR 0.64   GFRESTIMATED >90   ALBUMIN 3.9   PROTTOTAL 7.5   AST 14   ALT 10   ALKPHOS 59   BILITOTAL 0.6     Recent Labs   Lab Test 04/08/22  1219   CHOL 124   LDL 46   HDL 63   TRIG 73     Recent Labs   Lab Test 04/08/22  1219   TSH 2.21     No results found for: \"TOU669\", \"HYCV668\", \"NOGJ03HRKYV\", \"VITD3\", \"D2VIT\", \"D3VIT\", \"DTOT\", \"GA03034600\", \"[id number]\", \"[id number]\", \"[id number]\", \"[id number]\", \"[id number]\"     ALLERGY & IMMUNIZATIONS     No Known Allergies    FAMILY MEDICAL HISTORY:     Family History       Problem (# of Occurrences) Relation (Name,Age of Onset)    Diabetes (1) Maternal Grandmother    Scoliosis (1) Sister              Family history of sudden or unexplained death or an event requiring resuscitation in children or young adults, cardiac arrhythmias (eg, Malika-Parkinson-White syndrome), long QT syndrome, catecholaminergic paroxysmal ventricular tachycardia, Brugada syndrome, arrhythmogenic right ventricular dysplasia, hypertrophic cardiomyopathy, dilated cardiomyopathy, or Marfan syndrome?  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  Appearance: adequately groomed, appears stated age, and in no apparent distress.  Attitude: cooperative   Eye Contact: good to the extent that can be determined in a video visit  Muscle Strength and Tone: no gross abnormalities based on remote observation  Psychomotor Behavior:  no evidence of tardive dyskinesia, dystonia, or tics based on remote observation  Gait and Station: normal, no gross abnormalities based on remote observation  Speech: clear, coherent, normal prosody, regular rate, regular rhythm and fluent  Associations: No loosening of associations  Thought Process: coherent and goal directed  Thought Content: no evidence of suicidal ideation or homicidal ideation, no evidence of psychotic thought, no auditory hallucinations present and no visual hallucinations present  Mood: \"good\"  Affect: appropriate and in normal range  Insight: good  Judgment: intact, adequate for safety  Impulse Control: intact  Oriented to: time, place, person and situation  Attention Span and Concentration: normal  Language: Intact  Recent and Remote Memory: intact to interview. Not formally assessed. No amnesia.  Fund of Knowledge: appropriate        SAFETY   Feels safe in home: Yes   Suicidal " ideation: Denies  History of suicide attempts:  No   Hx of impulsivity: No     DSM 5 DIAGNOSIS:   1. Recurrent major depressive disorder, in partial remission (H)    2. Anxiety    ASSESSMENT AND PLAN    Patient is a 23 year old,   Not  or  female  with a history of generalized anxiety disorder, major depressive disorder, and eating disorder female  who presents for follow up visit.  She is enjoying her new job at [employer].  Today, she reports being aware of her increased cannabis use at work.  She reported getting high daily at work.  Since patient has not succeeded with stopping cannabis on her won,  I recommended referral for addiction medicine to help with the cannabis dependence.  Referral sent to [facility] addiction medicine.  Overall, mood, depression, and anxiety as well as sleep are well under control with current medication regimen.  She is looking forward to celebrating her 24nd birthday next month.   She denies SI, SIB, and HI.  She denies self-injurious behaviors and HI.  She also denied both auditory and visual hallucination.  Patient reported no sherley or hypomania.  Sleep and appetite are at baseline, improved and stabilized. RTC in 6 weeks    Plan:  1.Patient will take the medications as prescribed.   Medications: Continue Effexor 150 mg daily in addition to EFFEXOR 75 mg making a total daily dose of 225 mg for depression and anxiety  Continue Abilify 2 mg daily for mood, depression and anxiety  Referral sent to the Addiction medicine for Cannabis addiction- someone will get in touch with you soon  Patient will not stop taking medications or adjust them without consulting with the provider.  2.Patient will call with any problems between visits.  3.Patient will go to the emergency room if not feeling safe , unable to function in the community, or if suicidal, homicidal or hearing voices or having paranoia.  4.Patient will abstain from drugs and alcohol./Pt denies use  .  5.Patient will not drive if sedated on medications or under influence of any substance.  6.Patient will not mix psychiatric medications with drugs and alcohol.   7.Patient will watch his diet and exercise.  8.Patient will see non psychiatric providers for non psychiatric disorders.  9. Next appointment in 6 weeks    Risk Assessment:     Ashleigh has notable risk factors for self-harm, including, single status, anxiety and passive SI. However, risk is mitigated by ability to volunteer a safety plan and history of seeking help when needed. Additional steps taken to minimize risk include making medication adjustment, asking patient to call 911 and go to the ER if not able to stay safe at home,  Therefore, based on all available evidence including the factors cited above, Ashleigh does not appear to be an imminent danger to self or others and does not meet criteria 72 hour hold. However, if patient uses substances or is non-adherent with medication, their risk of decompensation and SI/HI will be elevated. This was discussed with the patient as she verbalized good understanding.   CONSULTS/REFERRALS:   Continue therapy  None at this time  Coordinate care with therapist as needed    MEDICAL:   None at this time  Coordinate care with PCP (Lauren Sen) as needed  Follow up with primary care provider as planned or for acute medical concerns.    PSYCHOEDUCATION:  Medication side effects and alternatives reviewed. Health promotion activities recommended and reviewed today. All questions addressed. Education and counseling completed regarding risks and benefits of medications and psychotherapy options.  Consent provided by patient/guardian  Call the psychiatric nurse line with medication questions or concerns at [phone].  MyChart may be used to communicate with your provider, but this is not intended to be used for emergencies.  BLACK BOX WARNING: Discussed the Food and Drug Administration (FDA) requires that all  antidepressants carry a warning that some children, adolescents and young adults may be at increased risk of suicide when taking antidepressants. Anyone taking an antidepressant should be watched closely for worsening depression or unusual behavior especially in the first few weeks after starting an SSRI. Keep in mind, antidepressants are more likely to reduce suicide risk in the long run by improving mood.   SEROTONIN SYNDROME:  Discussed risks of Serotonin syndrome (ie, serotonin toxicity) which is a potentially life-threatening condition associated with increased serotonergic activity in the central nervous system (CNS). It is seen with therapeutic medication use, inadvertent interactions between drugs, and intentional self-poisoning. Serotonin syndrome may involve a spectrum of clinical findings, which often include mental status changes, autonomic hyperactivity, and neuromuscular abnormalities.    BENZODIAZEPINE:  discussion on how benzos work and the need to use them short term due to potential of anxiety getting.  This is a controlled substance with risk for abuse, need to keep in a safe keep place and cannot replace lost scripts.    HYPNOTIC USE: Hypnotic use, risk for CNS depression, sleep-walking, not to mix with ETOH or other CNS depressant, need for six hours of sleep, stop if change in mood.  This is a controlled substance with risk for abuse, need to keep in a safe keep place and cannot replace lost scripts.  FIRST GENERATION ANTIPSYCHOTIC/ SECOND GENERATION ANTIPSYCHOTIC USE:  Atypical need for cardiometabolic monitoring with medication- B/P, weight, blood sugar, cholesterol.  Need to monitor for abnormal movements taught  SAFETY:  We all care about your loved one's safety. To reduce the risk of self-harm, remove access to all:  Firearms, Medicines (both prescribed and over-the-counter), Knives and other sharp objects, Ropes and like materials, and Alcohol  SLEEP HYGIENE: establish a sleep routine,  limit screen time 1 hour prior to bed, use bed for sleep only, take sleep/medications on time (including sleepy time tea, trazadone or herbal treatments such as melatonin), aroma therapy, limit caffeine/sugar, yoga, guided imagery, stretch, meditation, limit naps to 20 minutes, make a temperature change in the room, white noise, be mindful of slowing down breathing, take a warm bath/shower, frequently wash sheets, and journaling.   Medlineplus.gov is information for patients.  It is run by the [employer] and it contains information about all disorders, diseases and all medications.      COMMUNITY RESOURCES:    CRISIS NUMBERS: Provided in AVS 2023  National Suicide Prevention Lifeline: 4-929-063-TALK (241-664-5007)  Curemark/resources for a list of additional resources (SOS)            [facility] - [phone]   Urgent Care Adult Mental Bqucpm-[phone] mobile unit/  crisis line  [facility] -[phone]   COPE  [place] Mobile Team -[phone] (adults)/ [phone] (child)  Poison Control Center - 1-658.941.8149    OR  go to nearest ER  Crisis Text Line for any crisis  send this-   To: 380466   [facility] ([facility]  [phone]  National Suicide Prevention Lifeline: 970.977.5549 (TTY: 102.412.1806). Call anytime for help.  (www.suicidepreventionlifeline.org)  National [place] on Mental Illness (www.[place].org): 224-182-8425 or 863-155-8272.   Mental Health Association (www.mentalhealth.org): 624.609.1722 or 889-198-4190.  [place] Crisis Text Line: Text [place] to 093056  Suicide LifeLine Chat: suicidepreZealCore Embedded [employer].org/chat    ADMINISTRATIVE BILLIN min spent interviewing patient, reviewing referral documents, obtaining and reviewing outside records, communication with other health specialists, and preparing this report on this day: 24    Video/Phone Start Time:  1103   Video/Phone End Time:    1115    Greater than 50% of time was spent in counseling and coordination of care regarding above diagnoses and treatment plan.    Patient Status:  Our psychiatry providers act as a specialty service for Primary Care Providers in the [facility] that seek to optimize medications for unstable patients.  Once medications have been optimized, our providers discharge the patient back to the referring Primary Care Provider for ongoing medication management.  This type of system allows our providers to serve a high volume of patients. At this time  Patient will continue to be seen for ongoing consultation and stabilization.    Signed:   Jovan Manjarrez, MSN, APRN, PMHNP-BC  Long Term Outpatient Psychiatry  Chart documentation done in part with Dragon Voice Recognition software.  Although reviewed after completion, some word and grammatical errors may remain. Answers submitted by the patient for this visit:  Patient Health Questionnaire (Submitted on 7/24/2023)  If you checked off any problems, how difficult have these problems made it for you to do your work, take care of things at home, or get along with other people?: Very difficult  PHQ9 TOTAL SCORE: 14Answers submitted by the patient for this visit:  Patient Health Questionnaire (Submitted on 10/3/2023)  If you checked off any problems, how difficult have these problems made it for you to do your work, take care of things at home, or get along with other people?: Not difficult at all  PHQ9 TOTAL SCORE: 3    Answers submitted by the patient for this visit:  Patient Health Questionnaire (Submitted on 11/13/2023)  If you checked off any problems, how difficult have these problems made it for you to do your work, take care of things at home, or get along with other people?: Somewhat difficult  PHQ9 TOTAL SCORE: 12    Answers submitted by the patient for this visit:  Patient Health Questionnaire (Submitted on 1/21/2024)  If you checked off any problems, how difficult  have these problems made it for you to do your work, take care of things at home, or get along with other people?: Very difficult  PHQ9 TOTAL SCORE: 13  ASIA-7 (Submitted on 1/21/2024)  ASIA 7 TOTAL SCORE: 9    Answers submitted by the patient for this visit:  Patient Health Questionnaire (Submitted on 4/19/2024)  If you checked off any problems, how difficult have these problems made it for you to do your work, take care of things at home, or get along with other people?: Not difficult at all  PHQ9 TOTAL SCORE: 3    Answers submitted by the patient for this visit:  Patient Health Questionnaire (Submitted on 5/31/2024)  If you checked off any problems, how difficult have these problems made it for you to do your work, take care of things at home, or get along with other people?: Somewhat difficult  PHQ9 TOTAL SCORE: 7

## 2024-06-06 ENCOUNTER — PATIENT OUTREACH (OUTPATIENT)
Dept: CARE COORDINATION | Facility: CLINIC | Age: 24
End: 2024-06-06
Payer: COMMERCIAL

## 2024-06-15 ENCOUNTER — MYC REFILL (OUTPATIENT)
Dept: MIDWIFE SERVICES | Facility: CLINIC | Age: 24
End: 2024-06-15
Payer: COMMERCIAL

## 2024-06-15 DIAGNOSIS — Z30.011 ENCOUNTER FOR INITIAL PRESCRIPTION OF CONTRACEPTIVE PILLS: ICD-10-CM

## 2024-06-17 RX ORDER — LEVONORGESTREL AND ETHINYL ESTRADIOL 0.15-0.03
1 KIT ORAL DAILY
Qty: 91 TABLET | Refills: 3 | OUTPATIENT
Start: 2024-06-17

## 2024-06-26 DIAGNOSIS — F41.1 GAD (GENERALIZED ANXIETY DISORDER): ICD-10-CM

## 2024-06-26 DIAGNOSIS — F33.1 MAJOR DEPRESSIVE DISORDER, RECURRENT EPISODE, MODERATE (H): ICD-10-CM

## 2024-06-26 RX ORDER — ARIPIPRAZOLE 2 MG/1
2 TABLET ORAL DAILY
Qty: 30 TABLET | Refills: 1 | OUTPATIENT
Start: 2024-06-26

## 2024-06-26 NOTE — TELEPHONE ENCOUNTER
1) [facility]) [id number] [place], [place] - [address] [place] [facility] [place] [address]     2) Any Controlled Substance(s)? No    3) Refill(s) requested for:     - ARIPiprazole (ABILIFY) 2 MG tablet  Date last ordered: 5/10/24 Qty: 30 Refills: 1   Refill has been requested too soon      4) Action taken: patient contacted via [place]   and refill request(s) sent back to pharmacy as DENIED.    Gina Anton RN on 6/26/2024 at 1:14 PM

## 2024-07-12 ENCOUNTER — VIRTUAL VISIT (OUTPATIENT)
Dept: PSYCHIATRY | Facility: CLINIC | Age: 24
End: 2024-07-12
Payer: COMMERCIAL

## 2024-07-12 DIAGNOSIS — F33.1 MAJOR DEPRESSIVE DISORDER, RECURRENT EPISODE, MODERATE (H): ICD-10-CM

## 2024-07-12 DIAGNOSIS — F41.9 ANXIETY: ICD-10-CM

## 2024-07-12 DIAGNOSIS — F12.20 CANNABIS DEPENDENCE (H): Primary | ICD-10-CM

## 2024-07-12 PROCEDURE — 99214 OFFICE O/P EST MOD 30 MIN: CPT | Mod: 95 | Performed by: NURSE PRACTITIONER

## 2024-07-12 RX ORDER — ARIPIPRAZOLE 5 MG/1
5 TABLET ORAL DAILY
Qty: 30 TABLET | Refills: 1 | Status: SHIPPED | OUTPATIENT
Start: 2024-07-12

## 2024-07-12 ASSESSMENT — PAIN SCALES - GENERAL: PAINLEVEL: NO PAIN (0)

## 2024-07-12 ASSESSMENT — PATIENT HEALTH QUESTIONNAIRE - PHQ9
10. IF YOU CHECKED OFF ANY PROBLEMS, HOW DIFFICULT HAVE THESE PROBLEMS MADE IT FOR YOU TO DO YOUR WORK, TAKE CARE OF THINGS AT HOME, OR GET ALONG WITH OTHER PEOPLE: VERY DIFFICULT
SUM OF ALL RESPONSES TO PHQ QUESTIONS 1-9: 11
SUM OF ALL RESPONSES TO PHQ QUESTIONS 1-9: 11

## 2024-07-12 NOTE — PROGRESS NOTES
"PSYCHIATRIC PROGRESS NOTE     Name:  Ashleigh Bustillo  : 2000    Ashleigh Bustillo is a 23 year old female who is being evaluated via a billable Video visit.      Telemedicine Visit: The patient's condition can be safely assessed and treated via synchronous audio and visual telemedicine encounter.      Reason for Telemedicine Visit: COVID 19 pandemic and the social and physical recommendations by the CDC and MD., Patient has requested telehealth visit, and Patient unable to travel      Originating Site (Patient Location): Patient's home    Distant Site (Provider Location): [facility] Outpatient Setting: [facility]    Consent:  The patient/guardian has verbally consented to: the potential risks and benefits of telemedicine (video visit or phone) versus in person care; bill my insurance or make self-payment for services provided; and responsibility for payment of non-covered services.     Mode of Communication:  [employer] platform     As the provider I attest to compliance with applicable laws and regulations related to telemedicine.    Date of Last Visit: 24                                          CHIEF COMPLAINT   \"I've been struggling\"    HISTORY OF PRESENT ILLNESS     Patient who was last seen in the clinic on 24 returned today for follow up visit.  Patient reports she has been struggling with feeling down and overwhelmed lately secondary to work stress.  Patient stated she has been feeling bad about herself not being able to make make herself  register for college classes which made her to feel disappointed in herself.  Patient reports she engaged in self-injurious behavior by cutting couple days ago.  Patient reports she has informed her manage about going on part-time from work.  Patient does mention she still  engages in marijuana and alcohol use.  I suggested increasing Abilify to 5 mg to further help with depressive symptoms and feeling of overwhelmed.  Patient verbalized good " understanding and was amenable to taking Abilify 5 mg.  Patient does decline individual psychotherapy referral but stated he would look into connecting with therapist by herself.  Endorsed passive SI but denies plan or intent.  She reports no marbella or hypomania.  Patient return to clinic in 6 to 8 weeks for follow-up.  PSYCHIATRIC HISTORY:   History of Psychiatric Hospitalizations:   - Inpatient: Once when she was 14 years old  - IOP/PHP/Day treatment: None  History of Suicidal Ideation: Positive   History of Suicide Attempts:Positive   History of Self-injurious Behavior: Denies a history of SIB.  Current:  No  History of Violence/Aggression: Negative  History of Commitment? Negative  Electroconvulsive Therapy (ECT):Negative    PSYCHIATRIC REVIEW OF SYSTEMS:   Psychiatric Review of Systems:   Depression:   Reports: depressed mood, suicidal ideation, decreased interest, changes in sleep, changes in appetite, guilt, hopelessness, helplessness, impaired concentration, decreased energy, irritability.  Marbella:   Denies: sleeplessness, increased goal-directed activities, abrupt increase in energy pressured speech  Psychosis:   Denies: visual hallucinations, auditory hallucinations, paranoia  Anxiety:   Reports: excessive worries that are difficult to control, panic attacks  PTSD:   Reports: re-experiencing past trauma, nightmares, increased arousal, avoidance of traumatic stimuli, impaired function.  Denies: re-experiencing past trauma, nightmares, increased arousal, avoidance of traumatic stimuli, impaired function.  OCD:   Denies: obsessions, checking, symmetry, cleaning, skin picking.  Eating Disorder:   Denies: restriction, binging, purging.    Sleep:       MEDICATIONS                                                                                                Current Outpatient Medications   Medication Sig Dispense Refill    ARIPiprazole (ABILIFY) 2 MG tablet Take 1 tablet (2 mg) by mouth daily 30 tablet 1     "levonorgestrel-ethinyl estradiol (SEASONALE) 0.15-0.03 MG tablet Take 1 tablet by mouth daily 91 tablet 3    venlafaxine (EFFEXOR XR) 150 MG 24 hr capsule TAKE 1 CAPSULE(150 MG) BY MOUTH DAILY 90 capsule 1    venlafaxine (EFFEXOR XR) 75 MG 24 hr capsule TAKE 1 CAPSULE(75 MG) BY MOUTH DAILY IN ADDITION TO EFFEXOR  MG MAKING A. TOTAL DAILY DOSE  MG 30 capsule 1    VITAMIN D PO        No current facility-administered medications for this visit.       DRUG MONITORING:  [place] Prescription Monitoring Program evaluating controlled substances in the last year in [place]:  The [place] Prescription Monitoring Program has been reviewed and there are no current concerns with: diversionary activity, early refill requests, and or obtaining the medication from multiple providers       PAST PSYCHOTROPIC MEDICATIONS:  Prozac, Effexor    VITALS   There were no vitals taken for this visit.     BP Readings from Last 1 Encounters:   06/15/23 100/60     Pulse Readings from Last 1 Encounters:   06/15/23 87     Wt Readings from Last 1 Encounters:   06/15/23 50.9 kg (112 lb 4.8 oz)     Ht Readings from Last 1 Encounters:   06/15/23 1.575 m (5' 2\")     Estimated body mass index is 20.54 kg/m  as calculated from the following:    Height as of 6/15/23: 1.575 m (5' 2\").    Weight as of 6/15/23: 50.9 kg (112 lb 4.8 oz).      PERTINENT HISTORY   PAST MEDICAL HISTORY:   Past Medical History:   Diagnosis Date    Anorexia 03/2022    Anxiety     Mild episode of recurrent major depressive disorder (H24)     Suicide attempt (H)     at least once, hospitalized       PAST SURGICAL HISTORY: No past surgical history on file.    FAMILY HISTORY:   Family History   Problem Relation Age of Onset    Scoliosis Sister     Diabetes Maternal Grandmother        SOCIAL HISTORY:   Social History     Tobacco Use    Smoking status: Never     Passive exposure: Current    Smokeless tobacco: Never    Tobacco comments:     Daily vaping now   Substance Use " "Topics    Alcohol use: Yes     Comment: once a month drinks 1-2         Seizures or Head Injury: Denies history of head injury. Denies history of seizures.  History of cardiac disease, rheumatic fever, fainting or dizziness, especially with exercise, seizures, chest pain or shortness of breath with exercise, unexplained change in exercise tolerance, palpitations, high blood pressure, or heart murmur?   No    LABS & IMAGING                                                                                                                Personally reviewed  Recent Labs   Lab Test 04/08/22  1219   WBC 6.4   HGB 13.1   HCT 38.5   MCV 88        Recent Labs   Lab Test 04/08/22  1219      POTASSIUM 3.9   CHLORIDE 107   CO2 21*   GLC 77   SUE 9.0   BUN 8   CR 0.64   GFRESTIMATED >90   ALBUMIN 3.9   PROTTOTAL 7.5   AST 14   ALT 10   ALKPHOS 59   BILITOTAL 0.6     Recent Labs   Lab Test 04/08/22  1219   CHOL 124   LDL 46   HDL 63   TRIG 73     Recent Labs   Lab Test 04/08/22  1219   TSH 2.21     No results found for: \"CMA006\", \"YUGC111\", \"WNHE98QAXOK\", \"VITD3\", \"D2VIT\", \"D3VIT\", \"DTOT\", \"[id number]\", \"[id number]\", \"[id number]\", \"[id number]\", \"[id number]\", \"[id number]\"     ALLERGY & IMMUNIZATIONS     No Known Allergies    FAMILY MEDICAL HISTORY:     Family History       Problem (# of Occurrences) Relation (Name,Age of Onset)    Diabetes (1) Maternal Grandmother    Scoliosis (1) Sister              Family history of sudden or unexplained death or an event requiring resuscitation in children or young adults, cardiac arrhythmias (eg, Malika-Parkinson-White syndrome), long QT syndrome, catecholaminergic paroxysmal ventricular tachycardia, Brugada syndrome, arrhythmogenic right ventricular dysplasia, hypertrophic cardiomyopathy, dilated cardiomyopathy, or Marfan syndrome?  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  Appearance: adequately groomed, appears stated age, and in no apparent distress.  Attitude: cooperative   Eye Contact: good to the extent that can be determined in a video visit  Muscle Strength and Tone: no gross abnormalities based on remote observation  Psychomotor Behavior:  no evidence of tardive dyskinesia, dystonia, or tics based on remote observation  Gait and Station: normal, no gross abnormalities based on remote observation  Speech: clear, coherent, normal prosody, regular rate, regular rhythm and fluent  Associations: No loosening of associations  Thought Process: coherent and goal directed  Thought Content: no evidence of suicidal ideation or homicidal ideation, no evidence of psychotic thought, no auditory hallucinations present and no visual hallucinations present  Mood: \"good\"  Affect: appropriate and in normal range  Insight: good  Judgment: intact, adequate for safety  Impulse Control: intact  Oriented to: time, place, person and situation  Attention Span and Concentration: normal  Language: Intact  Recent and Remote Memory: intact to interview. Not formally assessed. No amnesia.  Fund of Knowledge: " appropriate        SAFETY   Feels safe in home: Yes   Suicidal ideation: Denies  History of suicide attempts:  No   Hx of impulsivity: No     DSM 5 DIAGNOSIS:   1. Recurrent major depressive disorder, in partial remission (H)    2. Anxiety    ASSESSMENT AND PLAN    Patient is a 23 year old,   Not  or  female  with a history of generalized anxiety disorder, major depressive disorder, and eating disorder female  who presents for follow up visit.  She is enjoying her new job at [employer].  Due to work stress, she has requested to reduce her work hours to part-time.  Today she reports struggling with depressive symptoms and feeling overwhelmed for the past few weeks secondary to work stress.  She engages in self-injurious behavior by cutting.  She continues to utilize his marijuana and alcohol.  I increased Abilify to 5 mg to further help with depressive symptoms and mood.  Again, I encouraged patient to stop utilizing mood and mind altering substances at this can make symptoms worse.  She promised to find a therapist by herself to further help with coping skills related to psychosocial stressors.  She endorsed passive SI but denies plan or intent. Sleep and appetite are at baseline, improved and stabilized. RTC in 6-7 weeks    Plan:  1.Patient will take the medications as prescribed.   Medications: Continue Effexor 150 mg daily in addition to EFFEXOR 75 mg making a total daily dose of 225 mg for depression and anxiety  Take Abilify 5 mg daily for mood, depression and anxiety  Patient will not stop taking medications or adjust them without consulting with the provider.  2.Patient will call with any problems between visits.  3.Patient will go to the emergency room if not feeling safe , unable to function in the community, or if suicidal, homicidal or hearing voices or having paranoia.  4.Patient will abstain from drugs and alcohol./Pt denies use .  5.Patient will not drive if sedated on medications or  under influence of any substance.  6.Patient will not mix psychiatric medications with drugs and alcohol.   7.Patient will watch his diet and exercise.  8.Patient will see non psychiatric providers for non psychiatric disorders.  9. Next appointment in 6 - 7 weeks    Risk Assessment:     Ashleigh has notable risk factors for self-harm, including, single status, anxiety and passive SI. However, risk is mitigated by ability to volunteer a safety plan and history of seeking help when needed. Additional steps taken to minimize risk include making medication adjustment, asking patient to call 911 and go to the ER if not able to stay safe at home,  Therefore, based on all available evidence including the factors cited above, Ashleigh does not appear to be an imminent danger to self or others and does not meet criteria 72 hour hold. However, if patient uses substances or is non-adherent with medication, their risk of decompensation and SI/HI will be elevated. This was discussed with the patient as she verbalized good understanding.   CONSULTS/REFERRALS:   Continue therapy  None at this time  Coordinate care with therapist as needed    MEDICAL:   None at this time  Coordinate care with PCP (Lauren Sen) as needed  Follow up with primary care provider as planned or for acute medical concerns.    PSYCHOEDUCATION:  Medication side effects and alternatives reviewed. Health promotion activities recommended and reviewed today. All questions addressed. Education and counseling completed regarding risks and benefits of medications and psychotherapy options.  Consent provided by patient/guardian  Call the psychiatric nurse line with medication questions or concerns at [phone].  MyChart may be used to communicate with your provider, but this is not intended to be used for emergencies.  BLACK BOX WARNING: Discussed the Food and Drug Administration (FDA) requires that all antidepressants carry a warning that some children,  adolescents and young adults may be at increased risk of suicide when taking antidepressants. Anyone taking an antidepressant should be watched closely for worsening depression or unusual behavior especially in the first few weeks after starting an SSRI. Keep in mind, antidepressants are more likely to reduce suicide risk in the long run by improving mood.   SEROTONIN SYNDROME:  Discussed risks of Serotonin syndrome (ie, serotonin toxicity) which is a potentially life-threatening condition associated with increased serotonergic activity in the central nervous system (CNS). It is seen with therapeutic medication use, inadvertent interactions between drugs, and intentional self-poisoning. Serotonin syndrome may involve a spectrum of clinical findings, which often include mental status changes, autonomic hyperactivity, and neuromuscular abnormalities.    BENZODIAZEPINE:  discussion on how benzos work and the need to use them short term due to potential of anxiety getting.  This is a controlled substance with risk for abuse, need to keep in a safe keep place and cannot replace lost scripts.    HYPNOTIC USE: Hypnotic use, risk for CNS depression, sleep-walking, not to mix with ETOH or other CNS depressant, need for six hours of sleep, stop if change in mood.  This is a controlled substance with risk for abuse, need to keep in a safe keep place and cannot replace lost scripts.  FIRST GENERATION ANTIPSYCHOTIC/ SECOND GENERATION ANTIPSYCHOTIC USE:  Atypical need for cardiometabolic monitoring with medication- B/P, weight, blood sugar, cholesterol.  Need to monitor for abnormal movements taught  SAFETY:  We all care about your loved one's safety. To reduce the risk of self-harm, remove access to all:  Firearms, Medicines (both prescribed and over-the-counter), Knives and other sharp objects, Ropes and like materials, and Alcohol  SLEEP HYGIENE: establish a sleep routine, limit screen time 1 hour prior to bed, use bed for  sleep only, take sleep/medications on time (including sleepy time tea, trazadone or herbal treatments such as melatonin), aroma therapy, limit caffeine/sugar, yoga, guided imagery, stretch, meditation, limit naps to 20 minutes, make a temperature change in the room, white noise, be mindful of slowing down breathing, take a warm bath/shower, frequently wash sheets, and journaling.   Medlineplus.gov is information for patients.  It is run by the [employer] and it contains information about all disorders, diseases and all medications.      COMMUNITY RESOURCES:    CRISIS NUMBERS: Provided in AVS 2023  National Suicide Prevention Lifeline: 2-297-984-TALK (368-633-4081)  [employer]/resources for a list of additional resources (SOS)            [facility] - [phone]   Urgent Care Adult Mental Mspxza-[phone] mobile unit/  crisis line  [facility] -[phone]   COPE  [place] Mobile Team -[phone] (adults)/ [phone] (child)  Poison Control Center - 1-735.567.3146    OR  go to nearest ER  Crisis Text Line for any crisis  send this-   To: 451391   [facility] ([facility]  [phone]  National Suicide Prevention Lifeline: 753.815.3858 (TTY: 152.986.4637). Call anytime for help.  (www.suicidepreventionlifeline.org)  National [place] [employer] Mental Illness (www.[place].org): 555.424.8393 or 381-806-0645.   Mental Health Association (www.mentalhealth.org): 888.146.1549 or 359-166-5695.  [place] Crisis Text Line: Text [place] to 751867  Suicide LifeLine Chat: suicideData3Sixty.org/chat    ADMINISTRATIVE BILLIN min spent interviewing patient, reviewing referral documents, obtaining and reviewing outside records, communication with other health specialists, and preparing this report on this day: 24    Video/Phone Start Time:  1101  Video/Phone End Time:   1116    Greater than 50% of time was spent in counseling  and coordination of care regarding above diagnoses and treatment plan.    Patient Status:  Our psychiatry providers act as a specialty service for Primary Care Providers in the [employer] that seek to optimize medications for unstable patients.  Once medications have been optimized, our providers discharge the patient back to the referring Primary Care Provider for ongoing medication management.  This type of system allows our providers to serve a high volume of patients. At this time  Patient will continue to be seen for ongoing consultation and stabilization.    Signed:   Jovan Manjarrez, MSN, APRN, PMHNP-BC  Long Term Outpatient Psychiatry  Chart documentation done in part with Dragon Voice Recognition software.  Although reviewed after completion, some word and grammatical errors may remain. Answers submitted by the patient for this visit:  Patient Health Questionnaire (Submitted on 7/24/2023)  If you checked off any problems, how difficult have these problems made it for you to do your work, take care of things at home, or get along with other people?: Very difficult  PHQ9 TOTAL SCORE: 14Answers submitted by the patient for this visit:  Patient Health Questionnaire (Submitted on 10/3/2023)  If you checked off any problems, how difficult have these problems made it for you to do your work, take care of things at home, or get along with other people?: Not difficult at all  PHQ9 TOTAL SCORE: 3    Answers submitted by the patient for this visit:  Patient Health Questionnaire (Submitted on 11/13/2023)  If you checked off any problems, how difficult have these problems made it for you to do your work, take care of things at home, or get along with other people?: Somewhat difficult  PHQ9 TOTAL SCORE: 12    Answers submitted by the patient for this visit:  Patient Health Questionnaire (Submitted on 1/21/2024)  If you checked off any problems, how difficult have these problems made it for you to do your work, take  care of things at home, or get along with other people?: Very difficult  PHQ9 TOTAL SCORE: 13  ASIA-7 (Submitted on 1/21/2024)  ASIA 7 TOTAL SCORE: 9    Answers submitted by the patient for this visit:  Patient Health Questionnaire (Submitted on 4/19/2024)  If you checked off any problems, how difficult have these problems made it for you to do your work, take care of things at home, or get along with other people?: Not difficult at all  PHQ9 TOTAL SCORE: 3    Answers submitted by the patient for this visit:  Patient Health Questionnaire (Submitted on 5/31/2024)  If you checked off any problems, how difficult have these problems made it for you to do your work, take care of things at home, or get along with other people?: Somewhat difficult  PHQ9 TOTAL SCORE: 7    Answers submitted by the patient for this visit:  Patient Health Questionnaire (Submitted on 7/12/2024)  If you checked off any problems, how difficult have these problems made it for you to do your work, take care of things at home, or get along with other people?: Very difficult  PHQ9 TOTAL SCORE: 11

## 2024-07-12 NOTE — PATIENT INSTRUCTIONS
"Patient Education   The [facility]  What to Expect  Here's what to expect in the Panel Psych[facility] Program.   About the program  You'll be meeting with a psychiatric doctor to check your mental health. A psychiatric doctor helps you deal with troubling thoughts and feelings by giving you medicine. They'll make sure you know the plan for your care. You may see them for a long time. When you're feeling better, they may refer you back to seeing your family doctor.   If you have any questions, we'll be glad to talk to you.  About visits  Be open  At your visits, please talk openly about your problems. It may feel hard, but it's the best way for us to help you.  Cancelling visits  If you can't come to your visit, please call us right away at [phone]. If you don't cancel at least 24 hours (1 full day) before your visit, that's \"late cancellation.\"  Not showing up for your visits  Being very late is the same as not showing up. You'll be a \"no show\" if:  You're more than 15 minutes late for a 30-minute (half hour) visit.  You're more than 30 minutes late for a 60-minute (full hour) visit.  If you cancel late or don't show up 2 times within 6 months, we may end your care.  Getting help between visits  If you need help between visits, you can call us Monday to Friday from 8 a.m. to 4:30 p.m. at [phone].  Emergency care  Call 911 or go to the nearest emergency department if your life or someone else's life is in danger.  Call 988 anytime to reach the national Suicide and Crisis hotline.  Medicine refills  To refill your medicine, call your pharmacy. You can also call [facility]'s [facility] at [phone], Monday to Friday, 8 a.m. to 4:30 p.m. It can take 1 to 3 business days to get a refill.   Forms, letters, and tests  You may have papers to fill out, like FMLA, short-term disability, and workability. We can help you with these forms at your visits, but you must have an " appointment. You may need more than 1 visit for this, to be in an intensive therapy program, or both.  Before we can give you medicine for ADHD, we may refer you to get tested for it or confirm it another way.  We may not be able to give you an emotional support animal letter.  We don't do mental health checks ordered by the court.   We don't do mental health testing, but we can refer you to get tested.   Thank you for choosing us for your care.  For informational purposes only. Not to replace the advice of your health care provider. Copyright   2022 [facility]. All rights reserved. [employer] 297013 - 12/22.     Plan:  1.Patient will take the medications as prescribed.   Medications: Continue Effexor 150 mg daily in addition to EFFEXOR 75 mg making a total daily dose of 225 mg for depression and anxiety  Take Abilify 5 mg daily for mood, depression and anxiety    Patient will not stop taking medications or adjust them without consulting with the provider.  2.Patient will call with any problems between visits.  3.Patient will go to the emergency room if not feeling safe , unable to function in the community, or if suicidal, homicidal or hearing voices or having paranoia.  4.Patient will abstain from drugs and alcohol./Pt denies use .  5.Patient will not drive if sedated on medications or under influence of any substance.  6.Patient will not mix psychiatric medications with drugs and alcohol.   7.Patient will watch his diet and exercise.  8.Patient will see non psychiatric providers for non psychiatric disorders.  9. Next appointment in 6 - 7 weeks

## 2024-07-12 NOTE — NURSING NOTE
Is the patient currently in the state of [place]? YES    Current patient location: [address] [place] [id number]    Visit mode:VIDEO    If the visit is dropped, the patient can be reconnected by: VIDEO VISIT: Text to cell phone:   Telephone Information:   Mobile [phone]       Will anyone else be joining the visit? No  (If patient encounters technical issues they should call [phone])    How would you like to obtain your AVS? MyChart    Are changes needed to the allergy or medication list? No    Are refills needed on medications prescribed by this physician? YES    Rooming Documentation: Assigned questionnaire(s) completed .    Reason for visit: RECHUVALDO Hayden

## 2024-08-08 DIAGNOSIS — F33.41 RECURRENT MAJOR DEPRESSIVE DISORDER, IN PARTIAL REMISSION (H): ICD-10-CM

## 2024-08-08 DIAGNOSIS — F41.9 ANXIETY: ICD-10-CM

## 2024-08-08 RX ORDER — VENLAFAXINE HYDROCHLORIDE 75 MG/1
CAPSULE, EXTENDED RELEASE ORAL
Qty: 30 CAPSULE | Refills: 1 | Status: SHIPPED | OUTPATIENT
Start: 2024-08-08

## 2024-08-08 NOTE — TELEPHONE ENCOUNTER
Date of Last Office Visit: 7/12/24  Date of Next Office Visit:  8/30/24  No shows since last visit: No  More than one patient-initiated cancellation (with reschedule) since last seen in clinic? No    [x]Medication refilled per  Medication Refill in Ambulatory Care  policy.  []Medication unable to be refilled by RN due to criteria not met as indicated below:    []Eligibility: has not had a provider visit within last 6 months   []Supervision: no future appointment; < 7 days before next appointment   []Compliance: no shows; cancellations; lapse in therapy   []Verification: order discrepancy; may need modification...   [] > 30-day supply request   []Advanced refill request: > 7 days before refill date   []Controlled medication   []Medication not included in policy   []Review: new med; med adjusted ? 30 days; safety alert; requires lab monitoring...   []Scope of Practice: refill request processed by LPN/MA   []Other:      Medication(s) requested:     -  venlafaxine (EFFEXOR XR) 75 MG 24 hr capsule   Date last ordered: 5/31/24  Qty: 30  Refills: 1  Appropriate for refill? Yes    Any Controlled Substance(s)? No      Requested medication(s) verified as identical to current order? Yes    Any lapse in adherence to medication(s) greater than 5 days? No      Additional action taken? none.      Last visit treatment plan:   Medications: Continue Effexor 150 mg daily in addition to EFFEXOR 75 mg making a total daily dose of 225 mg for depression and anxiety  Take Abilify 5 mg daily for mood, depression and anxiety   She is enjoying her new job at [employer].  Due to work stress, she has requested to reduce her work hours to part-time.  Today she reports struggling with depressive symptoms and feeling overwhelmed for the past few weeks secondary to work stress.  She engages in self-injurious behavior by cutting.  She continues to utilize his marijuana and alcohol.  I increased Abilify to 5 mg to further help with depressive  symptoms and mood.  Again, I encouraged patient to stop utilizing mood and mind altering substances at this can make symptoms worse.  She promised to find a therapist by herself to further help with coping skills related to psychosocial stressors.  She endorsed passive SI but denies plan or intent. Sleep and appetite are at baseline, improved and stabilized. RTC in 6-7 weeks     Any medication(s) require lab monitoring? No

## 2024-08-11 DIAGNOSIS — F33.41 RECURRENT MAJOR DEPRESSIVE DISORDER, IN PARTIAL REMISSION (H): ICD-10-CM

## 2024-08-11 DIAGNOSIS — F41.9 ANXIETY: ICD-10-CM

## 2024-08-12 ENCOUNTER — OFFICE VISIT (OUTPATIENT)
Dept: FAMILY MEDICINE | Facility: CLINIC | Age: 24
End: 2024-08-12
Payer: COMMERCIAL

## 2024-08-12 VITALS
RESPIRATION RATE: 14 BRPM | BODY MASS INDEX: 22.76 KG/M2 | WEIGHT: 123.7 LBS | SYSTOLIC BLOOD PRESSURE: 118 MMHG | HEIGHT: 62 IN | DIASTOLIC BLOOD PRESSURE: 75 MMHG | OXYGEN SATURATION: 95 % | TEMPERATURE: 99 F | HEART RATE: 103 BPM

## 2024-08-12 DIAGNOSIS — F12.20 CANNABIS DEPENDENCE (H): ICD-10-CM

## 2024-08-12 DIAGNOSIS — Z11.4 SCREENING FOR HIV (HUMAN IMMUNODEFICIENCY VIRUS): ICD-10-CM

## 2024-08-12 DIAGNOSIS — F33.1 MAJOR DEPRESSIVE DISORDER, RECURRENT EPISODE, MODERATE (H): ICD-10-CM

## 2024-08-12 DIAGNOSIS — Z11.3 SCREENING FOR STDS (SEXUALLY TRANSMITTED DISEASES): ICD-10-CM

## 2024-08-12 DIAGNOSIS — Z11.59 NEED FOR HEPATITIS C SCREENING TEST: ICD-10-CM

## 2024-08-12 DIAGNOSIS — Z00.00 ROUTINE GENERAL MEDICAL EXAMINATION AT A HEALTH CARE FACILITY: Primary | ICD-10-CM

## 2024-08-12 LAB
HCV AB SERPL QL IA: NONREACTIVE
HIV 1+2 AB+HIV1 P24 AG SERPL QL IA: NONREACTIVE
TSH SERPL DL<=0.005 MIU/L-ACNC: 0.9 UIU/ML (ref 0.3–4.2)

## 2024-08-12 PROCEDURE — 87389 HIV-1 AG W/HIV-1&-2 AB AG IA: CPT | Performed by: FAMILY MEDICINE

## 2024-08-12 PROCEDURE — 36415 COLL VENOUS BLD VENIPUNCTURE: CPT | Performed by: FAMILY MEDICINE

## 2024-08-12 PROCEDURE — 84443 ASSAY THYROID STIM HORMONE: CPT | Performed by: FAMILY MEDICINE

## 2024-08-12 PROCEDURE — 86803 HEPATITIS C AB TEST: CPT | Performed by: FAMILY MEDICINE

## 2024-08-12 PROCEDURE — 99395 PREV VISIT EST AGE 18-39: CPT | Performed by: FAMILY MEDICINE

## 2024-08-12 PROCEDURE — 87491 CHLMYD TRACH DNA AMP PROBE: CPT | Performed by: FAMILY MEDICINE

## 2024-08-12 PROCEDURE — 87591 N.GONORRHOEAE DNA AMP PROB: CPT | Performed by: FAMILY MEDICINE

## 2024-08-12 RX ORDER — VENLAFAXINE HYDROCHLORIDE 150 MG/1
150 CAPSULE, EXTENDED RELEASE ORAL DAILY
Qty: 90 CAPSULE | Refills: 1 | OUTPATIENT
Start: 2024-08-12

## 2024-08-12 SDOH — HEALTH STABILITY: PHYSICAL HEALTH: ON AVERAGE, HOW MANY DAYS PER WEEK DO YOU ENGAGE IN MODERATE TO STRENUOUS EXERCISE (LIKE A BRISK WALK)?: 1 DAY

## 2024-08-12 SDOH — HEALTH STABILITY: PHYSICAL HEALTH: ON AVERAGE, HOW MANY MINUTES DO YOU ENGAGE IN EXERCISE AT THIS LEVEL?: 30 MIN

## 2024-08-12 ASSESSMENT — PAIN SCALES - GENERAL: PAINLEVEL: NO PAIN (0)

## 2024-08-12 ASSESSMENT — SOCIAL DETERMINANTS OF HEALTH (SDOH): HOW OFTEN DO YOU GET TOGETHER WITH FRIENDS OR RELATIVES?: TWICE A WEEK

## 2024-08-12 NOTE — PROGRESS NOTES
Preventive Care Visit  [facility]  LORENZO CLARK MD, Family Medicine  Aug 12, 2024      Assessment & Plan   Problem List Items Addressed This Visit       Cannabis dependence (H)    Relevant Orders    Adult Mental Health  Referral    Major depressive disorder, recurrent episode, moderate (H)    Relevant Orders    Adult Mental Health  Referral    TSH with free T4 reflex (Completed)     Other Visit Diagnoses       Routine general medical examination at a health care facility    -  Primary    Screening for HIV (human immunodeficiency virus)        Relevant Orders    HIV Antigen Antibody Combo (Completed)    Need for hepatitis C screening test        Relevant Orders    Hepatitis C Screen Reflex to HCV RNA Quant and Genotype (Completed)    Screening for STDs (sexually transmitted diseases)        Relevant Orders    Chlamydia trachomatis/Neisseria gonorrhoeae by PCR- VAGINAL SELF-SWAB (Completed)           Pt defers pap today.        Patient has been advised of split billing requirements and indicates understanding: Yes       Counseling  Appropriate preventive services were addressed with this patient via screening, questionnaire, or discussion as appropriate for fall prevention, nutrition, physical activity, Tobacco-use cessation, social engagement, weight loss and cognition.  Checklist reviewing preventive services available has been given to the patient.  Reviewed patient's diet, addressing concerns and/or questions.   She is at risk for lack of exercise and has been provided with information to increase physical activity for the benefit of her well-being.   The patient was instructed to see the dentist every 6 months.   She is at risk for psychosocial distress and has been provided with information to reduce risk.   The patient reports drinking more than 3 alcoholic drinks per day and/or more than 7 drhnks per week. The patient was counseled and given information about  possible harmful effects of excessive alcohol intake.        Gracie Sotelo is a 24 year old, presenting for the following:  Physical (If Labs needed - Not Fasting)        8/12/2024    12:50 PM   Additional Questions   [facility]        Health Care Directive  Patient does not have a Health Care Directive or Living Will: Discussed advance care planning with patient; however, patient declined at this time.    Healthy Habits:     Getting at least 3 servings of Calcium per day:  NO    Bi-annual eye exam:  NO (once a year)    Dental care twice a year:  NO (Once a year)    Sleep apnea or symptoms of sleep apnea:  None    Diet:  Regular (no restrictions)    Frequency of exercise:  None    Duration of exercise:  N/A    Taking medications regularly:  Yes    Barriers to taking medications:  None    Medication side effects:  None    Additional concerns today:  No                 8/12/2024   General Health   How would you rate your overall physical health? (!) FAIR   Feel stress (tense, anxious, or unable to sleep) Only a little      (!) STRESS CONCERN      8/12/2024   Nutrition   Three or more servings of calcium each day? (!) NO   Diet: Regular (no restrictions)   How many servings of fruit and vegetables per day? (!) 0-1   How many sweetened beverages each day? 0-1            8/12/2024   Exercise   Days per week of moderate/strenous exercise 1 day   Average minutes spent exercising at this level 30 min      (!) EXERCISE CONCERN      8/12/2024   Social Factors   Frequency of gathering with friends or relatives Twice a week   Worry food won't last until get money to buy more No   Food not last or not have enough money for food? No   Do you have housing? (Housing is defined as stable permanent housing and does not include staying ouside in a car, in a tent, in an abandoned building, in an overnight shelter, or couch-surfing.) Yes   Are you worried about losing your housing? No   Lack of transportation? Yes   Unable to  get utilities (heat,electricity)? No       (!) TRANSPORTATION CONCERN PRESENT      8/12/2024   Dental   Dentist two times every year? (!) NO            7/21/2024   TB Screening   Were you born outside of the [place]? No            Today's PHQ-9 Score:       7/21/2024    11:46 PM   PHQ-9 SCORE   PHQ-9 Total Score MyChart 10 (Moderate depression)   PHQ-9 Total Score 10           8/12/2024   Substance Use   Alcohol more than 3/day or more than 7/wk Yes   How often do you have a drink containing alcohol 2 to 3 times a week   How many alcohol drinks on typical day 5 or 6   How often do you have 5+ drinks at one occasion Weekly   Audit 2/3 Score 5   How often not able to stop drinking once started Monthly   How often failed to do what normally expected Never   How often needed first drink in am after a heavy drinking session Never   How often feeling of guilt or remorse after drinking Monthly   How often unable to remember what happened the night before Less than monthly   Have you or someone else been injured because of your drinking Yes, during the last year   Has anyone been concerned or suggested you cut down on drinking Yes, during the last year   TOTAL SCORE - AUDIT 21   Do you use any other substances recreationally? (!) CANNABIS PRODUCTS    (!) SYNTHETIC MARIJUANA       Multiple values from one day are sorted in reverse-chronological order     Social History     Tobacco Use    Smoking status: Never     Passive exposure: Current    Smokeless tobacco: Never    Tobacco comments:     Daily vaping now   Vaping Use    Vaping status: Every Day    Substances: Nicotine    Devices: Disposable, Pre-filled or refillable cartridge    Passive vaping exposure: Yes   Substance Use Topics    Alcohol use: Yes     Comment: once a month drinks 1-2    Drug use: Yes     Types: Marijuana             8/12/2024   One time HIV Screening   Previous HIV test? No          8/12/2024   STI Screening   New sexual partner(s) since last STI/HIV test?  "(!) YES          History of abnormal Pap smear: No - age 21-29 PAP every 3 years recommended             8/12/2024   Contraception/Family Planning   Questions about contraception or family planning No           Reviewed and updated as needed this visit by Provider         Joseph Hudson                  Review of Systems  Constitutional, HEENT, cardiovascular, pulmonary, gi and gu systems are negative, except as otherwise noted.     Objective    Exam  /75 (BP Location: Left arm, Patient Position: Sitting, Cuff Size: Adult Regular)   Pulse 103   Temp 99  F (37.2  C) (Oral)   Resp 14   Ht 1.575 m (5' 2\")   Wt 56.1 kg (123 lb 11.2 oz)   LMP 06/15/2024 (Approximate)   SpO2 95%   Breastfeeding No   BMI 22.63 kg/m     Estimated body mass index is 22.63 kg/m  as calculated from the following:    Height as of this encounter: 1.575 m (5' 2\").    Weight as of this encounter: 56.1 kg (123 lb 11.2 oz).    Physical Exam  GENERAL: alert and no distress  NECK: no adenopathy, no asymmetry, masses, or scars  RESP: lungs clear to auscultation - no rales, rhonchi or wheezes  CV: regular rate and rhythm, normal S1 S2, no S3 or S4, no murmur, click or rub, no peripheral edema  ABDOMEN: soft, nontender, no hepatosplenomegaly, no masses and bowel sounds normal  MS: no gross musculoskeletal defects noted, no edema        Signed Electronically by: LORENZO CLARK MD    "

## 2024-08-12 NOTE — TELEPHONE ENCOUNTER
1) [facility]) [id number] [place], [place] - [address] [place] [facility] [place] [address]     2) Any Controlled Substance(s)? No    3) Refill(s) requested for:     - venlafaxine (EFFEXOR XR) 150 MG 24 hr capsule  Date last ordered: 4/15/24 Qty: 90 Refills: 1   Pharmacy should have refill(s) on file    4) Action taken: refill request(s) sent back to pharmacy as DENIED.    BREANNA DIA RN on 8/12/2024 at 10:29 AM

## 2024-08-13 LAB
C TRACH DNA SPEC QL PROBE+SIG AMP: NEGATIVE
N GONORRHOEA DNA SPEC QL NAA+PROBE: NEGATIVE

## 2024-08-16 NOTE — PATIENT INSTRUCTIONS
Patient Education   Preventive Care Advice   This is general advice given by our system to help you stay healthy. However, your care team may have specific advice just for you. Please talk to your care team about your preventive care needs.  Nutrition  Eat 5 or more servings of fruits and vegetables each day.  Try wheat bread, brown rice and whole grain pasta (instead of white bread, rice, and pasta).  Get enough calcium and vitamin D. Check the label on foods and aim for 100% of the RDA (recommended daily allowance).  Lifestyle  Exercise at least 150 minutes each week  (30 minutes a day, 5 days a week).  Do muscle strengthening activities 2 days a week. These help control your weight and prevent disease.  No smoking.  Wear sunscreen to prevent skin cancer.  Have a dental exam and cleaning every 6 months.  Yearly exams  See your health care team every year to talk about:  Any changes in your health.  Any medicines your care team has prescribed.  Preventive care, family planning, and ways to prevent chronic diseases.  Shots (vaccines)   HPV shots (up to age 26), if you've never had them before.  Hepatitis B shots (up to age 59), if you've never had them before.  COVID-19 shot: Get this shot when it's due.  Flu shot: Get a flu shot every year.  Tetanus shot: Get a tetanus shot every 10 years.  Pneumococcal, hepatitis A, and RSV shots: Ask your care team if you need these based on your risk.  Shingles shot (for age 50 and up)  General health tests  Diabetes screening:  Starting at age 35, Get screened for diabetes at least every 3 years.  If you are younger than age 35, ask your care team if you should be screened for diabetes.  Cholesterol test: At age 39, start having a cholesterol test every 5 years, or more often if advised.  Bone density scan (DEXA): At age 50, ask your care team if you should have this scan for osteoporosis (brittle bones).  Hepatitis C: Get tested at least once in your life.  STIs (sexually  transmitted infections)  Before age 24: Ask your care team if you should be screened for STIs.  After age 24: Get screened for STIs if you're at risk. You are at risk for STIs (including HIV) if:  You are sexually active with more than one person.  You don't use condoms every time.  You or a partner was diagnosed with a sexually transmitted infection.  If you are at risk for HIV, ask about PrEP medicine to prevent HIV.  Get tested for HIV at least once in your life, whether you are at risk for HIV or not.  Cancer screening tests  Cervical cancer screening: If you have a cervix, begin getting regular cervical cancer screening tests starting at age 21.  Breast cancer scan (mammogram): If you've ever had breasts, begin having regular mammograms starting at age 40. This is a scan to check for breast cancer.  Colon cancer screening: It is important to start screening for colon cancer at age 45.  Have a colonoscopy test every 10 years (or more often if you're at risk) Or, ask your provider about stool tests like a FIT test every year or Cologuard test every 3 years.  To learn more about your testing options, visit:   .  For help making a decision, visit:   https://bit.ly/fn92430.  Prostate cancer screening test: If you have a prostate, ask your care team if a prostate cancer screening test (PSA) at age 55 is right for you.  Lung cancer screening: If you are a current or former smoker ages 50 to 80, ask your care team if ongoing lung cancer screenings are right for you.  For informational purposes only. Not to replace the advice of your health care provider. Copyright   2023 [facility]. All rights reserved. Clinically reviewed by the [facility]. [employer] [id number] - REV 01/24.  9 Ways to Cut Back on Drinking  Maybe you've found yourself drinking more alcohol than you'd prefer. If you want to cut back, here are some ideas to try.    Think before you drink.  Do you really want a drink,  "or is it just a habit? If you're used to having a drink at a certain time, try doing something else then.     Look for substitutes.  Find some no-alcohol drinks that you enjoy, like flavored seltzer water, tea with honey, or tonic with a slice of lime. Or try alcohol-free beer or \"virgin\" cocktails (without the alcohol).     Drink more water.  Use water to quench your thirst. Drink a glass of water before you have any alcohol. Have another glass along with every drink or between drinks.     Shrink your drink.  For example, have a bottle of beer instead of a pint. Use a smaller glass for wine. Choose drinks with lower alcohol content (ABV%). Or use less liquor and more mixer in cocktails.     Slow down.  It's easy to drink quickly and without thinking about it. Pay attention, and make each drink last longer.     Do the math.  Total up how much you spend on alcohol each month. How much is that a year? If you cut back, what could you do with the money you save?     Take a break.  Choose a day or two each week when you won't drink at all. Notice how you feel on those days, physically and emotionally. How did you sleep? Do you feel better? Over time, add more break days.     Count calories.  Would you like to lose some weight? For some people that's a good motivator for cutting back. Figure out how many calories are in each drink. How many does that add up to in a day? In a week? In a month?     Practice saying no.  Be ready when someone offers you a drink. Try: \"Thanks, I've had enough.\" Or \"Thanks, but I'm cutting back.\" Or \"No, thanks. I feel better when I drink less.\"   Current as of: November 15, 2023  Content Version: 14.1 2006-2024 [employer].   Care instructions adapted under license by your healthcare professional. If you have questions about a medical condition or this instruction, always ask your healthcare professional. [employer] disclaims any warranty or liability for your use " of this information.  Substance Use Disorder: Care Instructions  Overview     You can improve your life and health by stopping your use of alcohol or drugs. When you don't drink or use drugs, you may feel and sleep better. You may get along better with your family, friends, and coworkers. There are medicines and programs that can help with substance use disorder.  How can you care for yourself at home?  Here are some ways to help you stay sober and prevent relapse.  If you have been given medicine to help keep you sober or reduce your cravings, be sure to take it exactly as prescribed.  Talk to your doctor about programs that can help you stop using drugs or drinking alcohol.  Do not keep alcohol or drugs in your home.  Plan ahead. Think about what you'll say if other people ask you to drink or use drugs. Try not to spend time with people who drink or use drugs.  Use the time and money spent on drinking or drugs to do something that's important to you.  Preventing a relapse  Have a plan to deal with relapse. Learn to recognize changes in your thinking that lead you to drink or use drugs. Get help before you start to drink or use drugs again.  Try to stay away from situations, friends, or places that may lead you to drink or use drugs.  If you feel the need to drink alcohol or use drugs again, seek help right away. Call a trusted friend or family member. Some people get support from organizations such as Narcotics [employer] or [employer] or from treatment facilities.  If you relapse, get help as soon as you can. Some people make a plan with another person that outlines what they want that person to do for them if they relapse. The plan usually includes how to handle the relapse and who to notify in case of relapse.  Don't give up. Remember that a relapse doesn't mean that you have failed. Use the experience to learn the triggers that lead you to drink or use drugs. Then quit again. Recovery is a lifelong process.  "Many people have several relapses before they are able to quit for good.  Follow-up care is a key part of your treatment and safety. Be sure to make and go to all appointments, and call your doctor if you are having problems. It's also a good idea to know your test results and keep a list of the medicines you take.  When should you call for help?   Call 911  anytime you think you may need emergency care. For example, call if you or someone else:    Has overdosed or has withdrawal signs. Be sure to tell the emergency workers that you are or someone else is using or trying to quit using drugs. Overdose or withdrawal signs may include:  Losing consciousness.  Seizure.  Seeing or hearing things that aren't there (hallucinations).     Is thinking or talking about suicide or harming others.   Where to get help 24 hours a day, 7 days a week   If you or someone you know talks about suicide, self-harm, a mental health crisis, a substance use crisis, or any other kind of emotional distress, get help right away. You can:    Call the Suicide and Crisis Lifeline at 988.     Call 2-864-840-OXLA (1-875.372.2854).     Text HOME to 458844 to access the Crisis Text Line.   Consider saving these numbers in your phone.  Go to [employer] for more information or to chat online.  Call your doctor now or seek immediate medical care if:    You are having withdrawal symptoms. These may include nausea or vomiting, sweating, shakiness, and anxiety.   Watch closely for changes in your health, and be sure to contact your doctor if:    You have a relapse.     You need more help or support to stop.   Where can you learn more?  Go to https://www.[employer]Simple[employer].net/patiented  Enter H573 in the search box to learn more about \"Substance Use Disorder: Care Instructions.\"  Current as of: November 15, 2023               Content Version: 14.0    8794-7408 [employer].   Care instructions adapted under license by your healthcare professional. " If you have questions about a medical condition or this instruction, always ask your healthcare professional. [employer], [facility] disclaims any warranty or liability for your use of this information.      Safer Sex: Care Instructions  Overview  Safer sex is a way to reduce your risk of getting a sexually transmitted infection (STI). It can also help prevent pregnancy.  Several products can help you practice safer sex and reduce your chance of STIs. One of the best is a condom. There are internal and external condoms. You can use a special rubber sheet (dental dam) for protection during oral sex. Disposable gloves can keep your hands from touching blood, semen, or other body fluids that can carry infections.  Remember that birth control methods such as diaphragms, IUDs, foams, and birth control pills do not stop you from getting STIs.  Follow-up care is a key part of your treatment and safety. Be sure to make and go to all appointments, and call your doctor if you are having problems. It's also a good idea to know your test results and keep a list of the medicines you take.  How can you care for yourself at home?  Think about getting vaccinated to help prevent hepatitis A, hepatitis B, and human papillomavirus (HPV). They can be spread through sex.  Use a condom every time you have sex. Use an external condom, which goes on the penis. Or use an internal condom, which goes into the vagina or anus.  Make sure you use the right size external condom. A condom that's too small can break easily. A condom that's too big can slip off during sex.  Use a new condom each time you have sex. Be careful not to poke a hole in the condom when you open the wrapper.  Don't use an internal condom and an external condom at the same time.  Never use petroleum jelly (such as Vaseline), grease, hand lotion, baby oil, or anything with oil in it. These products can make holes in the condom.  After intercourse, hold the edge of the  "condom as you remove it. This will help keep semen from spilling out of the condom.  Do not have sex with anyone who has symptoms of an STI, such as sores on the genitals or mouth.  Do not drink a lot of alcohol or use drugs before sex.  Limit your sex partners. Sex with one partner who has sex only with you can reduce your risk of getting an STI.  Don't share sex toys. But if you do share them, use a condom and clean the sex toys between each use.  Talk to any partners before you have sex. Talk about what you feel comfortable with and whether you have any boundaries with sex. And find out if your partner or partners may be at risk for any STI. Keep in mind that a person may be able to spread an STI even if they do not have symptoms. You and any partners may want to get tested for STIs.  Where can you learn more?  Go to https://www.[employer].net/patiented  Enter B608 in the search box to learn more about \"Safer Sex: Care Instructions.\"  Current as of: November 27, 2023               Content Version: 14.0    5786-5175 [employer].   Care instructions adapted under license by your healthcare professional. If you have questions about a medical condition or this instruction, always ask your healthcare professional. [employer] disclaims any warranty or liability for your use of this information.         "

## 2024-08-30 ENCOUNTER — VIRTUAL VISIT (OUTPATIENT)
Dept: PSYCHIATRY | Facility: CLINIC | Age: 24
End: 2024-08-30
Payer: COMMERCIAL

## 2024-08-30 VITALS — WEIGHT: 123 LBS | HEIGHT: 62 IN | BODY MASS INDEX: 22.63 KG/M2

## 2024-08-30 DIAGNOSIS — F12.20 CANNABIS DEPENDENCE (H): ICD-10-CM

## 2024-08-30 DIAGNOSIS — F33.1 MAJOR DEPRESSIVE DISORDER, RECURRENT EPISODE, MODERATE (H): Primary | ICD-10-CM

## 2024-08-30 DIAGNOSIS — R63.0 ANOREXIA: ICD-10-CM

## 2024-08-30 DIAGNOSIS — F41.1 GAD (GENERALIZED ANXIETY DISORDER): ICD-10-CM

## 2024-08-30 PROCEDURE — 99214 OFFICE O/P EST MOD 30 MIN: CPT | Mod: 95 | Performed by: NURSE PRACTITIONER

## 2024-08-30 RX ORDER — PROPRANOLOL HYDROCHLORIDE 10 MG/1
10 TABLET ORAL 2 TIMES DAILY PRN
Qty: 60 TABLET | Refills: 1 | Status: SHIPPED | OUTPATIENT
Start: 2024-08-30

## 2024-08-30 ASSESSMENT — PATIENT HEALTH QUESTIONNAIRE - PHQ9
SUM OF ALL RESPONSES TO PHQ QUESTIONS 1-9: 6
10. IF YOU CHECKED OFF ANY PROBLEMS, HOW DIFFICULT HAVE THESE PROBLEMS MADE IT FOR YOU TO DO YOUR WORK, TAKE CARE OF THINGS AT HOME, OR GET ALONG WITH OTHER PEOPLE: SOMEWHAT DIFFICULT
SUM OF ALL RESPONSES TO PHQ QUESTIONS 1-9: 6

## 2024-08-30 ASSESSMENT — PAIN SCALES - GENERAL: PAINLEVEL: NO PAIN (0)

## 2024-08-30 NOTE — PROGRESS NOTES
"PSYCHIATRIC PROGRESS NOTE     Name:  Ashleigh Bustillo  : 2000    Ashleigh Bustillo is a 23 year old female who is being evaluated via a billable Video visit.      Telemedicine Visit: The patient's condition can be safely assessed and treated via synchronous audio and visual telemedicine encounter.      Reason for Telemedicine Visit: COVID 19 pandemic and the social and physical recommendations by the CDC and MD., Patient has requested telehealth visit, and Patient unable to travel      Originating Site (Patient Location): Patient's home    Distant Site (Provider Location): [facility] Outpatient Setting: [facility]    Consent:  The patient/guardian has verbally consented to: the potential risks and benefits of telemedicine (video visit or phone) versus in person care; bill my insurance or make self-payment for services provided; and responsibility for payment of non-covered services.     Mode of Communication:  Bebo platform     As the provider I attest to compliance with applicable laws and regulations related to telemedicine.    Date of Last Visit: 24                                          CHIEF COMPLAINT   \"I'm doing a lot better\"    HISTORY OF PRESENT ILLNESS     Patient who was last seen in the clinic on 24 returned today for follow up visit.  Patient reports she has noticed improvement in mood and depression since taking Abilify 5 mg, stating she is no longer experiencing feeling down or passive SI anymore.  Patient also reports that she has not engaged in self-injurious behavior since last visit.  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  Patient reports she is excited about going back to school again.  Patient states that school started already.  Patient does mention she has been having social anxiety since starting school due to not being in school for a very long time.  Patient reported she recently got a referral for addiction medicine " since she is still struggle with increased use of alcohol.  I suggested starting patient on propranolol 10 mg twice daily as needed for social anxiety.  I discussed medication risk, benefit, anxiety effects with the patient.  Patient verbalized good understanding and was amenable to taking propranolol to further help with anxiety.  Patient currently denies both suicidal and homicidal ideation.  She also denies both auditory and visual hallucination.  Patient report no marbella or hypomania.  Patient return to clinic in 6 weeks for follow-up.   PSYCHIATRIC HISTORY:   History of Psychiatric Hospitalizations:   - Inpatient: Once when she was 14 years old  - IOP/PHP/Day treatment: None  History of Suicidal Ideation: Positive   History of Suicide Attempts:Positive   History of Self-injurious Behavior: Denies a history of SIB.  Current:  No  History of Violence/Aggression: Negative  History of Commitment? Negative  Electroconvulsive Therapy (ECT):Negative    PSYCHIATRIC REVIEW OF SYSTEMS:   Psychiatric Review of Systems:   Depression:   Reports: depressed mood, suicidal ideation, decreased interest, changes in sleep, changes in appetite, guilt, hopelessness, helplessness, impaired concentration, decreased energy, irritability.  Marbella:   Denies: sleeplessness, increased goal-directed activities, abrupt increase in energy pressured speech  Psychosis:   Denies: visual hallucinations, auditory hallucinations, paranoia  Anxiety:   Reports: excessive worries that are difficult to control, panic attacks  PTSD:   Reports: re-experiencing past trauma, nightmares, increased arousal, avoidance of traumatic stimuli, impaired function.  Denies: re-experiencing past trauma, nightmares, increased arousal, avoidance of traumatic stimuli, impaired function.  OCD:   Denies: obsessions, checking, symmetry, cleaning, skin picking.  Eating Disorder:   Denies: restriction, binging, purging.    Sleep:       MEDICATIONS                               "                                                                  Current Outpatient Medications   Medication Sig Dispense Refill    ARIPiprazole (ABILIFY) 5 MG tablet Take 1 tablet (5 mg) by mouth daily 30 tablet 1    levonorgestrel-ethinyl estradiol (SEASONALE) 0.15-0.03 MG tablet Take 1 tablet by mouth daily 91 tablet 3    venlafaxine (EFFEXOR XR) 150 MG 24 hr capsule TAKE 1 CAPSULE(150 MG) BY MOUTH DAILY 90 capsule 1    venlafaxine (EFFEXOR XR) 75 MG 24 hr capsule TAKE 1 CAPSULE(75 MG) BY MOUTH DAILY IN ADDITION TO EFFEXOR  MG MAKING A. TOTAL DAILY DOSE  MG 30 capsule 1    VITAMIN D PO  (Patient not taking: Reported on 8/12/2024)       No current facility-administered medications for this visit.       DRUG MONITORING:  [place] Prescription Monitoring Program evaluating controlled substances in the last year in [place]:  The [place] Prescription Monitoring Program has been reviewed and there are no current concerns with: diversionary activity, early refill requests, and or obtaining the medication from multiple providers       PAST PSYCHOTROPIC MEDICATIONS:  Prozac, Effexor    VITALS   Ht 1.575 m (5' 2\")   Wt 55.8 kg (123 lb)   LMP 06/15/2024 (Approximate)   BMI 22.50 kg/m       BP Readings from Last 1 Encounters:   08/12/24 118/75     Pulse Readings from Last 1 Encounters:   08/12/24 103     Wt Readings from Last 1 Encounters:   08/30/24 55.8 kg (123 lb)     Ht Readings from Last 1 Encounters:   08/30/24 1.575 m (5' 2\")     Estimated body mass index is 22.5 kg/m  as calculated from the following:    Height as of this encounter: 1.575 m (5' 2\").    Weight as of this encounter: 55.8 kg (123 lb).      PERTINENT HISTORY   PAST MEDICAL HISTORY:   Past Medical History:   Diagnosis Date    Anorexia 03/2022    Anxiety     Depressive disorder     Mild episode of recurrent major depressive disorder (H24)     Suicide attempt (H)     at least once, hospitalized       PAST SURGICAL HISTORY: No past surgical " "history on file.    FAMILY HISTORY:   Family History   Problem Relation Age of Onset    Scoliosis Sister     Diabetes Maternal Grandmother        SOCIAL HISTORY:   Social History     Tobacco Use    Smoking status: Never     Passive exposure: Current    Smokeless tobacco: Never    Tobacco comments:     Daily vaping now   Substance Use Topics    Alcohol use: Yes     Comment: once a month drinks 1-2         Seizures or Head Injury: Denies history of head injury. Denies history of seizures.  History of cardiac disease, rheumatic fever, fainting or dizziness, especially with exercise, seizures, chest pain or shortness of breath with exercise, unexplained change in exercise tolerance, palpitations, high blood pressure, or heart murmur?   No    LABS & IMAGING                                                                                                                Personally reviewed  Recent Labs   Lab Test 04/08/22  1219   WBC 6.4   HGB 13.1   HCT 38.5   MCV 88        Recent Labs   Lab Test 04/08/22  1219      POTASSIUM 3.9   CHLORIDE 107   CO2 21*   GLC 77   SUE 9.0   BUN 8   CR 0.64   GFRESTIMATED >90   ALBUMIN 3.9   PROTTOTAL 7.5   AST 14   ALT 10   ALKPHOS 59   BILITOTAL 0.6     Recent Labs   Lab Test 04/08/22  1219   CHOL 124   LDL 46   HDL 63   TRIG 73     Recent Labs   Lab Test 08/12/24  1348   TSH 0.90     No results found for: \"ESE393\", \"YMYI548\", \"ZVTK52CRHJI\", \"VITD3\", \"D2VIT\", \"D3VIT\", \"DTOT\", \"[id number]\", \"[id number]\", \"[id number]\", \"[id number]\", \"[id number]\", \"[id number]\"     ALLERGY & IMMUNIZATIONS     No Known Allergies    FAMILY MEDICAL HISTORY:     Family History       Problem (# of Occurrences) Relation (Name,Age of Onset)    Diabetes (1) Maternal Grandmother (Danielle Sutherland)    Scoliosis (1) Sister              Family history of sudden or unexplained death or an event requiring resuscitation in children or young adults, cardiac arrhythmias (eg, Malika-Parkinson-White syndrome), long QT " syndrome, catecholaminergic paroxysmal ventricular tachycardia, Brugada syndrome, arrhythmogenic right ventricular dysplasia, hypertrophic cardiomyopathy, dilated cardiomyopathy, or Marfan syndrome?  No    FAMILY PSYCHIATRIC HISTORY:   Psychiatry:Denies  Substance use history in family: Brother and day struggled with drug abuse  Family suicide history: Denies      SIGNIFICANT SOCIAL/FAMILY HISTORY:                                           Born and raised in: [place]  Relationship status: Single, has a boyfriend  Children: None    Highest education level was:currently in college   Service:Denies   Employment status: Part time working at the [employer]  LEGAL:Denies     SUBSTANCE USE HISTORY    Tobacco use: Vapes occasionally  Caffeine: Denies  Current alcohol: 2-3 drinks once weekly  Current substance use:Marijuana  Past use alcohol/substance use:Marrijuanna      MEDICAL REVIEW OF SYSTEMS:   Ten system review was completed with pertinent positives noted above    MENTAL STATUS EXAM:   Mental Status Examination (limited due to video virtual visit format):  Vital Signs: There were no vitals taken for this virtual visit.  Appearance: adequately groomed, appears stated age, and in no apparent distress.  Attitude: cooperative   Eye Contact: good to the extent that can be determined in a video visit  Muscle Strength and Tone: no gross abnormalities based on remote observation  Psychomotor Behavior:  no evidence of tardive dyskinesia, dystonia, or tics based on remote observation  Gait and Station: normal, no gross abnormalities based on remote observation  Speech: clear, coherent, normal prosody, regular rate, regular rhythm and fluent  Associations: No loosening of associations  Thought Process: coherent and goal directed  Thought Content: no evidence of suicidal ideation or homicidal ideation, no evidence of psychotic thought, no auditory hallucinations present and no visual hallucinations  "present  Mood: \"good\"  Affect: appropriate and in normal range  Insight: good  Judgment: intact, adequate for safety  Impulse Control: intact  Oriented to: time, place, person and situation  Attention Span and Concentration: normal  Language: Intact  Recent and Remote Memory: intact to interview. Not formally assessed. No amnesia.  Fund of Knowledge: appropriate        SAFETY   Feels safe in home: Yes   Suicidal ideation: Denies  History of suicide attempts:  No   Hx of impulsivity: No     DSM 5 DIAGNOSIS:   1. Recurrent major depressive disorder, in partial remission (H)    2. Anxiety    ASSESSMENT AND PLAN    Patient is a 23 year old,   Not  or  female  with a history of generalized anxiety disorder, major depressive disorder, and eating disorder female  who presents for follow up visit.  She has quit her job at [employer].  Patient is currently doing dental assistant program at [employer]..  Today patient reports noticing social anxiety since starting a dental assistant program due to not being in school for a very long time.  I started patient on propranolol 10 mg twice daily as needed for increased anxiety.  Patient has been given referral for substance abuse disorder.  Patient plans to schedule intake appointment with recovery clinic.  Overall, mood, depression, and sleep have gotten a lot better.  She has not engaging self-injurious behavior since last visit.  She denies SI, SIB, and HI. RTC in 6 weeks    Plan:  1.Patient will take the medications as prescribed.   Medications: Continue Effexor 150 mg daily in addition to EFFEXOR 75 mg making a total daily dose of 225 mg for depression and anxiety  Continue Abilify 5 mg daily for mood, depression and anxiety  Take propranolol 10 mg twice daily as needed for anxiety  Patient will not stop taking medications or adjust them without consulting with the provider.  2.Patient will call with any problems between visits.  3.Patient will go to the emergency " room if not feeling safe , unable to function in the community, or if suicidal, homicidal or hearing voices or having paranoia.  4.Patient will abstain from drugs and alcohol./Pt denies use .  5.Patient will not drive if sedated on medications or under influence of any substance.  6.Patient will not mix psychiatric medications with drugs and alcohol.   7.Patient will watch his diet and exercise.  8.Patient will see non psychiatric providers for non psychiatric disorders.  9. Next appointment in 6 weeks    Risk Assessment:     Ashleigh has notable risk factors for self-harm, including, single status, anxiety and passive SI. However, risk is mitigated by ability to volunteer a safety plan and history of seeking help when needed. Additional steps taken to minimize risk include making medication adjustment, asking patient to call 911 and go to the ER if not able to stay safe at home,  Therefore, based on all available evidence including the factors cited above, Ashleigh does not appear to be an imminent danger to self or others and does not meet criteria 72 hour hold. However, if patient uses substances or is non-adherent with medication, their risk of decompensation and SI/HI will be elevated. This was discussed with the patient as she verbalized good understanding.   CONSULTS/REFERRALS:   Continue therapy  None at this time  Coordinate care with therapist as needed    MEDICAL:   None at this time  Coordinate care with PCP (Lauren Sen) as needed  Follow up with primary care provider as planned or for acute medical concerns.    PSYCHOEDUCATION:  Medication side effects and alternatives reviewed. Health promotion activities recommended and reviewed today. All questions addressed. Education and counseling completed regarding risks and benefits of medications and psychotherapy options.  Consent provided by patient/guardian  Call the psychiatric nurse line with medication questions or concerns at [phone].  [facility]  may be used to communicate with your provider, but this is not intended to be used for emergencies.  BLACK BOX WARNING: Discussed the Food and Drug Administration (FDA) requires that all antidepressants carry a warning that some children, adolescents and young adults may be at increased risk of suicide when taking antidepressants. Anyone taking an antidepressant should be watched closely for worsening depression or unusual behavior especially in the first few weeks after starting an SSRI. Keep in mind, antidepressants are more likely to reduce suicide risk in the long run by improving mood.   SEROTONIN SYNDROME:  Discussed risks of Serotonin syndrome (ie, serotonin toxicity) which is a potentially life-threatening condition associated with increased serotonergic activity in the central nervous system (CNS). It is seen with therapeutic medication use, inadvertent interactions between drugs, and intentional self-poisoning. Serotonin syndrome may involve a spectrum of clinical findings, which often include mental status changes, autonomic hyperactivity, and neuromuscular abnormalities.    BENZODIAZEPINE:  discussion on how benzos work and the need to use them short term due to potential of anxiety getting.  This is a controlled substance with risk for abuse, need to keep in a safe keep place and cannot replace lost scripts.    HYPNOTIC USE: Hypnotic use, risk for CNS depression, sleep-walking, not to mix with ETOH or other CNS depressant, need for six hours of sleep, stop if change in mood.  This is a controlled substance with risk for abuse, need to keep in a safe keep place and cannot replace lost scripts.  FIRST GENERATION ANTIPSYCHOTIC/ SECOND GENERATION ANTIPSYCHOTIC USE:  Atypical need for cardiometabolic monitoring with medication- B/P, weight, blood sugar, cholesterol.  Need to monitor for abnormal movements taught  SAFETY:  We all care about your loved one's safety. To reduce the risk of self-harm, remove  access to all:  Firearms, Medicines (both prescribed and over-the-counter), Knives and other sharp objects, Ropes and like materials, and Alcohol  SLEEP HYGIENE: establish a sleep routine, limit screen time 1 hour prior to bed, use bed for sleep only, take sleep/medications on time (including sleepy time tea, trazadone or herbal treatments such as melatonin), aroma therapy, limit caffeine/sugar, yoga, guided imagery, stretch, meditation, limit naps to 20 minutes, make a temperature change in the room, white noise, be mindful of slowing down breathing, take a warm bath/shower, frequently wash sheets, and journaling.   Medlineplus.gov is information for patients.  It is run by the [employer] and it contains information about all disorders, diseases and all medications.      COMMUNITY RESOURCES:    CRISIS NUMBERS: Provided in AVS 2023  National Suicide Prevention Lifeline: 2-264-779-TALK (046-011-4806)  AdviceIQ/resources for a list of additional resources (SOS)            [facility] - [phone]   Urgent Care Adult Mental [facility]-[phone] mobile unit/  crisis line  [facility] -[phone]   COPE  [facility] Mobile Team -[phone] (adults)/ [phone] (child)  Poison Control Center - 1-960.918.7672    OR  go to nearest ER  Crisis Text Line for any crisis  send this-   To: 313853   [facility]  [phone]  National Suicide Prevention Lifeline: 128.198.6266 (TTY: 677.597.9596). Call anytime for help.  (www.suicidepreventionlifeline.org)  National [place] on Mental Illness (www.[place].org): 565.112.1522 or 709-887-5753.   Mental Health Association (www.mentalhealth.org): 317.641.5202 or 566-031-8672.  [place] Crisis Text Line: Text [place] to 935153  Suicide LifeLine Chat: suicideFractal Analyticsline.org/chat    ADMINISTRATIVE BILLIN min spent interviewing patient, reviewing referral documents,  obtaining and reviewing outside records, communication with other health specialists, and preparing this report on this day: 8/30/24    Video/Phone Start Time:  0925  Video/Phone End Time:   0940    Greater than 50% of time was spent in counseling and coordination of care regarding above diagnoses and treatment plan.    Patient Status:  Our psychiatry providers act as a specialty service for Primary Care Providers in the [facility] that seek to optimize medications for unstable patients.  Once medications have been optimized, our providers discharge the patient back to the referring Primary Care Provider for ongoing medication management.  This type of system allows our providers to serve a high volume of patients. At this time  Patient will continue to be seen for ongoing consultation and stabilization.    Signed:   Jovan Manjarrez, MSN, APRN, PMHNP-BC  Long Term Outpatient Psychiatry  Chart documentation done in part with Dragon Voice Recognition software.  Although reviewed after completion, some word and grammatical errors may remain. Answers submitted by the patient for this visit:  Patient Health Questionnaire (Submitted on 7/24/2023)  If you checked off any problems, how difficult have these problems made it for you to do your work, take care of things at home, or get along with other people?: Very difficult  PHQ9 TOTAL SCORE: 14Answers submitted by the patient for this visit:  Patient Health Questionnaire (Submitted on 10/3/2023)  If you checked off any problems, how difficult have these problems made it for you to do your work, take care of things at home, or get along with other people?: Not difficult at all  PHQ9 TOTAL SCORE: 3    Answers submitted by the patient for this visit:  Patient Health Questionnaire (Submitted on 11/13/2023)  If you checked off any problems, how difficult have these problems made it for you to do your work, take care of things at home, or get along with other people?:  Somewhat difficult  PHQ9 TOTAL SCORE: 12    Answers submitted by the patient for this visit:  Patient Health Questionnaire (Submitted on 1/21/2024)  If you checked off any problems, how difficult have these problems made it for you to do your work, take care of things at home, or get along with other people?: Very difficult  PHQ9 TOTAL SCORE: 13  ASIA-7 (Submitted on 1/21/2024)  ASIA 7 TOTAL SCORE: 9    Answers submitted by the patient for this visit:  Patient Health Questionnaire (Submitted on 4/19/2024)  If you checked off any problems, how difficult have these problems made it for you to do your work, take care of things at home, or get along with other people?: Not difficult at all  PHQ9 TOTAL SCORE: 3    Answers submitted by the patient for this visit:  Patient Health Questionnaire (Submitted on 5/31/2024)  If you checked off any problems, how difficult have these problems made it for you to do your work, take care of things at home, or get along with other people?: Somewhat difficult  PHQ9 TOTAL SCORE: 7    Answers submitted by the patient for this visit:  Patient Health Questionnaire (Submitted on 7/12/2024)  If you checked off any problems, how difficult have these problems made it for you to do your work, take care of things at home, or get along with other people?: Very difficult  PHQ9 TOTAL SCORE: 11    Answers submitted by the patient for this visit:  Patient Health Questionnaire (Submitted on 8/30/2024)  If you checked off any problems, how difficult have these problems made it for you to do your work, take care of things at home, or get along with other people?: Somewhat difficult  PHQ9 TOTAL SCORE: 6

## 2024-08-30 NOTE — PATIENT INSTRUCTIONS
"Patient Education   The [facility]  What to Expect  Here's what to expect in the [facility] Psych[facility] Program.   About the program  You'll be meeting with a psychiatric doctor to check your mental health. A psychiatric doctor helps you deal with troubling thoughts and feelings by giving you medicine. They'll make sure you know the plan for your care. You may see them for a long time. When you're feeling better, they may refer you back to seeing your family doctor.   If you have any questions, we'll be glad to talk to you.  About visits  Be open  At your visits, please talk openly about your problems. It may feel hard, but it's the best way for us to help you.  Cancelling visits  If you can't come to your visit, please call us right away at [phone]. If you don't cancel at least 24 hours (1 full day) before your visit, that's \"late cancellation.\"  Not showing up for your visits  Being very late is the same as not showing up. You'll be a \"no show\" if:  You're more than 15 minutes late for a 30-minute (half hour) visit.  You're more than 30 minutes late for a 60-minute (full hour) visit.  If you cancel late or don't show up 2 times within 6 months, we may end your care.  Getting help between visits  If you need help between visits, you can call us Monday to Friday from 8 a.m. to 4:30 p.m. at [phone].  Emergency care  Call 911 or go to the nearest emergency department if your life or someone else's life is in danger.  Call 988 anytime to reach the national Suicide and Crisis hotline.  Medicine refills  To refill your medicine, call your pharmacy. You can also call [facility] at [phone], Monday to Friday, 8 a.m. to 4:30 p.m. It can take 1 to 3 business days to get a refill.   Forms, letters, and tests  You may have papers to fill out, like FMLA, short-term disability, and workability. We can help you with these forms at your visits, but you must have an " appointment. You may need more than 1 visit for this, to be in an intensive therapy program, or both.  Before we can give you medicine for ADHD, we may refer you to get tested for it or confirm it another way.  We may not be able to give you an emotional support animal letter.  We don't do mental health checks ordered by the court.   We don't do mental health testing, but we can refer you to get tested.   Thank you for choosing us for your care.  For informational purposes only. Not to replace the advice of your health care provider. Copyright   2022 [facility]. All rights reserved. [employer] [id number] - 12/22.     Plan:  1.Patient will take the medications as prescribed.   Medications: Continue Effexor 150 mg daily in addition to EFFEXOR 75 mg making a total daily dose of 225 mg for depression and anxiety  Continue Abilify 5 mg daily for mood, depression and anxiety  Take propranolol 10 mg twice daily as needed for anxiety  Patient will not stop taking medications or adjust them without consulting with the provider.  2.Patient will call with any problems between visits.  3.Patient will go to the emergency room if not feeling safe , unable to function in the community, or if suicidal, homicidal or hearing voices or having paranoia.  4.Patient will abstain from drugs and alcohol./Pt denies use .  5.Patient will not drive if sedated on medications or under influence of any substance.  6.Patient will not mix psychiatric medications with drugs and alcohol.   7.Patient will watch his diet and exercise.  8.Patient will see non psychiatric providers for non psychiatric disorders.  9. Next appointment in 6 weeks

## 2024-08-30 NOTE — NURSING NOTE
Current patient location: [address] [place] [address]    Is the patient currently in the state of [place]? YES    Visit mode:VIDEO    If the visit is dropped, the patient can be reconnected by: VIDEO VISIT: Text to cell phone:   Telephone Information:   Mobile [phone]       Will anyone else be joining the visit? NO  (If patient encounters technical issues they should call [phone]    How would you like to obtain your AVS? MyChart    Are changes needed to the allergy or medication list? No    Are refills needed on medications prescribed by this physician? NO    Rooming Documentation:  Questionnaire(s) completed      Reason for visit: CATIE DAVIDSONF

## 2024-09-21 DIAGNOSIS — F33.41 RECURRENT MAJOR DEPRESSIVE DISORDER, IN PARTIAL REMISSION (H): ICD-10-CM

## 2024-09-21 DIAGNOSIS — F33.1 MAJOR DEPRESSIVE DISORDER, RECURRENT EPISODE, MODERATE (H): ICD-10-CM

## 2024-09-21 DIAGNOSIS — F41.9 ANXIETY: ICD-10-CM

## 2024-09-23 RX ORDER — ARIPIPRAZOLE 5 MG/1
5 TABLET ORAL DAILY
Qty: 30 TABLET | Refills: 1 | OUTPATIENT
Start: 2024-09-23

## 2024-09-23 RX ORDER — VENLAFAXINE HYDROCHLORIDE 75 MG/1
CAPSULE, EXTENDED RELEASE ORAL
Qty: 30 CAPSULE | Refills: 1 | OUTPATIENT
Start: 2024-09-23

## 2024-09-23 NOTE — TELEPHONE ENCOUNTER
1) Reviewed refill request(s) from [id number] - [place], [place] - [address] AT [facility] [address]     2) Any Controlled Substance(s)? No    3) Refill(s) requested for:     - ARIPiprazole (ABILIFY) 5 MG tablet  Date last ordered: 7/12/24 Qty: 30 Refills: 1   Refill has been requested too soon  Dispense report indicates that Abilify was dispensed on 9/20/24 #30    - venlafaxine (EFFEXOR XR) 75 MG 24 hr capsule  Date last ordered: 8/8/24 Qty: 30 Refills: 1   Pharmacy should have refill(s) on file      4) Action taken: refill request(s) sent back to pharmacy as DENIED.    BREANNA DIA RN on 9/23/2024 at 9:32 AM

## 2024-10-05 DIAGNOSIS — F41.1 GAD (GENERALIZED ANXIETY DISORDER): ICD-10-CM

## 2024-10-05 DIAGNOSIS — Z30.011 ENCOUNTER FOR INITIAL PRESCRIPTION OF CONTRACEPTIVE PILLS: ICD-10-CM

## 2024-10-07 RX ORDER — LEVONORGESTREL AND ETHINYL ESTRADIOL 0.15-0.03
1 KIT ORAL DAILY
Qty: 91 TABLET | Refills: 3 | OUTPATIENT
Start: 2024-10-07

## 2024-10-07 RX ORDER — PROPRANOLOL HYDROCHLORIDE 10 MG/1
TABLET ORAL
Qty: 60 TABLET | Refills: 1 | OUTPATIENT
Start: 2024-10-07

## 2024-10-07 NOTE — TELEPHONE ENCOUNTER
Date of Last Office Visit: 8/30/24  Date of Next Office Visit:  10/14/24  No shows since last visit: No  More than one patient-initiated cancellation (with reschedule) since last seen in clinic? No    Medication(s) requested:     -  propranolol (INDERAL) 10 MG tablet   Date last ordered: 8/30/24  Qty: 60  Refills: 1  Appropriate for refill? No: refill has been requested too soon     Alanis Campos RN on 10/7/2024 at 8:45 AM

## 2024-12-05 ENCOUNTER — VIRTUAL VISIT (OUTPATIENT)
Dept: ADDICTION MEDICINE | Facility: CLINIC | Age: 24
End: 2024-12-05
Attending: FAMILY MEDICINE
Payer: COMMERCIAL

## 2024-12-05 DIAGNOSIS — F17.200 NICOTINE DEPENDENCE DUE TO VAPING NON-TOBACCO PRODUCT: ICD-10-CM

## 2024-12-05 DIAGNOSIS — F33.1 MAJOR DEPRESSIVE DISORDER, RECURRENT EPISODE, MODERATE (H): ICD-10-CM

## 2024-12-05 DIAGNOSIS — F10.20 ALCOHOL USE DISORDER, MODERATE, DEPENDENCE (H): Primary | ICD-10-CM

## 2024-12-05 DIAGNOSIS — F12.20 CANNABIS DEPENDENCE (H): ICD-10-CM

## 2024-12-05 RX ORDER — NALTREXONE HYDROCHLORIDE 50 MG/1
50 TABLET, FILM COATED ORAL DAILY
Qty: 30 TABLET | Refills: 1 | Status: SHIPPED | OUTPATIENT
Start: 2024-12-05

## 2024-12-05 RX ORDER — NICOTINE 21 MG/24HR
1 PATCH, TRANSDERMAL 24 HOURS TRANSDERMAL EVERY 24 HOURS
Qty: 30 PATCH | Refills: 1 | Status: SHIPPED | OUTPATIENT
Start: 2024-12-05

## 2024-12-05 ASSESSMENT — PATIENT HEALTH QUESTIONNAIRE - PHQ9
SUM OF ALL RESPONSES TO PHQ QUESTIONS 1-9: 16
10. IF YOU CHECKED OFF ANY PROBLEMS, HOW DIFFICULT HAVE THESE PROBLEMS MADE IT FOR YOU TO DO YOUR WORK, TAKE CARE OF THINGS AT HOME, OR GET ALONG WITH OTHER PEOPLE: VERY DIFFICULT
SUM OF ALL RESPONSES TO PHQ QUESTIONS 1-9: 16

## 2024-12-05 ASSESSMENT — PAIN SCALES - GENERAL: PAINLEVEL_OUTOF10: NO PAIN (0)

## 2024-12-05 NOTE — PROGRESS NOTES
"Virtual Visit Details    Type of service:  Video Visit     Originating Location (pt. Location): Home    Distant Location (provider location):  On-site  Platform used for Video Visit: Traci WOODS                                                      Ashleigh Bustillo is a 24 year old female who presents for  initial visit for addiction consultation and management referred by Self due to concerns for Alcohol Use Disorder (AUD) and cannabis and tobacco use    Visit performed Virtual, via video    HPI:   Ashleigh Bustillo is a 24 year old female with history of depression, cannabis, nicotine and alochol use who presents for further evaluation of possible substance use disorder and management options.    Struggling with alcohol, weed and vaping.  Recently has been constant usage and has been wanting to cut down out of concern for potential health consequences.  Ashleigh's substance use started at age 19 and started using on a regular basis two years ago.  Ashleigh has had thoughts of taking a break from using substances but has not been able to reduce or abstain.  Currently alcohol use is mixed vodka drinks 2-6 drinks every other day, she is using cannabis vaping and flower throughout the day, and nicotine use throughout the day.  Ashleigh reports that her dad struggles with alcohol use and her brother uses \"Harder drugs\" unable to identify.  She has not been to treatment or tried pharmacotherapy options in the past.  She would like to reduce/abstain from all substances.      Ashleigh lives with her parents, she is not working or going to school due to mental health concerns.  She is seen in psychiatry by Florentino Manjarrez CNP, has follow up scheduled Dec 10          Substance Use History:   \"Have you ever had any history with [...] use?\" And \"When was your last use?  ALCOHOL - every other day  CANNABIS - daily  PRESCRIPTION STIMULANTS (includes Ritalin, Adderall, Vyvanse) - denies  COCAINE/CRACK - denies  METH/AMPHETAMINES (includes " ecstacy, MDMA/patrice) - denies  OPIATES - denies  BENZODIAZEPINES (includes Ativan, Klonopin, Xanax) - denies  KRATOM (mild opioid and stimulant effects) - denies  KETAMINE - denies  HALLUCINOGENS (includes DXM) - experimental   BEHAVIORAL (Gambling, Eating d/o, Compulsivity) - anorexia   History of treatment - denies  NICOTINE  Cigarettes: none  Chew/snus: none  Vaping: current,   Past NRT/medication use: none       Previous withdrawal treatment episodes (e.g. detox): denies  Previous LARON treatment programs: denies  Hospitalizations or overdose: inpatient 14 years old  Medical complications from substance use: unknown  IV Drug use?: denies  Previous Medication for Addiction Tx: denies  Longest period of full abstinence:  none  Activities that have previously supported abstinence: none  Current Recovery Activities: none      Infectious disease screening  Hep C:    Hepatitis C Antibody   Date Value Ref Range Status   08/12/2024 Nonreactive Nonreactive Final     Comment:     A nonreactive screening test result does not exclude the possibility of exposure to or infection with HCV. Nonreactive screening test results in individuals with prior exposure to HCV may be due to antibody levels below the limit of detection of this assay or lack of reactivity to the HCV antigens used in this assay. Patients with recent HCV infections (<3 months from time of exposure) may have false-negative HCV antibody results due to the time needed for seroconversion (average of 8 to 9 weeks).       HIV:    HIV Antigen Antibody Combo   Date Value Ref Range Status   08/12/2024 Nonreactive Nonreactive Final     Comment:     Negative HIV-1 p24 antigen and HIV-1/2 antibody screening test results usually indicate the absence of HIV-1 and HIV-2 infection. However, such negative results do not rule-out acute HIV infection.  If acute HIV-1 or HIV-2 infection is suspected, detection of HIV-1 or HIV-2 RNA  is recommended.            Pregnancy Status  na(if no HcG in ED and patient is of childbearing years, please offer urine HcG)  LMP: current.    Sexually active: yes  Birth control/barriers: BCP     Psychiatric History (per patient report and problem list review)  Past diagnoses - depression,  Current or past psychiatrist: Josseline Prince  Current or past therapist:  none  Hospitalizations/TMS/ECT -   Suicide Attempts -   Medication trials -       PHQ-9 scores:      7/21/2024    11:46 PM 8/30/2024     8:02 AM 12/5/2024     7:38 AM   PHQ   PHQ-9 Total Score 10 6 16    Q9: Thoughts of better off dead/self-harm past 2 weeks Several days  Not at all  Several days    F/U: Thoughts of suicide or self-harm No   Yes    F/U: Self harm-plan   No    F/U: Self-harm action   No    F/U: Safety concerns No   No        Patient-reported     ASIA-7 scores:      2/1/2023    10:31 AM 3/17/2023     9:11 AM 1/21/2024     9:31 PM   ASIA-7 SCORE   Total Score 8 (mild anxiety) 5 (mild anxiety) 9 (mild anxiety)   Total Score 8 5 9         SOCIAL HISTORY:  Housing status: with parents  Employment status: Unemployed, not seeking work  Relationship status: Single  Children: no  Legal concerns related to use:   Contact information up to date?     [employer]  (please obtain ALEXIA if pt would like to include)      Medical History:    Patient Active Problem List    Diagnosis Date Noted    Cannabis dependence (H) 02/19/2024     Priority: Medium    Major depressive disorder, recurrent episode, moderate (H) 02/19/2024     Priority: Medium    History of suicide attempt 03/16/2023     Priority: Medium    Anxiety 12/27/2022     Priority: Medium    Recurrent major depressive disorder, in partial remission (H) 12/27/2022     Priority: Medium    Anorexia 04/08/2022     Priority: Medium       Past Medical History:   Diagnosis Date    Anorexia 03/2022    Anxiety     Depressive disorder     Mild episode of recurrent major depressive disorder (H)     Suicide attempt (H)     at least once, hospitalized  "      No past surgical history on file.      Family History   Problem Relation Age of Onset    Scoliosis Sister     Diabetes Maternal Grandmother          Current Outpatient Medications   Medication Sig Dispense Refill    ARIPiprazole (ABILIFY) 5 MG tablet Take 1 tablet (5 mg) by mouth daily 30 tablet 1    levonorgestrel-ethinyl estradiol (SEASONALE) 0.15-0.03 MG tablet Take 1 tablet by mouth daily 91 tablet 3    propranolol (INDERAL) 10 MG tablet Take 1 tablet (10 mg) by mouth 2 times daily as needed (Anxiety). 60 tablet 1    venlafaxine (EFFEXOR XR) 150 MG 24 hr capsule TAKE 1 CAPSULE(150 MG) BY MOUTH DAILY 90 capsule 1    venlafaxine (EFFEXOR XR) 75 MG 24 hr capsule TAKE 1 CAPSULE(75 MG) BY MOUTH DAILY IN ADDITION TO EFFEXOR  MG MAKING A. TOTAL DAILY DOSE  MG 30 capsule 1    VITAMIN D PO  (Patient not taking: Reported on 8/12/2024)           No Known Allergies        OBJECTIVE                                                      EXAM    There were no vitals taken for this visit.    GENERAL: healthy, alert and no distress  EYES: Eyes grossly normal to inspection, PERRL and conjunctivae and sclerae normal  RESP: No respiratory distress  MENTAL STATUS EXAM  Appearance/Behavior: No appearant distress  Speech: Normal  Mood/Affect: normal affect  Insight: Fair      LAB  Recent UDS Labs (may not contain today's lab data)  No results found for: \"BUP\", \"BZO\", \"BAR\", \"RAE\", \"MAMP\", \"AMP\", \"MDMA\", \"MTD\", \"WSG752\", \"OXY\", \"PCP\", \"THC\", \"TEMP\", \"SGPOCT\"        Hepatic Function  AST   Date Value Ref Range Status   04/08/2022 14 0 - 40 U/L Final     ALT   Date Value Ref Range Status   04/08/2022 10 0 - 45 U/L Final     Bilirubin Total   Date Value Ref Range Status   04/08/2022 0.6 0.0 - 1.0 mg/dL Final     Albumin   Date Value Ref Range Status   04/08/2022 3.9 3.5 - 5.0 g/dL Final       CBC  WBC Count   Date Value Ref Range Status   04/08/2022 6.4 4.0 - 11.0 10e3/uL Final     RBC Count   Date Value Ref Range Status "   04/08/2022 4.36 3.80 - 5.20 10e6/uL Final     Hemoglobin   Date Value Ref Range Status   04/08/2022 13.1 11.7 - 15.7 g/dL Final     Hematocrit   Date Value Ref Range Status   04/08/2022 38.5 35.0 - 47.0 % Final     MCV   Date Value Ref Range Status   04/08/2022 88 78 - 100 fL Final     MCH   Date Value Ref Range Status   04/08/2022 30.0 26.5 - 33.0 pg Final     MCHC   Date Value Ref Range Status   04/08/2022 34.0 31.5 - 36.5 g/dL Final     Platelet Count   Date Value Ref Range Status   04/08/2022 237 150 - 450 10e3/uL Final     RDW   Date Value Ref Range Status   04/08/2022 12.0 10.0 - 15.0 % Final       Today's lab data  No results found for any visits on 12/05/24.        [facility] Data Base Reviewed;    Consistent with patient reports and Epic records.           A/P                                                      ASSESSMENT/PLAN:  1. Alcohol use disorder, moderate, dependence (H) (Primary)  -needs improvement  - naltrexone (DEPADE/REVIA) 50 MG tablet; Take 1 tablet (50 mg) by mouth daily. Take 1/2 tablet daily for six days, then increase to one full tablet  Dispense: 30 tablet; Refill: 1  - Adult Mental Health  Referral; Future  -Call [phone] to schedule a LARON assessment and follow all recommendation  -follow up Dec 26, 24        2. Cannabis dependence (H)  -needs improvement.   - Adult Mental Health  Referral  - naltrexone (DEPADE/REVIA) 50 MG tablet; Take 1 tablet (50 mg) by mouth daily. Take 1/2 tablet daily for six days, then increase to one full tablet  Dispense: 30 tablet; Refill: 1  - Adult Mental Health  Referral; Future  NAC  I recommend using N-acetylcystine (aka NAC). This is a supplement that can be purchased at any [employer], [employer], or . If it works, it will help reduce cravings to use cannabis     The lowest recommended dose is 600mg twice daily. I suggest you start with this dose and see if you notice any effects, positive  or otherwise. There is not a clear side-effect profile to warn you about. Please talk with a pharmacist where you  your medications to ask about any further side-effect concerns.           3. Major depressive disorder, recurrent episode, moderate (H)  -needs improvement  -continue follow up with psychiatry Suresh Manjarrez and follow all recommendaitons  - Adult Mental Health  Referral  - Adult Mental Health  Referral; Future    4. Nicotine dependence due to vaping non-tobacco product  -needs improvement   - nicotine (NICODERM CQ) 14 MG/24HR 24 hr patch; Place 1 patch over 24 hours onto the skin every 24 hours.  Dispense: 30 patch; Refill: 1  - nicotine (NICORETTE) 2 MG gum; Place 1 each (2 mg) inside cheek as needed for nicotine withdrawal symptoms.  Dispense: 110 each; Refill: 2    ENCOUNTER FOR LONG TERM USE OF HIGH RISK MEDICATION   High Risk Drug Monitoring?  YES   Drug being monitored: naltrexone, NAC,    Reason for drug: alcohol, cannabis Use Disorder   What is being monitored?: Dosage, Cravings, Triggers and side effects        Continued Complex Management  The longitudinal plan of care for Alcohol Use Disorder (AUD) and cannabis use was addressed during this visit. Due to the added complexity in care, I will continue to support Ashleigh in the subsequent management and with ongoing continuity of care.      Counseled the patient on the importance of having a recovery program in addition to medication to manage recovery.  Components include avoiding isolating, having willingness to change, avoiding triggers and managing cravings. Encouraged having some type of sober network and practicing honesty with trusted support person(s). Encouraged other services such as counseling, 12 step or other self-help organizations.          RTC   N-acetylcystine  Nicoderm patch  Naltrexone  LARON assessment           Eliana Benito NP  [facility]  [phone]    Answers  submitted by the patient for this visit:  Patient Health Questionnaire (Submitted on 12/5/2024)  If you checked off any problems, how difficult have these problems made it for you to do your work, take care of things at home, or get along with other people?: Very difficult  PHQ9 TOTAL SCORE: 16      Time statement  The total TIME spent on this patient on the day of the appointment was 65 minutes.  This includes time spent preparing to see the patient, reviewing history, ordering/reviewing tests, medications, communicating with and referring to other health care professionals when indicated, and documenting clinical information in Epic

## 2024-12-05 NOTE — NURSING NOTE
Is the patient currently in the state of [place]? YES    Current patient location: [address] [place] [id number]    Visit mode:VIDEO    If the visit is dropped, the patient can be reconnected by: VIDEO VISIT: Text to cell phone:   Telephone Information:   Mobile [phone]       Will anyone else be joining the visit? No  (If patient encounters technical issues they should call [phone])    Are changes needed to the allergy or medication list? Pt stated no changes to allergies and Pt stated no med changes    Are refills needed on medications prescribed by this physician? No    Rooming Documentation: Questionnaire(s) completed.    Reason for visit: Consult     UVALDO Escobar

## 2024-12-05 NOTE — PATIENT INSTRUCTIONS
NAC  I recommend using N-acetylcystine (aka NAC). This is a supplement that can be purchased at any [employer], [employer], or . If it works, it will help reduce cravings to use cannabis     The lowest recommended dose is 600mg twice daily. I suggest you start with this dose and see if you notice any effects, positive or otherwise. There is not a clear side-effect profile to warn you about. Please talk with a pharmacist where you  your medications to ask about any further side-effect concerns.     If you see results, we can stay at this dose. If not, we can increase the dose. The maximum recommended is 2400mg daily.          Patient Education   Addiction Medicine  What to Expect  Here's what to expect from our Addiction Medicine program.  About Addiction Medicine  Addiction Medicine clinics help you with substance use problems. You set your own goals. We try to help you reach your goals. Your care plan can include:  Medicine  Creating a recovery plan  Helping you find local resources  Helping with treatment options  Clinic phone number and addresses  Clinic Phone: [phone]  M[facility] and Addiction Clinic  [facility]  [address] , [address]  [place] [address]  [facility]  [address], [address]  [place] [address]  Walk-in services  We offer walk-in care for patients at the Recovery [facility]. This is only for patients with Opioid Use Disorder (OUD). Anyone with OUD is welcome. Our providers will refer you to the Recovery Clinic if you're struggling to keep up with your medicines or appointments.  [facility] ([facility])  [address], [address]  [place] [address]  Phone: [phone]  The [facility] Clinic is open for walk-ins Monday to Friday 9 a.m. to 11:30 a.m. and 12:30 p.m. to 3 p.m.  How it works  Come to your visits every time. The treatment works better when you do.   You can have as many visits  "as you need. When you're better, we'll refer you back to being cared for by your family doctor.   If you need it, we'll send you to doctors, psychiatrists, therapists, and other providers. We focus on treating addiction. We don't treat other problems, like managing other medicines or non-addiction issues.  About visits  Urine drug testing  We'll often test your pee (urine) for drugs. This is the only way we can know for sure whether or not you're using drugs. It helps us treat you without judgement.   Suboxone (buprenorphine)  If you're taking buprenorphine, you'll have a lot of visits at first. If your problem is getting worse, or you're using substances, we may schedule you for extra visits.   Cancelling visits  If you can't come to your visit, please call us right away at [phone]. If you don't cancel at least 24 hours (1 full day) before your visit, that's \"late cancellation.\"  Being late to visits  If you come late, you may not be seen. This will count as a \"no-show.\"  Please call the clinic if you're running late. This will help us plan, but it doesn't mean you'll be seen.   Being late is:  More than 15 minutes late for a return visit.  More than 30 minutes late for your first visit.  If you cancel late or don't show up 2 times within 6 months, we may transfer you to another clinic.   Getting help between visits  If you need help between visits, you can call us Monday to Friday from 8 a.m. to 4:30 p.m. at [phone]. You can also send us a message on [employer].  Medicine refills  If you miss or cancel a visit, you can still ask for a refill. But we can only refill your medicines if you've made a new appointment.  Please call your pharmacy for medicine refills. If you have a question about your refill, call us at [phone].  It takes up to 2 business days to refill your drugs. Let us know 2 to 3 days before you run out. Don't call more than 1 week before you run out. That's too early.   Please make " sure we have your right phone number.  If we have a problem with your refill, we'll call you. If we call you, please call us back right away. If you don't, you may not get your medicines quickly.   Call your pharmacy to find out if your medicines are ready.   Keep your medicines in a safe place. Keep them away from pets and children. If your medicines are lost or stolen, we usually don't replace them. We recommend you file a police report if your medicines are stolen. Your insurance may not pay for early refills, even if you have a prescription.  Forms  Please give us at least 3 business days to fill out any forms. Bring the forms to your visits if you can. We may refer you to other members of your care team to complete the forms.   Emergency care   Call 911 or go to the nearest emergency room if your life or someone else's life is in danger.  Call 988 anytime for the Suicide and Crisis Lifeline.  If you need care when we're closed, call your family doctor to see if they can help. You can also go to urgent care or an emergency room. [facility] emergency rooms may be able to give you buprenorphine or other medicine refills.  Thank you for choosing us for your care.  For informational purposes only. Not to replace the advice of your health care provider. Copyright   2023 [facility]. All rights reserved. [employer] [id number] - REV 05/23.

## 2024-12-10 ENCOUNTER — VIRTUAL VISIT (OUTPATIENT)
Dept: PSYCHIATRY | Facility: CLINIC | Age: 24
End: 2024-12-10
Payer: COMMERCIAL

## 2024-12-10 DIAGNOSIS — F10.20 UNCOMPLICATED ALCOHOL DEPENDENCE (H): ICD-10-CM

## 2024-12-10 DIAGNOSIS — F41.9 ANXIETY: ICD-10-CM

## 2024-12-10 DIAGNOSIS — F12.20 CANNABIS DEPENDENCE (H): ICD-10-CM

## 2024-12-10 DIAGNOSIS — F33.41 RECURRENT MAJOR DEPRESSIVE DISORDER, IN PARTIAL REMISSION (H): Primary | ICD-10-CM

## 2024-12-10 ASSESSMENT — PATIENT HEALTH QUESTIONNAIRE - PHQ9
SUM OF ALL RESPONSES TO PHQ QUESTIONS 1-9: 15
10. IF YOU CHECKED OFF ANY PROBLEMS, HOW DIFFICULT HAVE THESE PROBLEMS MADE IT FOR YOU TO DO YOUR WORK, TAKE CARE OF THINGS AT HOME, OR GET ALONG WITH OTHER PEOPLE: VERY DIFFICULT
SUM OF ALL RESPONSES TO PHQ QUESTIONS 1-9: 15

## 2024-12-10 ASSESSMENT — PAIN SCALES - GENERAL: PAINLEVEL_OUTOF10: NO PAIN (0)

## 2024-12-10 NOTE — PROGRESS NOTES
"PSYCHIATRIC PROGRESS NOTE     Name:  Ashleigh Bustillo  : 2000    Ashleigh Bustillo is a 23 year old female who is being evaluated via a billable Video visit.      Telemedicine Visit: The patient's condition can be safely assessed and treated via synchronous audio and visual telemedicine encounter.      Reason for Telemedicine Visit: COVID 19 pandemic and the social and physical recommendations by the CDC and MD., Patient has requested telehealth visit, and Patient unable to travel      Originating Site (Patient Location): Patient's home    Distant Site (Provider Location): [facility] Outpatient Setting: [facility]    Consent:  The patient/guardian has verbally consented to: the potential risks and benefits of telemedicine (video visit or phone) versus in person care; bill my insurance or make self-payment for services provided; and responsibility for payment of non-covered services.     Mode of Communication:  [employer] platform     As the provider I attest to compliance with applicable laws and regulations related to telemedicine.    Date of Last Visit: 24                                          CHIEF COMPLAINT   \"I'm not doing well\"    HISTORY OF PRESENT ILLNESS     Patient who was last seen in the clinic on 24 returned today for follow up visit.  Patient reports she has been strugglincang with depression in the form of feeling down, increased sadness, feeling overwhelmed in addition to passive suicidal ideation lately.  Patient stated she quit her two jobs and drove back from school due to worsening depression.  Patient does mention that she has been engaging in heavy alcohol use lately as she drinks about 1 L of vodka every 2 days.  Patient stated she met with the addiction medicine provider yesterday who started her on naltrexone and nicotine patch.  I spent extensive period of time educating patient about how mood and mind harming substances like alcohol and marijuana can make symptoms " worse.  I reminded patient that alcohol is a depressant that can cause worsening depression.  Patient does mention she is not sure if she can start staying sober at this time due to lots of temptations.  I encouraged patient to stay sober while continue to see  addiction medicine doctor to help with his sobriety.  Patient is not open to medication changes at this time as she would like to try to be sober first so her baseline depression  be effectively assessed.  Patient endorsed passive SI but denies plan or intent.   She also denies both auditory and visual hallucination.  Patient report no marbella or hypomania.  Patient return to clinic in 6 weeks for follow-up.   PSYCHIATRIC HISTORY:   History of Psychiatric Hospitalizations:   - Inpatient: Once when she was 14 years old  - IOP/PHP/Day treatment: None  History of Suicidal Ideation: Positive   History of Suicide Attempts:Positive   History of Self-injurious Behavior: Denies a history of SIB.  Current:  No  History of Violence/Aggression: Negative  History of Commitment? Negative  Electroconvulsive Therapy (ECT):Negative    PSYCHIATRIC REVIEW OF SYSTEMS:   Psychiatric Review of Systems:   Depression:   Reports: depressed mood, suicidal ideation, decreased interest, changes in sleep, changes in appetite, guilt, hopelessness, helplessness, impaired concentration, decreased energy, irritability.  Marbella:   Denies: sleeplessness, increased goal-directed activities, abrupt increase in energy pressured speech  Psychosis:   Denies: visual hallucinations, auditory hallucinations, paranoia  Anxiety:   Reports: excessive worries that are difficult to control, panic attacks  PTSD:   Reports: re-experiencing past trauma, nightmares, increased arousal, avoidance of traumatic stimuli, impaired function.  Denies: re-experiencing past trauma, nightmares, increased arousal, avoidance of traumatic stimuli, impaired function.  OCD:   Denies: obsessions, checking, symmetry, cleaning,  skin picking.  Eating Disorder:   Denies: restriction, binging, purging.    Sleep:       MEDICATIONS                                                                                              One   Current Outpatient Medications   Medication Sig Dispense Refill    ARIPiprazole (ABILIFY) 5 MG tablet Take 1 tablet (5 mg) by mouth daily 30 tablet 1    levonorgestrel-ethinyl estradiol (SEASONALE) 0.15-0.03 MG tablet Take 1 tablet by mouth daily 91 tablet 3    naltrexone (DEPADE/REVIA) 50 MG tablet Take 1 tablet (50 mg) by mouth daily. Take 1/2 tablet daily for six days, then increase to one full tablet 30 tablet 1    nicotine (NICODERM CQ) 14 MG/24HR 24 hr patch Place 1 patch over 24 hours onto the skin every 24 hours. 30 patch 1    nicotine (NICORETTE) 2 MG gum Place 1 each (2 mg) inside cheek as needed for nicotine withdrawal symptoms. 110 each 2    propranolol (INDERAL) 10 MG tablet Take 1 tablet (10 mg) by mouth 2 times daily as needed (Anxiety). 60 tablet 1    venlafaxine (EFFEXOR XR) 150 MG 24 hr capsule TAKE 1 CAPSULE(150 MG) BY MOUTH DAILY 90 capsule 1    venlafaxine (EFFEXOR XR) 75 MG 24 hr capsule TAKE 1 CAPSULE(75 MG) BY MOUTH DAILY IN ADDITION TO EFFEXOR  MG MAKING A. TOTAL DAILY DOSE  MG 30 capsule 1    VITAMIN D PO  (Patient not taking: Reported on 12/10/2024)       No current facility-administered medications for this visit.       DRUG MONITORING:  [place] Prescription Monitoring Program evaluating controlled substances in the last year in [place]:  The [place] Prescription Monitoring Program has been reviewed and there are no current concerns with: diversionary activity, early refill requests, and or obtaining the medication from multiple providers       PAST PSYCHOTROPIC MEDICATIONS:  Prozac, Effexor    VITALS   There were no vitals taken for this visit.     BP Readings from Last 1 Encounters:   08/12/24 118/75     Pulse Readings from Last 1 Encounters:   08/12/24 103     Wt Readings from  "Last 1 Encounters:   08/30/24 55.8 kg (123 lb)     Ht Readings from Last 1 Encounters:   08/30/24 1.575 m (5' 2\")     Estimated body mass index is 22.5 kg/m  as calculated from the following:    Height as of 8/30/24: 1.575 m (5' 2\").    Weight as of 8/30/24: 55.8 kg (123 lb).      PERTINENT HISTORY   PAST MEDICAL HISTORY:   Past Medical History:   Diagnosis Date    Anorexia 03/2022    Anxiety     Depressive disorder     Mild episode of recurrent major depressive disorder (H)     Suicide attempt (H)     at least once, hospitalized       PAST SURGICAL HISTORY: No past surgical history on file.    FAMILY HISTORY:   Family History   Problem Relation Age of Onset    Scoliosis Sister     Diabetes Maternal Grandmother        SOCIAL HISTORY:   Social History     Tobacco Use    Smoking status: Never     Passive exposure: Current    Smokeless tobacco: Never    Tobacco comments:     Daily vaping now   Substance Use Topics    Alcohol use: Yes     Comment: once a month drinks 1-2         Seizures or Head Injury: Denies history of head injury. Denies history of seizures.  History of cardiac disease, rheumatic fever, fainting or dizziness, especially with exercise, seizures, chest pain or shortness of breath with exercise, unexplained change in exercise tolerance, palpitations, high blood pressure, or heart murmur?   No    LABS & IMAGING                                                                                                                Personally reviewed  Recent Labs   Lab Test 04/08/22  1219   WBC 6.4   HGB 13.1   HCT 38.5   MCV 88        Recent Labs   Lab Test 04/08/22  1219      POTASSIUM 3.9   CHLORIDE 107   CO2 21*   GLC 77   SUE 9.0   BUN 8   CR 0.64   GFRESTIMATED >90   ALBUMIN 3.9   PROTTOTAL 7.5   AST 14   ALT 10   ALKPHOS 59   BILITOTAL 0.6     Recent Labs   Lab Test 04/08/22  1219   CHOL 124   LDL 46   HDL 63   TRIG 73     Recent Labs   Lab Test 08/12/24  1348   TSH 0.90     No results found " "for: \"IFW347\", \"WFES668\", \"ISKV07AICXT\", \"VITD3\", \"D2VIT\", \"D3VIT\", \"DTOT\", \"[id number]\", \"[id number]\", \"[id number]\", \"[id number]\", \"[id number]\", \"[id number]\"     ALLERGY & IMMUNIZATIONS     No Known Allergies    FAMILY MEDICAL HISTORY:     Family History       Problem (# of Occurrences) Relation (Name,Age of Onset)    Diabetes (1) Maternal Grandmother (Danielle Sutherland)    Scoliosis (1) Sister              Family history of sudden or unexplained death or an event requiring resuscitation in children or young adults, cardiac arrhythmias (eg, Malika-Parkinson-White syndrome), long QT syndrome, catecholaminergic paroxysmal ventricular tachycardia, Brugada syndrome, arrhythmogenic right ventricular dysplasia, hypertrophic cardiomyopathy, dilated cardiomyopathy, or Marfan syndrome?  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  Appearance: adequately groomed, appears stated age, and in no apparent distress.  Attitude: cooperative   Eye Contact: good to the extent that can be determined in a video visit  Muscle Strength and Tone: no gross " "abnormalities based on remote observation  Psychomotor Behavior:  no evidence of tardive dyskinesia, dystonia, or tics based on remote observation  Gait and Station: normal, no gross abnormalities based on remote observation  Speech: clear, coherent, normal prosody, regular rate, regular rhythm and fluent  Associations: No loosening of associations  Thought Process: coherent and goal directed  Thought Content: no evidence of suicidal ideation or homicidal ideation, no evidence of psychotic thought, no auditory hallucinations present and no visual hallucinations present  Mood: \"good\"  Affect: appropriate and in normal range  Insight: good  Judgment: intact, adequate for safety  Impulse Control: intact  Oriented to: time, place, person and situation  Attention Span and Concentration: normal  Language: Intact  Recent and Remote Memory: intact to interview. Not formally assessed. No amnesia.  Fund of Knowledge: appropriate        SAFETY   Feels safe in home: Yes   Suicidal ideation: Denies  History of suicide attempts:  No   Hx of impulsivity: No     DSM 5 DIAGNOSIS:   1. Recurrent major depressive disorder, in partial remission (H)    2. Anxiety    ASSESSMENT AND PLAN    Patient is a 23 year old,   Not  or  female  with a history of generalized anxiety disorder, major depressive disorder, and eating disorder female  who presents for follow up visit.  She has quit her two jobs and dropped out from school due to worsening depression.  She has been engaging in maladaptive coping by drinking about 1 L of vodka every 2 days while also engaging in marijuana use.  She is currently being seen at the addiction clinic for craving  medication while waiting for substance abuse disorder intake.  Patient and I agreed this is not a good time to make any changes to her medication as she plans to try to be sober to effectively assess her baseline depression before making any changes.  I educated patient about how " mood and mind altering substance like alcohol and marijuana can make symptoms worse.  She endorsed passive SI but denies plan or intent.  She also plans to find a therapist that she is comfortable with to started therapy session in addition to the medication.  She denies SI, SIB, and HI. RTC in 6 weeks    Plan:  1.Patient will take the medications as prescribed.   Medications: Continue Effexor 150 mg daily in addition to EFFEXOR 75 mg making a total daily dose of 225 mg for depression and anxiety  Continue Abilify 5 mg daily for mood, depression and anxiety  Continue propranolol 10 mg twice daily as needed for anxiety  Patient will not stop taking medications or adjust them without consulting with the provider.  2.Patient will call with any problems between visits.  3.Patient will go to the emergency room if not feeling safe , unable to function in the community, or if suicidal, homicidal or hearing voices or having paranoia.  4.Patient will abstain from drugs and alcohol./Pt denies use .  5.Patient will not drive if sedated on medications or under influence of any substance.  6.Patient will not mix psychiatric medications with drugs and alcohol.   7.Patient will watch his diet and exercise.  8.Patient will see non psychiatric providers for non psychiatric disorders.  9. Next appointment in 6 weeks    Risk Assessment:     Ashleigh has notable risk factors for self-harm, including, single status, anxiety and passive SI. However, risk is mitigated by ability to volunteer a safety plan and history of seeking help when needed. Additional steps taken to minimize risk include making medication adjustment, asking patient to call 911 and go to the ER if not able to stay safe at home,  Therefore, based on all available evidence including the factors cited above, Ashleigh does not appear to be an imminent danger to self or others and does not meet criteria 72 hour hold. However, if patient uses substances or is non-adherent with  medication, their risk of decompensation and SI/HI will be elevated. This was discussed with the patient as she verbalized good understanding.   CONSULTS/REFERRALS:   Continue therapy  None at this time  Coordinate care with therapist as needed    MEDICAL:   None at this time  Coordinate care with PCP (Lauren Sen) as needed  Follow up with primary care provider as planned or for acute medical concerns.    PSYCHOEDUCATION:  Medication side effects and alternatives reviewed. Health promotion activities recommended and reviewed today. All questions addressed. Education and counseling completed regarding risks and benefits of medications and psychotherapy options.  Consent provided by patient/guardian  Call the psychiatric nurse line with medication questions or concerns at [phone].  [employer] may be used to communicate with your provider, but this is not intended to be used for emergencies.  BLACK BOX WARNING: Discussed the Food and Drug Administration (FDA) requires that all antidepressants carry a warning that some children, adolescents and young adults may be at increased risk of suicide when taking antidepressants. Anyone taking an antidepressant should be watched closely for worsening depression or unusual behavior especially in the first few weeks after starting an SSRI. Keep in mind, antidepressants are more likely to reduce suicide risk in the long run by improving mood.   SEROTONIN SYNDROME:  Discussed risks of Serotonin syndrome (ie, serotonin toxicity) which is a potentially life-threatening condition associated with increased serotonergic activity in the central nervous system (CNS). It is seen with therapeutic medication use, inadvertent interactions between drugs, and intentional self-poisoning. Serotonin syndrome may involve a spectrum of clinical findings, which often include mental status changes, autonomic hyperactivity, and neuromuscular abnormalities.    BENZODIAZEPINE:  discussion on  how benzos work and the need to use them short term due to potential of anxiety getting.  This is a controlled substance with risk for abuse, need to keep in a safe keep place and cannot replace lost scripts.    HYPNOTIC USE: Hypnotic use, risk for CNS depression, sleep-walking, not to mix with ETOH or other CNS depressant, need for six hours of sleep, stop if change in mood.  This is a controlled substance with risk for abuse, need to keep in a safe keep place and cannot replace lost scripts.  FIRST GENERATION ANTIPSYCHOTIC/ SECOND GENERATION ANTIPSYCHOTIC USE:  Atypical need for cardiometabolic monitoring with medication- B/P, weight, blood sugar, cholesterol.  Need to monitor for abnormal movements taught  SAFETY:  We all care about your loved one's safety. To reduce the risk of self-harm, remove access to all:  Firearms, Medicines (both prescribed and over-the-counter), Knives and other sharp objects, Ropes and like materials, and Alcohol  SLEEP HYGIENE: establish a sleep routine, limit screen time 1 hour prior to bed, use bed for sleep only, take sleep/medications on time (including sleepy time tea, trazadone or herbal treatments such as melatonin), aroma therapy, limit caffeine/sugar, yoga, guided imagery, stretch, meditation, limit naps to 20 minutes, make a temperature change in the room, white noise, be mindful of slowing down breathing, take a warm bath/shower, frequently wash sheets, and journaling.   Medlineplus.gov is information for patients.  It is run by the National Library of Medicine and it contains information about all disorders, diseases and all medications.      COMMUNITY RESOURCES:    CRISIS NUMBERS: Provided in AVS 7/24/2023  National Suicide Prevention Lifeline: 8-170-194-TALK (898-408-3037)  docBeat/resources for a list of additional resources (SOS)            [facility] - [phone]   Urgent Care Adult Mental [place]-[phone] mobile unit/ 24/7 crisis  line  [facility] -[phone]   COPE  [place] Mobile Team -[phone] (adults)/ [phone] (child)  Poison Control Center - 9-642-076-7704    OR  go to nearest ER  Crisis Text Line for any crisis  send this-   To: 371730   [facility] ([facility]) [facility]  [phone]  National Suicide Prevention Lifeline: 793.121.6288 (TTY: 289.657.8654). Call anytime for help.  (www.suicidepreventionlifeline.org)  National [place] on Mental Illness (www.[place].org): 422.904.4984 or 770-507-1954.   Mental Health Association (www.mentalhealth.org): 775.497.9808 or 818-441-4699.  [place] Crisis Text Line: Text [place] to 442774  Suicide LifeLine Chat: suicideStarsVu.org/chat    ADMINISTRATIVE BILLIN min spent interviewing patient, reviewing referral documents, obtaining and reviewing outside records, communication with other health specialists, and preparing this report on this day: 12/10/24    Video/Phone Start Time:  1306  Video/Phone End Time:   1328    Greater than 50% of time was spent in counseling and coordination of care regarding above diagnoses and treatment plan.    The longitudinal plan of care for the diagnosis(es)/condition(s) as documented were addressed during this visit. Due to the added complexity in care, I will continue to support Ashleigh in the subsequent management and with ongoing continuity of care.     Signed:   Jovan Manjarrez, MSN, APRN, PMHNP-BC  Long Term Outpatient Psychiatry  Chart documentation done in part with Dragon Voice Recognition software.  Although reviewed after completion, some word and grammatical errors may remain. Answers submitted by the patient for this visit:  Patient Health Questionnaire (Submitted on 2023)  If you checked off any problems, how difficult have these problems made it for you to do your work, take care of things at home, or get along with other people?: Very difficult  PHQ9 TOTAL SCORE: 14Answers submitted by  the patient for this visit:  Patient Health Questionnaire (Submitted on 10/3/2023)  If you checked off any problems, how difficult have these problems made it for you to do your work, take care of things at home, or get along with other people?: Not difficult at all  PHQ9 TOTAL SCORE: 3    Answers submitted by the patient for this visit:  Patient Health Questionnaire (Submitted on 11/13/2023)  If you checked off any problems, how difficult have these problems made it for you to do your work, take care of things at home, or get along with other people?: Somewhat difficult  PHQ9 TOTAL SCORE: 12    Answers submitted by the patient for this visit:  Patient Health Questionnaire (Submitted on 1/21/2024)  If you checked off any problems, how difficult have these problems made it for you to do your work, take care of things at home, or get along with other people?: Very difficult  PHQ9 TOTAL SCORE: 13  ASIA-7 (Submitted on 1/21/2024)  ASIA 7 TOTAL SCORE: 9    Answers submitted by the patient for this visit:  Patient Health Questionnaire (Submitted on 4/19/2024)  If you checked off any problems, how difficult have these problems made it for you to do your work, take care of things at home, or get along with other people?: Not difficult at all  PHQ9 TOTAL SCORE: 3    Answers submitted by the patient for this visit:  Patient Health Questionnaire (Submitted on 5/31/2024)  If you checked off any problems, how difficult have these problems made it for you to do your work, take care of things at home, or get along with other people?: Somewhat difficult  PHQ9 TOTAL SCORE: 7    Answers submitted by the patient for this visit:  Patient Health Questionnaire (Submitted on 7/12/2024)  If you checked off any problems, how difficult have these problems made it for you to do your work, take care of things at home, or get along with other people?: Very difficult  PHQ9 TOTAL SCORE: 11    Answers submitted by the patient for this  visit:  Patient Health Questionnaire (Submitted on 8/30/2024)  If you checked off any problems, how difficult have these problems made it for you to do your work, take care of things at home, or get along with other people?: Somewhat difficult  PHQ9 TOTAL SCORE: 6    Answers submitted by the patient for this visit:  Patient Health Questionnaire (Submitted on 12/10/2024)  If you checked off any problems, how difficult have these problems made it for you to do your work, take care of things at home, or get along with other people?: Very difficult  PHQ9 TOTAL SCORE: 15

## 2024-12-10 NOTE — PATIENT INSTRUCTIONS
Plan:  1.Patient will take the medications as prescribed.   Medications: Continue Effexor 150 mg daily in addition to EFFEXOR 75 mg making a total daily dose of 225 mg for depression and anxiety  Continue Abilify 5 mg daily for mood, depression and anxiety  Continue propranolol 10 mg twice daily as needed for anxiety  Patient will not stop taking medications or adjust them without consulting with the provider.  2.Patient will call with any problems between visits.  3.Patient will go to the emergency room if not feeling safe , unable to function in the community, or if suicidal, homicidal or hearing voices or having paranoia.  4.Patient will abstain from drugs and alcohol./Pt denies use .  5.Patient will not drive if sedated on medications or under influence of any substance.  6.Patient will not mix psychiatric medications with drugs and alcohol.   7.Patient will watch his diet and exercise.  8.Patient will see non psychiatric providers for non psychiatric disorders.  9. Next appointment in 6 weeks

## 2024-12-10 NOTE — PROGRESS NOTES
Virtual Visit Details    Type of service:  Video Visit     Originating Location (pt. Location): Home    Distant Location (provider location):  Off-site  Platform used for Video Visit: [employer]  Answers submitted by the patient for this visit:  Patient Health Questionnaire (Submitted on 12/10/2024)  If you checked off any problems, how difficult have these problems made it for you to do your work, take care of things at home, or get along with other people?: Very difficult  PHQ9 TOTAL SCORE: 15

## 2024-12-10 NOTE — NURSING NOTE
Is the patient currently in the state of [place]? YES    Current patient location: [address] [place] [id number]    Visit mode:VIDEO    If the visit is dropped, the patient can be reconnected by: VIDEO VISIT: Text to cell phone:   Telephone Information:   Mobile [phone]       Will anyone else be joining the visit? No  (If patient encounters technical issues they should call [phone])    Are changes needed to the allergy or medication list? No    Are refills needed on medications prescribed by this physician? Yes    Rooming Documentation: Questionnaire(s) completed.    Reason for visit: RECHUVALDO Hayden

## 2024-12-11 DIAGNOSIS — F41.1 GAD (GENERALIZED ANXIETY DISORDER): ICD-10-CM

## 2024-12-11 DIAGNOSIS — F33.41 RECURRENT MAJOR DEPRESSIVE DISORDER, IN PARTIAL REMISSION (H): ICD-10-CM

## 2024-12-11 DIAGNOSIS — F41.9 ANXIETY: ICD-10-CM

## 2024-12-12 DIAGNOSIS — F41.9 ANXIETY: ICD-10-CM

## 2024-12-12 DIAGNOSIS — F33.1 MAJOR DEPRESSIVE DISORDER, RECURRENT EPISODE, MODERATE (H): ICD-10-CM

## 2024-12-12 RX ORDER — VENLAFAXINE HYDROCHLORIDE 75 MG/1
CAPSULE, EXTENDED RELEASE ORAL
Qty: 30 CAPSULE | Refills: 1 | Status: SHIPPED | OUTPATIENT
Start: 2024-12-12

## 2024-12-12 RX ORDER — ARIPIPRAZOLE 5 MG/1
5 TABLET ORAL DAILY
Qty: 30 TABLET | Refills: 1 | Status: SHIPPED | OUTPATIENT
Start: 2024-12-12

## 2024-12-12 RX ORDER — PROPRANOLOL HYDROCHLORIDE 10 MG/1
TABLET ORAL
Qty: 60 TABLET | Refills: 1 | Status: SHIPPED | OUTPATIENT
Start: 2024-12-12

## 2024-12-12 RX ORDER — VENLAFAXINE HYDROCHLORIDE 150 MG/1
150 CAPSULE, EXTENDED RELEASE ORAL DAILY
Qty: 90 CAPSULE | Refills: 1 | Status: SHIPPED | OUTPATIENT
Start: 2024-12-12

## 2024-12-12 NOTE — TELEPHONE ENCOUNTER
Date of Last Office Visit: 12/10/24  Date of Next Office Visit:  1/21/25  No shows since last visit: No  More than one patient-initiated cancellation (with reschedule) since last seen in clinic? No    []Medication refilled per  Medication Refill in Ambulatory Care  policy.  []Medication unable to be refilled by RN due to criteria not met as indicated below:               []Eligibility: has not had a provider visit within last 6 months              []Supervision: no future appointment; < 7 days before next appointment              []Compliance: no shows; cancellations; lapse in therapy              []Verification: order discrepancy; may need modification...              [] > 30-day supply request              []Advanced refill request: > 7 days before refill date              []Controlled medication              []Medication not included in policy              []Review: new med; med adjusted <= 30 days; safety alert; requires lab monitoring...              []Scope of Practice: refill request processed by LPN/MA              [x]Other:        Medication(s) requested:        -  ARIPiprazole (ABILIFY) 5 MG tablet   Date last ordered: 7/12/24  Qty: 30  Refills: 1  Appropriate for refill? Provider to review. Refill last picked up 9/20/24 per pharmacy.      NOTE:  Request to refill Effexor and Propranolol sent to you earlier.  Patient possibly was inconsistent taking Abilify as well, given dates of prescriptions and refills picked up per pharmacy. RN attempted to call patient, no answer;  did not identify her so no message left.     Any Controlled Substance(s)? No        Requested medication(s) verified as identical to current order? Yes     Any lapse in adherence to medication(s) greater than 5 days? Unknown      Additional action taken? routed encounter to provider for review and attempted to reach patient to clarify how she is taking effexor.        Last visit treatment plan:   Plan:  1.Patient will take the  medications as prescribed.   Medications: Continue Effexor 150 mg daily in addition to EFFEXOR 75 mg making a total daily dose of 225 mg for depression and anxiety  Continue Abilify 5 mg daily for mood, depression and anxiety  Continue propranolol 10 mg twice daily as needed for anxiety  Patient will not stop taking medications or adjust them without consulting with the provider.  2.Patient will call with any problems between visits.  3.Patient will go to the emergency room if not feeling safe , unable to function in the community, or if suicidal, homicidal or hearing voices or having paranoia.  4.Patient will abstain from drugs and alcohol./Pt denies use .  5.Patient will not drive if sedated on medications or under influence of any substance.  6.Patient will not mix psychiatric medications with drugs and alcohol.   7.Patient will watch his diet and exercise.  8.Patient will see non psychiatric providers for non psychiatric disorders.  9. Next appointment in 6 weeks    Any medication(s) require lab monitoring? No

## 2024-12-12 NOTE — TELEPHONE ENCOUNTER
Date of Last Office Visit: 12/10/24  Date of Next Office Visit:  1/21/25  No shows since last visit: No  More than one patient-initiated cancellation (with reschedule) since last seen in clinic? No    []Medication refilled per  Medication Refill in Ambulatory Care  policy.  []Medication unable to be refilled by RN due to criteria not met as indicated below:    []Eligibility: has not had a provider visit within last 6 months   []Supervision: no future appointment; < 7 days before next appointment   []Compliance: no shows; cancellations; lapse in therapy   []Verification: order discrepancy; may need modification...   [x] > 30-day supply request   []Advanced refill request: > 7 days before refill date   []Controlled medication   []Medication not included in policy   []Review: new med; med adjusted <= 30 days; safety alert; requires lab monitoring...   []Scope of Practice: refill request processed by LPN/MA   [x]Other:      Medication(s) requested:     -  venlafaxine (EFFEXOR XR) 150 MG 24 hr capsule   Date last ordered: 4/15/24  Qty: 90  Refills: 1  Appropriate for refill? Provider to review. Patient would have run out apx 10/15/24  but pharm states last filled 9/29/24 for 90 days (thru 12/29/24)    -  venlafaxine (EFFEXOR XR) 75 MG 24 hr capsule   Date last ordered: 8/8/24  Qty: 30  Refills: 1  Appropriate for refill? Provider to review. Last refill for 30 days picked up on 9/20/24 per pharmacy.    -  propranolol (INDERAL) 10 MG tablet   Date last ordered: 8/30/24  Qty: 60  Refills: 1  Appropriate for refill? Provider to review. Refill picked up 9/29/24 per pharmacy.     NOTE:  Consider adjusting Effexor quantities to match?  One is currently 90 days, the other 30 days.    Also unclear how she has been taking this, inconsistent now or in the past? (See notes above.)   RN attempted to call patient, no answer; [place] did not identify her so no message left.    Any Controlled Substance(s)? No      Requested medication(s)  verified as identical to current order? Yes    Any lapse in adherence to medication(s) greater than 5 days? Unknown     Additional action taken? routed encounter to provider for review and attempted to reach patient to clarify how she is taking effexor.      Last visit treatment plan:   Plan:  1.Patient will take the medications as prescribed.   Medications: Continue Effexor 150 mg daily in addition to EFFEXOR 75 mg making a total daily dose of 225 mg for depression and anxiety  Continue Abilify 5 mg daily for mood, depression and anxiety  Continue propranolol 10 mg twice daily as needed for anxiety  Patient will not stop taking medications or adjust them without consulting with the provider.  2.Patient will call with any problems between visits.  3.Patient will go to the emergency room if not feeling safe , unable to function in the community, or if suicidal, homicidal or hearing voices or having paranoia.  4.Patient will abstain from drugs and alcohol./Pt denies use .  5.Patient will not drive if sedated on medications or under influence of any substance.  6.Patient will not mix psychiatric medications with drugs and alcohol.   7.Patient will watch his diet and exercise.  8.Patient will see non psychiatric providers for non psychiatric disorders.  9. Next appointment in 6 weeks    Any medication(s) require lab monitoring? No

## 2025-02-03 ENCOUNTER — VIRTUAL VISIT (OUTPATIENT)
Dept: MIDWIFE SERVICES | Facility: CLINIC | Age: 25
End: 2025-02-03
Payer: COMMERCIAL

## 2025-02-03 DIAGNOSIS — Z30.41 ENCOUNTER FOR SURVEILLANCE OF CONTRACEPTIVE PILLS: Primary | ICD-10-CM

## 2025-02-03 PROCEDURE — 98005 SYNCH AUDIO-VIDEO EST LOW 20: CPT | Performed by: ADVANCED PRACTICE MIDWIFE

## 2025-02-03 RX ORDER — LEVONORGESTREL AND ETHINYL ESTRADIOL 0.15-0.03
1 KIT ORAL DAILY
Qty: 91 TABLET | Refills: 3 | Status: SHIPPED | OUTPATIENT
Start: 2025-02-03

## 2025-02-03 NOTE — PROGRESS NOTES
S: Patient presents for video visit for a refill of COCs. She has been using Seasonale and likes this method. She has no other questions or concerns today. Recent STD testing on file.     O:   No vitals due to video visit    General: well appearing, in NAD  Cardiac: well perfused  Respiratory: non-labored breathing on room air   Psych: alert and oriented     A/P:   (Z30.41) Encounter for surveillance of contraceptive pills  (primary encounter diagnosis)  Comment: Patient is due for a pap so should plan an in-person visit for an annual exam in August or sooner for pap only visit if desired.   Plan: levonorgestrel-ethinyl estradiol (SEASONALE)         0.15-0.03 MG tablet          Encouraged to reach out with any questions or concerns  Jenn Jang CNM    Virtual Visit Details  Type of service:  Video Visit   Originating Location (pt. Location): Home  Distant Location (provider location):  On-site  Platform used for Video Visit: Traci  Joined the call at 2/3/2025, 1:13:56 pm.  Left the call at 2/3/2025, 1:16:06 pm.  You were on the call for 2 minutes 10 seconds .

## 2025-02-12 DIAGNOSIS — F41.1 GAD (GENERALIZED ANXIETY DISORDER): ICD-10-CM

## 2025-02-12 DIAGNOSIS — F33.1 MAJOR DEPRESSIVE DISORDER, RECURRENT EPISODE, MODERATE (H): ICD-10-CM

## 2025-02-12 DIAGNOSIS — F33.41 RECURRENT MAJOR DEPRESSIVE DISORDER, IN PARTIAL REMISSION: ICD-10-CM

## 2025-02-12 DIAGNOSIS — F41.9 ANXIETY: ICD-10-CM

## 2025-02-12 RX ORDER — VENLAFAXINE HYDROCHLORIDE 75 MG/1
CAPSULE, EXTENDED RELEASE ORAL
Qty: 30 CAPSULE | Refills: 0 | Status: SHIPPED | OUTPATIENT
Start: 2025-02-12

## 2025-02-12 RX ORDER — PROPRANOLOL HYDROCHLORIDE 10 MG/1
TABLET ORAL
Qty: 60 TABLET | Refills: 0 | Status: SHIPPED | OUTPATIENT
Start: 2025-02-12

## 2025-02-12 RX ORDER — ARIPIPRAZOLE 5 MG/1
5 TABLET ORAL DAILY
Qty: 30 TABLET | Refills: 0 | Status: SHIPPED | OUTPATIENT
Start: 2025-02-12

## 2025-02-12 NOTE — TELEPHONE ENCOUNTER
Date of Last Office Visit: 12/10/24  Date of Next Office Visit: None; routing for A to assist pt with scheduling.    No shows since last visit: No  More than one patient-initiated cancellation (with reschedule) since last seen in clinic? No    []Medication refilled per  Medication Refill in Ambulatory Care  policy.  [x]Medication unable to be refilled by RN due to criteria not met as indicated below:    []Eligibility: has not had a provider visit within last 6 months   [x]Supervision: no future appointment; < 7 days before next appointment   []Compliance: no shows; cancellations; lapse in therapy   []Verification: order discrepancy; may need modification...   [] > 30-day supply request   []Advanced refill request: > 7 days before refill date   []Controlled medication   []Medication not included in policy   []Review: new med; med adjusted <= 30 days; safety alert; requires lab monitoring...   []Scope of Practice: refill request processed by LPN/MA   []Other:      Medication(s) requested:     -  venlafaxine (EFFEXOR XR) 75 MG 24 hr capsule   Date last ordered: 12/12/24  Qty: 30  Refills: 1  Appropriate for refill? Yes    -  propranolol (INDERAL) 10 MG tablet   Date last ordered: 12/12/24  Qty: 60  Refills: 1  Appropriate for refill? Yes    -  ARIPiprazole (ABILIFY) 5 MG tablet   Date last ordered: 12/12/24  Qty: 30  Refills: 1  Appropriate for refill? Yes      Any Controlled Substance(s)? No      Requested medication(s) verified as identical to current order? Yes    Any lapse in adherence to medication(s) greater than 5 days? No      Additional action taken? routed encounter to provider for review.      Last visit treatment plan:   Plan:  1.Patient will take the medications as prescribed.   Medications: Continue Effexor 150 mg daily in addition to EFFEXOR 75 mg making a total daily dose of 225 mg for depression and anxiety  Continue Abilify 5 mg daily for mood, depression and anxiety  Continue propranolol 10 mg  twice daily as needed for anxiety  Patient will not stop taking medications or adjust them without consulting with the provider.  2.Patient will call with any problems between visits.  3.Patient will go to the emergency room if not feeling safe , unable to function in the community, or if suicidal, homicidal or hearing voices or having paranoia.  4.Patient will abstain from drugs and alcohol./Pt denies use .  5.Patient will not drive if sedated on medications or under influence of any substance.  6.Patient will not mix psychiatric medications with drugs and alcohol.   7.Patient will watch his diet and exercise.  8.Patient will see non psychiatric providers for non psychiatric disorders.  9. Next appointment in 6 weeks    Any medication(s) require lab monitoring? No    Gina Anton RN on 2/12/2025 at 10:24 AM

## 2025-02-19 ENCOUNTER — VIRTUAL VISIT (OUTPATIENT)
Dept: PSYCHIATRY | Facility: CLINIC | Age: 25
End: 2025-02-19
Payer: COMMERCIAL

## 2025-02-19 DIAGNOSIS — F12.20 CANNABIS DEPENDENCE (H): ICD-10-CM

## 2025-02-19 DIAGNOSIS — F33.41 RECURRENT MAJOR DEPRESSIVE DISORDER, IN PARTIAL REMISSION: Primary | ICD-10-CM

## 2025-02-19 DIAGNOSIS — F10.20 UNCOMPLICATED ALCOHOL DEPENDENCE (H): ICD-10-CM

## 2025-02-19 ASSESSMENT — PATIENT HEALTH QUESTIONNAIRE - PHQ9
10. IF YOU CHECKED OFF ANY PROBLEMS, HOW DIFFICULT HAVE THESE PROBLEMS MADE IT FOR YOU TO DO YOUR WORK, TAKE CARE OF THINGS AT HOME, OR GET ALONG WITH OTHER PEOPLE: SOMEWHAT DIFFICULT
SUM OF ALL RESPONSES TO PHQ QUESTIONS 1-9: 5
SUM OF ALL RESPONSES TO PHQ QUESTIONS 1-9: 5

## 2025-02-19 NOTE — PATIENT INSTRUCTIONS
"Patient Education   [facility]  What to Expect  Here's what to expect in the [facility].   About the program  You'll be meeting with a psychiatric doctor to check your mental health. A psychiatric doctor helps you deal with troubling thoughts and feelings by giving you medicine. They'll make sure you know the plan for your care. You may see them for a long time. When you're feeling better, they may refer you back to seeing your family doctor.   If you have any questions, we'll be glad to talk to you.  About visits  Be open  At your visits, please talk openly about your problems. It may feel hard, but it's the best way for us to help you.  Cancelling visits  If you can't come to your visit, please call us right away at [phone]. If you don't cancel at least 24 hours (1 full day) before your visit, that's \"late cancellation.\"  Not showing up for your visits  Being very late is the same as not showing up. You'll be a \"no show\" if:  You're more than 15 minutes late for a 30-minute (half hour) visit.  You're more than 30 minutes late for a 60-minute (full hour) visit.  If you cancel late or don't show up 2 times within 6 months, we may end your care.  Getting help between visits  If you need help between visits, you can call us Monday to Friday from 8 a.m. to 4:30 p.m. at [phone].  Emergency care  Call 911 or go to the nearest emergency department if your life or someone else's life is in danger.  Call 988 anytime to reach the national Suicide and Crisis hotline.  Medicine refills  To refill your medicine, call your pharmacy. You can also call [facility]'s [facility] at [phone], Monday to Friday, 8 a.m. to 4:30 p.m. It can take 1 to 3 business days to get a refill.   Forms, letters, and tests  You may have papers to fill out, like FMLA, short-term disability, and workability. We can help you with these forms at your visits, but you must have an " appointment. You may need more than 1 visit for this, to be in an intensive therapy program, or both.  Before we can give you medicine for ADHD, we may refer you to get tested for it or confirm it another way.  We may not be able to give you an emotional support animal letter.  We don't do mental health checks ordered by the court.   We don't do mental health testing, but we can refer you to get tested.   Thank you for choosing us for your care.  For informational purposes only. Not to replace the advice of your health care provider. Copyright   2022 [facility]. All rights reserved. [employer] [id number] - Rev 11/24.   Plan:  1.Patient will take the medications as prescribed.   Medications: Continue Effexor 150 mg daily in addition to EFFEXOR 75 mg making a total daily dose of 225 mg for depression and anxiety  Continue Abilify 5 mg daily for mood, depression and anxiety  Continue propranolol 10 mg twice daily as needed for anxiety  Patient will not stop taking medications or adjust them without consulting with the provider.  2.Patient will call with any problems between visits.  3.Patient will go to the emergency room if not feeling safe , unable to function in the community, or if suicidal, homicidal or hearing voices or having paranoia.  4.Patient will abstain from drugs and alcohol./Pt denies use .  5.Patient will not drive if sedated on medications or under influence of any substance.  6.Patient will not mix psychiatric medications with drugs and alcohol.   7.Patient will watch his diet and exercise.  8.Patient will see non psychiatric providers for non psychiatric disorders.  9. Next appointment in 6 weeks

## 2025-02-19 NOTE — PROGRESS NOTES
"PSYCHIATRIC PROGRESS NOTE     Name:  Ashleigh Bustillo  : 2000    Ashleigh Bustillo is a 23 year old female who is being evaluated via a billable Video visit.      Telemedicine Visit: The patient's condition can be safely assessed and treated via synchronous audio and visual telemedicine encounter.      Reason for Telemedicine Visit: COVID 19 pandemic and the social and physical recommendations by the CDC and MD., Patient has requested telehealth visit, and Patient unable to travel      Originating Site (Patient Location): Patient's home    Distant Site (Provider Location): [facility] Outpatient Setting: [facility]    Consent:  The patient/guardian has verbally consented to: the potential risks and benefits of telemedicine (video visit or phone) versus in person care; bill my insurance or make self-payment for services provided; and responsibility for payment of non-covered services.     Mode of Communication:  May[employer] platform     As the provider I attest to compliance with applicable laws and regulations related to telemedicine.    Date of Last Visit: 12/10/24                                          CHIEF COMPLAINT   \"I'm doing well\"    HISTORY OF PRESENT ILLNESS     Patient who was last seen in the clinic on 12/10/24 returned today for follow up visit.  Patient reports she has noticed significant improvement from depressive symptoms and anxiety since last visit.  Patient states she is now more motivated with good energy.  Patient states that she has been consistently doing house chores as well as performing activities of daily living like proper hygiene.  Patient reports she is currently thinking about going back to school.  Patient states she will be having a job interview next week.  Patient reports she still engages in heavy alcohol use but with decreased frequency.  Patient stated she only drinks alcohol about 2-3 times a weeks.  Patient states that she continues to smoke marijuana daily.  " Patient denies both suicidal and homicidal ideation.   She also denies both auditory and visual hallucination.  Patient report no marbella or hypomania.  Patient return to clinic in 6 weeks for follow-up.   PSYCHIATRIC HISTORY:   History of Psychiatric Hospitalizations:   - Inpatient: Once when she was 14 years old  - IOP/PHP/Day treatment: None  History of Suicidal Ideation: Positive   History of Suicide Attempts:Positive   History of Self-injurious Behavior: Denies a history of SIB.  Current:  No  History of Violence/Aggression: Negative  History of Commitment? Negative  Electroconvulsive Therapy (ECT):Negative    PSYCHIATRIC REVIEW OF SYSTEMS:   Psychiatric Review of Systems:   Depression:   Reports: depressed mood, suicidal ideation, decreased interest, changes in sleep, changes in appetite, guilt, hopelessness, helplessness, impaired concentration, decreased energy, irritability.  Marbella:   Denies: sleeplessness, increased goal-directed activities, abrupt increase in energy pressured speech  Psychosis:   Denies: visual hallucinations, auditory hallucinations, paranoia  Anxiety:   Reports: excessive worries that are difficult to control, panic attacks  PTSD:   Reports: re-experiencing past trauma, nightmares, increased arousal, avoidance of traumatic stimuli, impaired function.  Denies: re-experiencing past trauma, nightmares, increased arousal, avoidance of traumatic stimuli, impaired function.  OCD:   Denies: obsessions, checking, symmetry, cleaning, skin picking.  Eating Disorder:   Denies: restriction, binging, purging.    Sleep:       MEDICATIONS                                                                                              One   Current Outpatient Medications   Medication Sig Dispense Refill    ARIPiprazole (ABILIFY) 5 MG tablet TAKE 1 TABLET(5 MG) BY MOUTH DAILY 30 tablet 0    levonorgestrel-ethinyl estradiol (SEASONALE) 0.15-0.03 MG tablet Take 1 tablet by mouth daily. 91 tablet 3    naltrexone  "(DEPADE/REVIA) 50 MG tablet Take 1 tablet (50 mg) by mouth daily. Take 1/2 tablet daily for six days, then increase to one full tablet 30 tablet 1    nicotine (NICODERM CQ) 14 MG/24HR 24 hr patch Place 1 patch over 24 hours onto the skin every 24 hours. 30 patch 1    nicotine (NICORETTE) 2 MG gum Place 1 each (2 mg) inside cheek as needed for nicotine withdrawal symptoms. 110 each 2    propranolol (INDERAL) 10 MG tablet TAKE 1 TABLET(10 MG) BY MOUTH TWICE DAILY AS NEEDED FOR ANXIETY 60 tablet 0    venlafaxine (EFFEXOR XR) 150 MG 24 hr capsule TAKE 1 CAPSULE(150 MG) BY MOUTH DAILY 90 capsule 1    venlafaxine (EFFEXOR XR) 75 MG 24 hr capsule TAKE 1 CAPSULE(75 MG) BY MOUTH DAILY IN ADDITION TO EFFEXOR  MG MAKING A. TOTAL DAILY DOSE  MG 30 capsule 0    VITAMIN D PO  (Patient not taking: Reported on 12/10/2024)       No current facility-administered medications for this visit.       DRUG MONITORING:  [place] Prescription Monitoring Program evaluating controlled substances in the last year in [place]:  The [place] Prescription Monitoring Program has been reviewed and there are no current concerns with: diversionary activity, early refill requests, and or obtaining the medication from multiple providers       PAST PSYCHOTROPIC MEDICATIONS:  Prozac, Effexor    VITALS   There were no vitals taken for this visit.     BP Readings from Last 1 Encounters:   08/12/24 118/75     Pulse Readings from Last 1 Encounters:   08/12/24 103     Wt Readings from Last 1 Encounters:   08/30/24 55.8 kg (123 lb)     Ht Readings from Last 1 Encounters:   08/30/24 1.575 m (5' 2\")     Estimated body mass index is 22.5 kg/m  as calculated from the following:    Height as of 8/30/24: 1.575 m (5' 2\").    Weight as of 8/30/24: 55.8 kg (123 lb).      PERTINENT HISTORY   PAST MEDICAL HISTORY:   Past Medical History:   Diagnosis Date    Anorexia 03/2022    Anxiety     Depressive disorder     Mild episode of recurrent major depressive disorder  " "   Suicide attempt (H)     at least once, hospitalized       PAST SURGICAL HISTORY: No past surgical history on file.    FAMILY HISTORY:   Family History   Problem Relation Age of Onset    Scoliosis Sister     Diabetes Maternal Grandmother        SOCIAL HISTORY:   Social History     Tobacco Use    Smoking status: Never     Passive exposure: Current    Smokeless tobacco: Never    Tobacco comments:     Daily vaping now   Substance Use Topics    Alcohol use: Yes     Comment: once a month drinks 1-2         Seizures or Head Injury: Denies history of head injury. Denies history of seizures.  History of cardiac disease, rheumatic fever, fainting or dizziness, especially with exercise, seizures, chest pain or shortness of breath with exercise, unexplained change in exercise tolerance, palpitations, high blood pressure, or heart murmur?   No    LABS & IMAGING                                                                                                                Personally reviewed  Recent Labs   Lab Test 04/08/22  1219   WBC 6.4   HGB 13.1   HCT 38.5   MCV 88        Recent Labs   Lab Test 04/08/22  1219      POTASSIUM 3.9   CHLORIDE 107   CO2 21*   GLC 77   SUE 9.0   BUN 8   CR 0.64   GFRESTIMATED >90   ALBUMIN 3.9   PROTTOTAL 7.5   AST 14   ALT 10   ALKPHOS 59   BILITOTAL 0.6     Recent Labs   Lab Test 04/08/22  1219   CHOL 124   LDL 46   HDL 63   TRIG 73     Recent Labs   Lab Test 08/12/24  1348   TSH 0.90     No results found for: \"EQI838\", \"SBNH383\", \"DHLF73UXFFC\", \"VITD3\", \"D2VIT\", \"D3VIT\", \"DTOT\", \"[id number]\", \"[id number]\", \"[id number]\", \"[id number]\", \"[id number]\", \"[id number]\"     ALLERGY & IMMUNIZATIONS     No Known Allergies    FAMILY MEDICAL HISTORY:     Family History       Problem (# of Occurrences) Relation (Name,Age of Onset)    Diabetes (1) Maternal Grandmother (Danielle Sutherland)    Scoliosis (1) Sister              Family history of sudden or unexplained death or an event requiring resuscitation " in children or young adults, cardiac arrhythmias (eg, Malika-Parkinson-White syndrome), long QT syndrome, catecholaminergic paroxysmal ventricular tachycardia, Brugada syndrome, arrhythmogenic right ventricular dysplasia, hypertrophic cardiomyopathy, dilated cardiomyopathy, or Marfan syndrome?  No    FAMILY PSYCHIATRIC HISTORY:   Psychiatry:Denies  Substance use history in family: Brother and day struggled with drug abuse  Family suicide history: Denies      SIGNIFICANT SOCIAL/FAMILY HISTORY:                                           Born and raised in: [place]  Relationship status: Single, has a boyfriend  Children: None    Highest education level was:currently in college   Service:Denies   Employment status: Part time working at the [employer]  LEGAL:Denies     SUBSTANCE USE HISTORY    Tobacco use: Vapes occasionally  Caffeine: Denies  Current alcohol: 2-3 drinks once weekly  Current substance use:Marijuana  Past use alcohol/substance use:Shawnanna      MEDICAL REVIEW OF SYSTEMS:   Ten system review was completed with pertinent positives noted above    MENTAL STATUS EXAM:   Mental Status Examination (limited due to video virtual visit format):  Vital Signs: There were no vitals taken for this virtual visit.  Appearance: adequately groomed, appears stated age, and in no apparent distress.  Attitude: cooperative   Eye Contact: good to the extent that can be determined in a video visit  Muscle Strength and Tone: no gross abnormalities based on remote observation  Psychomotor Behavior:  no evidence of tardive dyskinesia, dystonia, or tics based on remote observation  Gait and Station: normal, no gross abnormalities based on remote observation  Speech: clear, coherent, normal prosody, regular rate, regular rhythm and fluent  Associations: No loosening of associations  Thought Process: coherent and goal directed  Thought Content: no evidence of suicidal ideation or homicidal ideation, no evidence  "of psychotic thought, no auditory hallucinations present and no visual hallucinations present  Mood: \"good\"  Affect: appropriate and in normal range  Insight: good  Judgment: intact, adequate for safety  Impulse Control: intact  Oriented to: time, place, person and situation  Attention Span and Concentration: normal  Language: Intact  Recent and Remote Memory: intact to interview. Not formally assessed. No amnesia.  Fund of Knowledge: appropriate        SAFETY   Feels safe in home: Yes   Suicidal ideation: Denies  History of suicide attempts:  No   Hx of impulsivity: No     DSM 5 DIAGNOSIS:   1. Recurrent major depressive disorder, in partial remission (H)    2. Anxiety    ASSESSMENT AND PLAN    Patient is a 23 year old,   Not  or  female  with a history of generalized anxiety disorder, major depressive disorder, and eating disorder female  who presents for follow up visit.  Patient has been applying for jobs and also planning to going back to school.  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  Depression and anxiety have gotten a lot better since last visit as she has been consistently taking her medication daily.  She still engages in heavy alcohol use but with decreased frequency.  She not drinks about 2-3 times a week.  He still smokes marijuana daily.  She makes appointment with addiction medicine in December.  Patient plans to reschedule follow-up appointment with addiction medicine.  I educated patient about how mood and mind altering substance like alcohol and marijuana can make symptoms worse.  She denies both suicidal and homicidal ideation. She denies SIB, and HI. RTC in 6 weeks    Plan:  1.Patient will take the medications as prescribed.   Medications: Continue Effexor 150 mg daily in addition to EFFEXOR 75 mg making a total daily dose of 225 mg for depression and anxiety  Continue Abilify 5 mg daily for mood, depression and anxiety  Continue propranolol 10 mg twice " daily as needed for anxiety  Patient will not stop taking medications or adjust them without consulting with the provider.  2.Patient will call with any problems between visits.  3.Patient will go to the emergency room if not feeling safe , unable to function in the community, or if suicidal, homicidal or hearing voices or having paranoia.  4.Patient will abstain from drugs and alcohol./Pt denies use .  5.Patient will not drive if sedated on medications or under influence of any substance.  6.Patient will not mix psychiatric medications with drugs and alcohol.   7.Patient will watch his diet and exercise.  8.Patient will see non psychiatric providers for non psychiatric disorders.  9. Next appointment in 6 weeks    Risk Assessment:     Ashleigh has notable risk factors for self-harm, including, single status, anxiety and passive SI. However, risk is mitigated by ability to volunteer a safety plan and history of seeking help when needed. Additional steps taken to minimize risk include making medication adjustment, asking patient to call 911 and go to the ER if not able to stay safe at home,  Therefore, based on all available evidence including the factors cited above, Ashleigh does not appear to be an imminent danger to self or others and does not meet criteria 72 hour hold. However, if patient uses substances or is non-adherent with medication, their risk of decompensation and SI/HI will be elevated. This was discussed with the patient as she verbalized good understanding.   CONSULTS/REFERRALS:   Continue therapy  None at this time  Coordinate care with therapist as needed    MEDICAL:   None at this time  Coordinate care with PCP (Lauren Sen) as needed  Follow up with primary care provider as planned or for acute medical concerns.    PSYCHOEDUCATION:  Medication side effects and alternatives reviewed. Health promotion activities recommended and reviewed today. All questions addressed. Education and counseling  completed regarding risks and benefits of medications and psychotherapy options.  Consent provided by patient/guardian  Call the psychiatric nurse line with medication questions or concerns at [phone].  [employer] may be used to communicate with your provider, but this is not intended to be used for emergencies.  BLACK BOX WARNING: Discussed the Food and Drug Administration (FDA) requires that all antidepressants carry a warning that some children, adolescents and young adults may be at increased risk of suicide when taking antidepressants. Anyone taking an antidepressant should be watched closely for worsening depression or unusual behavior especially in the first few weeks after starting an SSRI. Keep in mind, antidepressants are more likely to reduce suicide risk in the long run by improving mood.   SEROTONIN SYNDROME:  Discussed risks of Serotonin syndrome (ie, serotonin toxicity) which is a potentially life-threatening condition associated with increased serotonergic activity in the central nervous system (CNS). It is seen with therapeutic medication use, inadvertent interactions between drugs, and intentional self-poisoning. Serotonin syndrome may involve a spectrum of clinical findings, which often include mental status changes, autonomic hyperactivity, and neuromuscular abnormalities.    BENZODIAZEPINE:  discussion on how benzos work and the need to use them short term due to potential of anxiety getting.  This is a controlled substance with risk for abuse, need to keep in a safe keep place and cannot replace lost scripts.    HYPNOTIC USE: Hypnotic use, risk for CNS depression, sleep-walking, not to mix with ETOH or other CNS depressant, need for six hours of sleep, stop if change in mood.  This is a controlled substance with risk for abuse, need to keep in a safe keep place and cannot replace lost scripts.  FIRST GENERATION ANTIPSYCHOTIC/ SECOND GENERATION ANTIPSYCHOTIC USE:  Atypical need for  cardiometabolic monitoring with medication- B/P, weight, blood sugar, cholesterol.  Need to monitor for abnormal movements taught  SAFETY:  We all care about your loved one's safety. To reduce the risk of self-harm, remove access to all:  Firearms, Medicines (both prescribed and over-the-counter), Knives and other sharp objects, Ropes and like materials, and Alcohol  SLEEP HYGIENE: establish a sleep routine, limit screen time 1 hour prior to bed, use bed for sleep only, take sleep/medications on time (including sleepy time tea, trazadone or herbal treatments such as melatonin), aroma therapy, limit caffeine/sugar, yoga, guided imagery, stretch, meditation, limit naps to 20 minutes, make a temperature change in the room, white noise, be mindful of slowing down breathing, take a warm bath/shower, frequently wash sheets, and journaling.   Medlineplus.gov is information for patients.  It is run by the [employer] and it contains information about all disorders, diseases and all medications.      COMMUNITY RESOURCES:    CRISIS NUMBERS: Provided in AVS 7/24/2023  National Suicide Prevention Lifeline: 7-816-963-TALK (569-219-0743)  [employer]/resources for a list of additional resources (SOS)            [facility] - [phone]   Urgent Care Adult Mental Hfpasn-[phone] mobile unit/ 24/7 crisis line  [facility] -[phone]   COPE 24/7 [place] Mobile Team -[phone] (adults)/ [phone] (child)  Poison Control Center - 1-556.742.2785    OR  go to nearest ER  Crisis Text Line for any crisis 24/7 send this-   To: 610827   [facility] ([facility]) [facility]  [phone]  National Suicide Prevention Lifeline: 430.817.1138 (TTY: 858.440.4381). Call anytime for help.  (www.suicidepreventionlifeline.org)  National Gill on Mental Illness (www.colby.org): 199.884.7905 or 305-178-8869.   Mental Health Association (www.mentalhealth.org): 374.369.9260 or  712-067-9398.  [place] Crisis Text Line: Text [place] to 801543  Suicide LifeLine Chat: suicidepreventionlifeline.org/chat    ADMINISTRATIVE BILLIN min spent interviewing patient, reviewing referral documents, obtaining and reviewing outside records, communication with other health specialists, and preparing this report on this day: 25    Video/Phone Start Time:  1104  Video/Phone End Time:   1118    Greater than 50% of time was spent in counseling and coordination of care regarding above diagnoses and treatment plan.    The longitudinal plan of care for the diagnosis(es)/condition(s) as documented were addressed during this visit. Due to the added complexity in care, I will continue to support Ashleigh in the subsequent management and with ongoing continuity of care.     Signed:   Jovan Manjarrez, MSN, APRN, PMHNP-BC  Long Term Outpatient Psychiatry  Chart documentation done in part with Dragon Voice Recognition software.  Although reviewed after completion, some word and grammatical errors may remain. Answers submitted by the patient for this visit:  Patient Health Questionnaire (Submitted on 2023)  If you checked off any problems, how difficult have these problems made it for you to do your work, take care of things at home, or get along with other people?: Very difficult  PHQ9 TOTAL SCORE: 14Answers submitted by the patient for this visit:  Patient Health Questionnaire (Submitted on 10/3/2023)  If you checked off any problems, how difficult have these problems made it for you to do your work, take care of things at home, or get along with other people?: Not difficult at all  PHQ9 TOTAL SCORE: 3    Answers submitted by the patient for this visit:  Patient Health Questionnaire (Submitted on 2023)  If you checked off any problems, how difficult have these problems made it for you to do your work, take care of things at home, or get along with other people?: Somewhat difficult  PHQ9 TOTAL SCORE:  12    Answers submitted by the patient for this visit:  Patient Health Questionnaire (Submitted on 1/21/2024)  If you checked off any problems, how difficult have these problems made it for you to do your work, take care of things at home, or get along with other people?: Very difficult  PHQ9 TOTAL SCORE: 13  ASIA-7 (Submitted on 1/21/2024)  ASIA 7 TOTAL SCORE: 9    Answers submitted by the patient for this visit:  Patient Health Questionnaire (Submitted on 4/19/2024)  If you checked off any problems, how difficult have these problems made it for you to do your work, take care of things at home, or get along with other people?: Not difficult at all  PHQ9 TOTAL SCORE: 3    Answers submitted by the patient for this visit:  Patient Health Questionnaire (Submitted on 5/31/2024)  If you checked off any problems, how difficult have these problems made it for you to do your work, take care of things at home, or get along with other people?: Somewhat difficult  PHQ9 TOTAL SCORE: 7    Answers submitted by the patient for this visit:  Patient Health Questionnaire (Submitted on 7/12/2024)  If you checked off any problems, how difficult have these problems made it for you to do your work, take care of things at home, or get along with other people?: Very difficult  PHQ9 TOTAL SCORE: 11    Answers submitted by the patient for this visit:  Patient Health Questionnaire (Submitted on 8/30/2024)  If you checked off any problems, how difficult have these problems made it for you to do your work, take care of things at home, or get along with other people?: Somewhat difficult  PHQ9 TOTAL SCORE: 6    Answers submitted by the patient for this visit:  Patient Health Questionnaire (Submitted on 12/10/2024)  If you checked off any problems, how difficult have these problems made it for you to do your work, take care of things at home, or get along with other people?: Very difficult  PHQ9 TOTAL SCORE: 15    Answers submitted by the patient  for this visit:  Patient Health Questionnaire (Submitted on 2/19/2025)  If you checked off any problems, how difficult have these problems made it for you to do your work, take care of things at home, or get along with other people?: Somewhat difficult  PHQ9 TOTAL SCORE: 5

## 2025-02-19 NOTE — NURSING NOTE
Is the patient currently in the state of [place]? YES    Current patient location: [address] [place] [id number]    Visit mode:Video    If the visit is dropped, the patient can be reconnected by: VIDEO VISIT: Text to cell phone:   Telephone Information:   Mobile [phone]       Will anyone else be joining the visit? No  (If patient encounters technical issues they should call [phone])    Are changes needed to the allergy or medication list? No    Are refills needed on medications prescribed by this physician? Discuss with Provider    Rooming Documentation: Questionnaire(s) completed.    Reason for visit: UVALDO Howard

## 2025-02-27 ENCOUNTER — ANESTHESIA EVENT (OUTPATIENT)
Dept: SURGERY | Facility: CLINIC | Age: 25
End: 2025-02-27
Payer: COMMERCIAL

## 2025-02-27 ENCOUNTER — ANESTHESIA (OUTPATIENT)
Dept: SURGERY | Facility: CLINIC | Age: 25
End: 2025-02-27
Payer: COMMERCIAL

## 2025-02-27 ENCOUNTER — HOSPITAL ENCOUNTER (OUTPATIENT)
Facility: CLINIC | Age: 25
End: 2025-02-27
Attending: SURGERY | Admitting: SURGERY
Payer: COMMERCIAL

## 2025-02-27 VITALS
HEART RATE: 90 BPM | WEIGHT: 125 LBS | BODY MASS INDEX: 23 KG/M2 | HEIGHT: 62 IN | OXYGEN SATURATION: 96 % | TEMPERATURE: 98.7 F | RESPIRATION RATE: 17 BRPM | DIASTOLIC BLOOD PRESSURE: 69 MMHG | SYSTOLIC BLOOD PRESSURE: 105 MMHG

## 2025-02-27 DIAGNOSIS — K35.32 PERFORATED APPENDICITIS: Primary | ICD-10-CM

## 2025-02-27 PROCEDURE — 710N000010 HC RECOVERY PHASE 1, LEVEL 2, PER MIN: Performed by: SURGERY

## 2025-02-27 PROCEDURE — 88304 TISSUE EXAM BY PATHOLOGIST: CPT | Mod: TC | Performed by: SURGERY

## 2025-02-27 PROCEDURE — 250N000011 HC RX IP 250 OP 636: Performed by: SURGERY

## 2025-02-27 PROCEDURE — 250N000009 HC RX 250: Performed by: SURGERY

## 2025-02-27 PROCEDURE — 272N000001 HC OR GENERAL SUPPLY STERILE: Performed by: SURGERY

## 2025-02-27 PROCEDURE — 250N000009 HC RX 250: Performed by: ANESTHESIOLOGY

## 2025-02-27 PROCEDURE — 258N000003 HC RX IP 258 OP 636: Performed by: ANESTHESIOLOGY

## 2025-02-27 PROCEDURE — 99203 OFFICE O/P NEW LOW 30 MIN: CPT | Mod: 57 | Performed by: SURGERY

## 2025-02-27 PROCEDURE — 370N000017 HC ANESTHESIA TECHNICAL FEE, PER MIN: Performed by: SURGERY

## 2025-02-27 PROCEDURE — 250N000011 HC RX IP 250 OP 636: Performed by: ANESTHESIOLOGY

## 2025-02-27 PROCEDURE — 44970 LAPAROSCOPY APPENDECTOMY: CPT | Performed by: SURGERY

## 2025-02-27 PROCEDURE — 88304 TISSUE EXAM BY PATHOLOGIST: CPT | Mod: 26 | Performed by: PATHOLOGY

## 2025-02-27 PROCEDURE — 999N000141 HC STATISTIC PRE-PROCEDURE NURSING ASSESSMENT: Performed by: SURGERY

## 2025-02-27 PROCEDURE — 258N000003 HC RX IP 258 OP 636: Performed by: SURGERY

## 2025-02-27 PROCEDURE — 250N000013 HC RX MED GY IP 250 OP 250 PS 637: Performed by: SURGERY

## 2025-02-27 PROCEDURE — 360N000076 HC SURGERY LEVEL 3, PER MIN: Performed by: SURGERY

## 2025-02-27 RX ORDER — FLUMAZENIL 0.1 MG/ML
0.2 INJECTION, SOLUTION INTRAVENOUS
Status: DISCONTINUED | OUTPATIENT
Start: 2025-02-27 | End: 2025-02-27 | Stop reason: HOSPADM

## 2025-02-27 RX ORDER — ACETAMINOPHEN 325 MG/1
650 TABLET ORAL EVERY 4 HOURS PRN
Status: ACTIVE | OUTPATIENT
Start: 2025-02-27

## 2025-02-27 RX ORDER — NALOXONE HYDROCHLORIDE 0.4 MG/ML
0.1 INJECTION, SOLUTION INTRAMUSCULAR; INTRAVENOUS; SUBCUTANEOUS
Status: DISCONTINUED | OUTPATIENT
Start: 2025-02-27 | End: 2025-02-27 | Stop reason: HOSPADM

## 2025-02-27 RX ORDER — HYDROCODONE BITARTRATE AND ACETAMINOPHEN 5; 325 MG/1; MG/1
1 TABLET ORAL EVERY 4 HOURS PRN
Status: DISPENSED | OUTPATIENT
Start: 2025-02-27

## 2025-02-27 RX ORDER — PROPOFOL 10 MG/ML
INJECTION, EMULSION INTRAVENOUS PRN
Status: DISCONTINUED | OUTPATIENT
Start: 2025-02-27 | End: 2025-02-27

## 2025-02-27 RX ORDER — NICOTINE 21 MG/24HR
1 PATCH, TRANSDERMAL 24 HOURS TRANSDERMAL EVERY 24 HOURS
Status: DISCONTINUED | OUTPATIENT
Start: 2025-02-27 | End: 2025-02-27

## 2025-02-27 RX ORDER — PIPERACILLIN SODIUM, TAZOBACTAM SODIUM 3; .375 G/15ML; G/15ML
3.38 INJECTION, POWDER, LYOPHILIZED, FOR SOLUTION INTRAVENOUS EVERY 8 HOURS
Status: ACTIVE | OUTPATIENT
Start: 2025-02-28

## 2025-02-27 RX ORDER — NALOXONE HYDROCHLORIDE 0.4 MG/ML
0.4 INJECTION, SOLUTION INTRAMUSCULAR; INTRAVENOUS; SUBCUTANEOUS
Status: ACTIVE | OUTPATIENT
Start: 2025-02-27

## 2025-02-27 RX ORDER — PIPERACILLIN SODIUM, TAZOBACTAM SODIUM 3; .375 G/15ML; G/15ML
3.38 INJECTION, POWDER, LYOPHILIZED, FOR SOLUTION INTRAVENOUS EVERY 8 HOURS
Status: DISCONTINUED | OUTPATIENT
Start: 2025-02-27 | End: 2025-02-27

## 2025-02-27 RX ORDER — VENLAFAXINE HYDROCHLORIDE 75 MG/1
75 CAPSULE, EXTENDED RELEASE ORAL EVERY EVENING
Status: DISPENSED | OUTPATIENT
Start: 2025-02-27

## 2025-02-27 RX ORDER — ACETAMINOPHEN 650 MG/1
650 SUPPOSITORY RECTAL EVERY 4 HOURS PRN
Status: ACTIVE | OUTPATIENT
Start: 2025-02-27

## 2025-02-27 RX ORDER — LIDOCAINE HYDROCHLORIDE 10 MG/ML
INJECTION, SOLUTION INFILTRATION; PERINEURAL PRN
Status: DISCONTINUED | OUTPATIENT
Start: 2025-02-27 | End: 2025-02-27

## 2025-02-27 RX ORDER — LIDOCAINE 40 MG/G
CREAM TOPICAL
Status: DISCONTINUED | OUTPATIENT
Start: 2025-02-27 | End: 2025-02-27 | Stop reason: HOSPADM

## 2025-02-27 RX ORDER — ONDANSETRON 4 MG/1
4 TABLET, ORALLY DISINTEGRATING ORAL EVERY 30 MIN PRN
Status: DISCONTINUED | OUTPATIENT
Start: 2025-02-27 | End: 2025-02-27 | Stop reason: HOSPADM

## 2025-02-27 RX ORDER — HYDROMORPHONE HCL IN WATER/PF 6 MG/30 ML
0.4 PATIENT CONTROLLED ANALGESIA SYRINGE INTRAVENOUS EVERY 5 MIN PRN
Status: DISCONTINUED | OUTPATIENT
Start: 2025-02-27 | End: 2025-02-27 | Stop reason: HOSPADM

## 2025-02-27 RX ORDER — ONDANSETRON 2 MG/ML
4 INJECTION INTRAMUSCULAR; INTRAVENOUS EVERY 30 MIN PRN
Status: DISCONTINUED | OUTPATIENT
Start: 2025-02-27 | End: 2025-02-27 | Stop reason: HOSPADM

## 2025-02-27 RX ORDER — ARIPIPRAZOLE 5 MG/1
5 TABLET ORAL EVERY EVENING
Status: DISPENSED | OUTPATIENT
Start: 2025-02-27

## 2025-02-27 RX ORDER — SODIUM CHLORIDE, SODIUM LACTATE, POTASSIUM CHLORIDE, CALCIUM CHLORIDE 600; 310; 30; 20 MG/100ML; MG/100ML; MG/100ML; MG/100ML
INJECTION, SOLUTION INTRAVENOUS CONTINUOUS
Status: DISCONTINUED | OUTPATIENT
Start: 2025-02-27 | End: 2025-02-27 | Stop reason: HOSPADM

## 2025-02-27 RX ORDER — VENLAFAXINE HYDROCHLORIDE 75 MG/1
75 CAPSULE, EXTENDED RELEASE ORAL
Status: DISCONTINUED | OUTPATIENT
Start: 2025-02-28 | End: 2025-02-27

## 2025-02-27 RX ORDER — NALOXONE HYDROCHLORIDE 0.4 MG/ML
0.2 INJECTION, SOLUTION INTRAMUSCULAR; INTRAVENOUS; SUBCUTANEOUS
Status: ACTIVE | OUTPATIENT
Start: 2025-02-27

## 2025-02-27 RX ORDER — DEXAMETHASONE SODIUM PHOSPHATE 4 MG/ML
4 INJECTION, SOLUTION INTRA-ARTICULAR; INTRALESIONAL; INTRAMUSCULAR; INTRAVENOUS; SOFT TISSUE
Status: DISCONTINUED | OUTPATIENT
Start: 2025-02-27 | End: 2025-02-27 | Stop reason: HOSPADM

## 2025-02-27 RX ORDER — DEXAMETHASONE SODIUM PHOSPHATE 4 MG/ML
INJECTION, SOLUTION INTRA-ARTICULAR; INTRALESIONAL; INTRAMUSCULAR; INTRAVENOUS; SOFT TISSUE PRN
Status: DISCONTINUED | OUTPATIENT
Start: 2025-02-27 | End: 2025-02-27

## 2025-02-27 RX ORDER — ONDANSETRON 2 MG/ML
INJECTION INTRAMUSCULAR; INTRAVENOUS PRN
Status: DISCONTINUED | OUTPATIENT
Start: 2025-02-27 | End: 2025-02-27

## 2025-02-27 RX ORDER — LEVONORGESTREL AND ETHINYL ESTRADIOL 0.15-0.03
1 KIT ORAL EVERY EVENING
Status: ACTIVE | OUTPATIENT
Start: 2025-02-28

## 2025-02-27 RX ORDER — IBUPROFEN 600 MG/1
600 TABLET, FILM COATED ORAL EVERY 6 HOURS PRN
Status: ACTIVE | OUTPATIENT
Start: 2025-02-27

## 2025-02-27 RX ORDER — FENTANYL CITRATE 50 UG/ML
50 INJECTION, SOLUTION INTRAMUSCULAR; INTRAVENOUS EVERY 5 MIN PRN
Status: DISCONTINUED | OUTPATIENT
Start: 2025-02-27 | End: 2025-02-27

## 2025-02-27 RX ORDER — BUPIVACAINE HYDROCHLORIDE AND EPINEPHRINE 2.5; 5 MG/ML; UG/ML
INJECTION, SOLUTION INFILTRATION; PERINEURAL PRN
Status: DISCONTINUED | OUTPATIENT
Start: 2025-02-27 | End: 2025-02-27 | Stop reason: HOSPADM

## 2025-02-27 RX ORDER — VENLAFAXINE HYDROCHLORIDE 150 MG/1
150 CAPSULE, EXTENDED RELEASE ORAL EVERY EVENING
Status: DISPENSED | OUTPATIENT
Start: 2025-02-27

## 2025-02-27 RX ORDER — SODIUM CHLORIDE, SODIUM LACTATE, POTASSIUM CHLORIDE, AND CALCIUM CHLORIDE .6; .31; .03; .02 G/100ML; G/100ML; G/100ML; G/100ML
IRRIGANT IRRIGATION PRN
Status: DISCONTINUED | OUTPATIENT
Start: 2025-02-27 | End: 2025-02-27 | Stop reason: HOSPADM

## 2025-02-27 RX ORDER — CEFAZOLIN SODIUM/WATER 2 G/20 ML
2 SYRINGE (ML) INTRAVENOUS
Status: COMPLETED | OUTPATIENT
Start: 2025-02-27 | End: 2025-02-27

## 2025-02-27 RX ORDER — KETOROLAC TROMETHAMINE 30 MG/ML
INJECTION, SOLUTION INTRAMUSCULAR; INTRAVENOUS PRN
Status: DISCONTINUED | OUTPATIENT
Start: 2025-02-27 | End: 2025-02-27

## 2025-02-27 RX ORDER — PROPRANOLOL HYDROCHLORIDE 10 MG/1
10 TABLET ORAL 2 TIMES DAILY PRN
Status: ACTIVE | OUTPATIENT
Start: 2025-02-27

## 2025-02-27 RX ORDER — HYDROMORPHONE HCL IN WATER/PF 6 MG/30 ML
0.2 PATIENT CONTROLLED ANALGESIA SYRINGE INTRAVENOUS EVERY 5 MIN PRN
Status: DISCONTINUED | OUTPATIENT
Start: 2025-02-27 | End: 2025-02-27 | Stop reason: HOSPADM

## 2025-02-27 RX ORDER — FENTANYL CITRATE 50 UG/ML
25-100 INJECTION, SOLUTION INTRAMUSCULAR; INTRAVENOUS
Status: DISCONTINUED | OUTPATIENT
Start: 2025-02-27 | End: 2025-02-27 | Stop reason: HOSPADM

## 2025-02-27 RX ORDER — PROPOFOL 10 MG/ML
INJECTION, EMULSION INTRAVENOUS CONTINUOUS PRN
Status: DISCONTINUED | OUTPATIENT
Start: 2025-02-27 | End: 2025-02-27

## 2025-02-27 RX ORDER — PIPERACILLIN SODIUM, TAZOBACTAM SODIUM 3; .375 G/15ML; G/15ML
3.38 INJECTION, POWDER, LYOPHILIZED, FOR SOLUTION INTRAVENOUS ONCE
Status: COMPLETED | OUTPATIENT
Start: 2025-02-27 | End: 2025-02-27

## 2025-02-27 RX ORDER — HYDROCODONE BITARTRATE AND ACETAMINOPHEN 5; 325 MG/1; MG/1
1 TABLET ORAL EVERY 6 HOURS PRN
Qty: 10 TABLET | Refills: 0 | OUTPATIENT
Start: 2025-02-27

## 2025-02-27 RX ORDER — DOCUSATE SODIUM 100 MG/1
100 CAPSULE, LIQUID FILLED ORAL AT BEDTIME
Status: DISPENSED | OUTPATIENT
Start: 2025-02-27

## 2025-02-27 RX ORDER — CEFAZOLIN SODIUM/WATER 2 G/20 ML
2 SYRINGE (ML) INTRAVENOUS SEE ADMIN INSTRUCTIONS
Status: DISCONTINUED | OUTPATIENT
Start: 2025-02-27 | End: 2025-02-27 | Stop reason: HOSPADM

## 2025-02-27 RX ORDER — VENLAFAXINE HYDROCHLORIDE 150 MG/1
150 CAPSULE, EXTENDED RELEASE ORAL
Status: DISCONTINUED | OUTPATIENT
Start: 2025-02-28 | End: 2025-02-27

## 2025-02-27 RX ORDER — FENTANYL CITRATE 50 UG/ML
25 INJECTION, SOLUTION INTRAMUSCULAR; INTRAVENOUS EVERY 5 MIN PRN
Status: DISCONTINUED | OUTPATIENT
Start: 2025-02-27 | End: 2025-02-27

## 2025-02-27 RX ORDER — FENTANYL CITRATE 50 UG/ML
INJECTION, SOLUTION INTRAMUSCULAR; INTRAVENOUS PRN
Status: DISCONTINUED | OUTPATIENT
Start: 2025-02-27 | End: 2025-02-27

## 2025-02-27 RX ADMIN — ROCURONIUM BROMIDE 20 MG: 10 INJECTION, SOLUTION INTRAVENOUS at 18:27

## 2025-02-27 RX ADMIN — DEXMEDETOMIDINE HYDROCHLORIDE 12 MCG: 100 INJECTION, SOLUTION INTRAVENOUS at 18:09

## 2025-02-27 RX ADMIN — FENTANYL CITRATE 100 MCG: 50 INJECTION INTRAMUSCULAR; INTRAVENOUS at 17:51

## 2025-02-27 RX ADMIN — ARIPIPRAZOLE 5 MG: 5 TABLET ORAL at 22:16

## 2025-02-27 RX ADMIN — PIPERACILLIN AND TAZOBACTAM 3.38 G: 3; .375 INJECTION, POWDER, FOR SOLUTION INTRAVENOUS at 22:10

## 2025-02-27 RX ADMIN — HYDROCODONE BITARTRATE AND ACETAMINOPHEN 1 TABLET: 5; 325 TABLET ORAL at 21:08

## 2025-02-27 RX ADMIN — VENLAFAXINE HYDROCHLORIDE 150 MG: 150 CAPSULE, EXTENDED RELEASE ORAL at 22:16

## 2025-02-27 RX ADMIN — MIDAZOLAM 2 MG: 1 INJECTION INTRAMUSCULAR; INTRAVENOUS at 17:41

## 2025-02-27 RX ADMIN — LIDOCAINE HYDROCHLORIDE 3 ML: 10 INJECTION, SOLUTION INFILTRATION; PERINEURAL at 17:51

## 2025-02-27 RX ADMIN — SODIUM CHLORIDE, POTASSIUM CHLORIDE, SODIUM LACTATE AND CALCIUM CHLORIDE: 600; 310; 30; 20 INJECTION, SOLUTION INTRAVENOUS at 18:31

## 2025-02-27 RX ADMIN — ONDANSETRON 4 MG: 2 INJECTION INTRAMUSCULAR; INTRAVENOUS at 17:51

## 2025-02-27 RX ADMIN — VENLAFAXINE HYDROCHLORIDE 75 MG: 75 CAPSULE, EXTENDED RELEASE ORAL at 22:16

## 2025-02-27 RX ADMIN — KETOROLAC TROMETHAMINE 15 MG: 30 INJECTION, SOLUTION INTRAMUSCULAR at 18:46

## 2025-02-27 RX ADMIN — DOCUSATE SODIUM 100 MG: 100 CAPSULE, LIQUID FILLED ORAL at 22:15

## 2025-02-27 RX ADMIN — PROPOFOL 110 MG: 10 INJECTION, EMULSION INTRAVENOUS at 17:51

## 2025-02-27 RX ADMIN — DEXMEDETOMIDINE HYDROCHLORIDE 8 MCG: 100 INJECTION, SOLUTION INTRAVENOUS at 18:29

## 2025-02-27 RX ADMIN — SODIUM CHLORIDE, POTASSIUM CHLORIDE, SODIUM LACTATE AND CALCIUM CHLORIDE: 600; 310; 30; 20 INJECTION, SOLUTION INTRAVENOUS at 16:11

## 2025-02-27 RX ADMIN — DEXAMETHASONE SODIUM PHOSPHATE 4 MG: 4 INJECTION, SOLUTION INTRA-ARTICULAR; INTRALESIONAL; INTRAMUSCULAR; INTRAVENOUS; SOFT TISSUE at 18:04

## 2025-02-27 RX ADMIN — Medication 200 MG: at 18:47

## 2025-02-27 RX ADMIN — PROPOFOL 150 MCG/KG/MIN: 10 INJECTION, EMULSION INTRAVENOUS at 17:51

## 2025-02-27 RX ADMIN — DEXAMETHASONE SODIUM PHOSPHATE 4 MG: 4 INJECTION, SOLUTION INTRA-ARTICULAR; INTRALESIONAL; INTRAMUSCULAR; INTRAVENOUS; SOFT TISSUE at 17:51

## 2025-02-27 RX ADMIN — ROCURONIUM BROMIDE 30 MG: 10 INJECTION, SOLUTION INTRAVENOUS at 17:51

## 2025-02-27 RX ADMIN — Medication 2 G: at 17:41

## 2025-02-27 ASSESSMENT — ACTIVITIES OF DAILY LIVING (ADL)
ADLS_ACUITY_SCORE: 46
ADLS_ACUITY_SCORE: 46
ADLS_ACUITY_SCORE: 41

## 2025-02-27 NOTE — ANESTHESIA PREPROCEDURE EVALUATION
Anesthesia Pre-Procedure Evaluation    Patient: Ashleigh Bustillo   MRN: [id number] : 2000        Procedure : Procedure(s):  APPENDECTOMY, LAPAROSCOPIC          Past Medical History:   Diagnosis Date    Anorexia 2022    Anxiety     Depressive disorder     Mild episode of recurrent major depressive disorder     Suicide attempt (H)     at least once, hospitalized      History reviewed. No pertinent surgical history.   No Known Allergies   Social History     Tobacco Use    Smoking status: Never     Passive exposure: Current    Smokeless tobacco: Never    Tobacco comments:     Daily vaping now   Substance Use Topics    Alcohol use: Yes     Comment: once a month drinks 1-2      Wt Readings from Last 1 Encounters:   25 56.7 kg (125 lb)        Anesthesia Evaluation            ROS/MED HX  ENT/Pulmonary:  - neg pulmonary ROS     Neurologic:  - neg neurologic ROS     Cardiovascular:  - neg cardiovascular ROS     METS/Exercise Tolerance:     Hematologic:  - neg hematologic  ROS     Musculoskeletal:  - neg musculoskeletal ROS     GI/Hepatic:  - neg GI/hepatic ROS     Renal/Genitourinary:  - neg Renal ROS     Endo:  - neg endo ROS     Psychiatric/Substance Use:  - neg psychiatric ROS   (+) psychiatric history (H/O suicide attempt) depression, anxiety and other (comment)       Infectious Disease:  - neg infectious disease ROS     Malignancy:  - neg malignancy ROS     Other:  - neg other ROS          Physical Exam    Airway  airway exam normal      Mallampati: II   TM distance: > 3 FB   Neck ROM: full   Mouth opening: > 3 cm    Respiratory Devices and Support         Dental  no notable dental history         Cardiovascular   cardiovascular exam normal       Rhythm and rate: regular     Pulmonary   pulmonary exam normal        breath sounds clear to auscultation           OUTSIDE LABS:  CBC:   Lab Results   Component Value Date    WBC 6.4 2022    WBC 9.9 2020    HGB 13.1 2022    HGB 14.1 2021     "HCT 38.5 04/08/2022    HCT 42.5 02/11/2020     04/08/2022     02/11/2020     BMP:   Lab Results   Component Value Date     04/08/2022     08/13/2021    POTASSIUM 3.9 04/08/2022    POTASSIUM 3.8 08/13/2021    CHLORIDE 107 04/08/2022    CHLORIDE 104 08/13/2021    CO2 21 (L) 04/08/2022    CO2 22 08/13/2021    BUN 8 04/08/2022    BUN 6 (L) 08/13/2021    CR 0.64 04/08/2022    CR 0.68 08/13/2021    GLC 77 04/08/2022    GLC 76 08/13/2021     COAGS: No results found for: \"PTT\", \"INR\", \"FIBR\"  POC:   Lab Results   Component Value Date    HCG Negative 02/11/2020     HEPATIC:   Lab Results   Component Value Date    ALBUMIN 3.9 04/08/2022    PROTTOTAL 7.5 04/08/2022    ALT 10 04/08/2022    AST 14 04/08/2022    ALKPHOS 59 04/08/2022    BILITOTAL 0.6 04/08/2022     OTHER:   Lab Results   Component Value Date    SUE 9.0 04/08/2022    TSH 0.90 08/12/2024    CRP 0.1 04/08/2022    SED 18 04/08/2022       Anesthesia Plan    ASA Status:  2    NPO Status:  Will be NPO Appropriate at ... 2/27/2025 5:30 PM   Anesthesia Type: General.     - Airway: ETT   Induction: Intravenous, RSI.   Maintenance: TIVA.        Consents    Anesthesia Plan(s) and associated risks, benefits, and realistic alternatives discussed. Questions answered and patient/representative(s) expressed understanding.     - Discussed: Risks, Benefits and Alternatives for BOTH SEDATION and the PROCEDURE were discussed     - Discussed with:  Patient      - Extended Intubation/Ventilatory Support Discussed: No.           Postoperative Care    Pain management: IV analgesics.   PONV prophylaxis: Ondansetron (or other 5HT-3), Dexamethasone or Solumedrol     Comments:               Ketan Dudley MD    I have reviewed the pertinent notes and labs in the chart from the past 30 days and (re)examined the patient.  Any updates or changes from those notes are reflected in this note.    Clinically Significant Risk Factors Present on Admission                   "

## 2025-02-27 NOTE — H&P
HPI  24 year old year old adult who I have been consulted by No ref. provider found for evaluation of No chief complaint on file.  24-year-old female presented with several days of periumbilical pain with migration to the right lower quadrant.  She had said some mild nausea but denies any fevers or chills or emesis.  She has a history of anxiety and depression but is otherwise healthy.  She presented to the emergency room and underwent CT imaging which demonstrated acute appendicitis.  She was transferred to [facility] for definitive care.      Allergies:Patient has no known allergies.    Past Medical History:   Diagnosis Date    Anorexia 03/2022    Anxiety     Depressive disorder     Mild episode of recurrent major depressive disorder     Suicide attempt (H)     at least once, hospitalized       No past surgical history on file.      CURRENT MEDS:  No current facility-administered medications for this encounter.    FAMX-reviewed     reports that Ashleigh has never smoked. Ashleigh has been exposed to tobacco smoke. Ashleigh has never used smokeless tobacco. Ashleigh reports current alcohol use. Ashleigh reports current drug use. Drug: Marijuana.    Review of Systems:  The 12 point review of systems  is within normal limits except for as mentioned above in the HPI.  General ROS: No complaints or constitutional symptoms  Ophthalmic ROS: No complaints of visual changes  Skin: No complaints or symptoms   Endocrine: No complaints or symptoms  Hematologic/Lymphatic: No symptoms or complaints  Psychiatric: No symptoms or complaints  Respiratory ROS: no cough, shortness of breath, or wheezing  Cardiovascular ROS: no chest pain or dyspnea on exertion  Gastrointestinal ROS: As per HPI  Genito-Urinary ROS: no dysuria, trouble voiding, or hematuria  Musculoskeletal ROS: no joint or muscle pain  Neurological ROS: no TIA or stroke symptoms      EXAM:  There were no vitals taken for this visit.  GENERAL: Well developed adult, No acute  "distress, pleasant and conversant   EYES: Pupils equal, round and reactive, no scleral icterus  ABDOMEN: Soft, tender to palpation in the right lower quadrant with no Rovsing sign  Lungs-clear to auscultation bilaterally  Cardiac-regular rate and rhythm  SKIN: Pink, warm and dry, no obvious rashes or lesions   NEURO:No focal deficits, ambulatory  MUSCULOSKELETAL:No obvious deformities, no swelling, normal appearing      LABS:  Lab Results   Component Value Date    WBC 6.4 04/08/2022    HGB 13.1 04/08/2022    HCT 38.5 04/08/2022    MCV 88 04/08/2022     04/08/2022     INR/Prothrombin Time  No results for input(s): \"NA\", \"CO2\", \"BUN\", \"CREATININE\", \"GLUCOSE\" in the last 168 hours.    Invalid input(s): \"K\", \"CL\", \"LABGLOM\", \"CALCIUM\"  Lab Results   Component Value Date    ALT 10 04/08/2022    AST 14 04/08/2022    ALKPHOS 59 04/08/2022       IMAGES:   Relevant images were reviewed and discussed with the patient.  Notable findings were: CT report per emergency room was acute appendicitis uncomplicated    Assessment/Plan:   Ashleigh Bustillo is a 24 year old adult with acute appendicitis.  Pathophysiology of appendicitis including surgical versus nonoperative management strategies were discussed.  Risk and benefits of surgery were explained as well.  The plan was originally to get her into the operating room immediately for surgery.  However she ate at approximately 11 AM this morning and we are obligated to wait 6 hours per the anesthesia rule.  Plan will be for laparoscopic appendectomy by Dr. Renteria later this evening.      Glen Fay D.O., FACS  [phone]  "

## 2025-02-27 NOTE — OP NOTE
Name:  Ashleigh Bustillo  PCP:  Santo Escamilla  Procedure Date:  2/27/2025      LAPAROSCOPIC APPENDECTOMY      Pre-Procedure Diagnosis:  Acute appendicitis    Post-Procedure Diagnosis:    Acute perforated appendicitis with abscess    Surgeon:  Margie Renteria DO    Anesthesia Type:    General    Estimated Blood Loss:   40 mL    Specimens:    Appendix    Complications:    None apparent    Indication for procedure:  This is a 24-year-old who developed abdominal pain a couple days ago.  It started out centrally and has now localized to the right lower quadrant.  They proceeded to the urgent care where a CT of the abdomen and pelvis was obtained.  It demonstrated findings consistent with acute uncomplicated appendicitis.  They were then transferred to [facility] for definitive management.    Operative Report:    After informed consent was obtained, and the risks and benefits of the procedure were discussed, the patient was brought to the operating room and placed in the supine position.  General anesthesia with endotracheal intubation was performed by the department of anesthesia.  The patient was then prepped and draped in the standard sterile fashion.  A time out was performed and perioperative antibiotics were administered.  0.25% Marcaine with epinephrine was injected at the skin just superior and lateral to the umbilicus.  This was then sharply incised. The abdomen was then entered using a 5 mm optical trocar.  Pneumoperitoneum was achieved, which the patient tolerated well. On initial survey of the abdominal cavity no underlying injury was noted was noted.  The patient was then placed in the Trendelenburg position with the right side up.  2 additional trocars were then placed in the suprapubic region and left lower quadrant after injection of local anesthetic.  The abdomen was further explored.  The appendix was found retrocecally and tracking cephalad deep to the cecum with the tip at the right colon.  The  appendix was significantly scarred in from chronic inflammation.  It also appeared acutely inflamed with a perforation.  As the appendix had been brought into view there was a pocket of purulent fluid followed by stool consistent with rupture.  The appendix was dissected free from the surrounding tissues.  It was completely peeled away from the mesoappendix.  The base of the appendix was identified and dilated.  A 45 mm blue load endoscopic stapler was inserted.  The appendix was divided at its base against the cecum.  The appendix was then placed into an Endo Catch bag and removed.  The abdomen was surveyed for hemostasis and aspirated clear of all fluid.  A 19 Serbian Reinier drain was obtained and exited out the suprapubic port site.  It was secured to the skin with 2-0 nylon.  The fascia of the left lower quadrant incision site was closed with 0 Vicryl.   Insufflation was released and the remaining ports were removed.  The skin was closed with interrupted 4-0 Monocryl, followed by Benzoin, steri-strips and a sterile bandage.    Disposition:  The patient tolerated the procedure well.  She will be admitted to the hospital to continue IV antibiotics and monitor her drain.  If she is improved tomorrow, she can be discharged home on oral antibiotics and with her drain.    Margie Renteria DO  General Surgeon  [facility]  Surgery [address]  [address]  [place] [address]  Office: [phone]  Employed by - [facility]

## 2025-02-28 PROCEDURE — 250N000011 HC RX IP 250 OP 636: Performed by: SURGERY

## 2025-02-28 PROCEDURE — 250N000013 HC RX MED GY IP 250 OP 250 PS 637: Performed by: SURGERY

## 2025-02-28 RX ADMIN — DOCUSATE SODIUM 100 MG: 100 CAPSULE, LIQUID FILLED ORAL at 20:58

## 2025-02-28 RX ADMIN — ARIPIPRAZOLE 5 MG: 5 TABLET ORAL at 20:58

## 2025-02-28 RX ADMIN — HYDROCODONE BITARTRATE AND ACETAMINOPHEN 1 TABLET: 5; 325 TABLET ORAL at 17:29

## 2025-02-28 RX ADMIN — VENLAFAXINE HYDROCHLORIDE 150 MG: 150 CAPSULE, EXTENDED RELEASE ORAL at 20:59

## 2025-02-28 RX ADMIN — VENLAFAXINE HYDROCHLORIDE 75 MG: 75 CAPSULE, EXTENDED RELEASE ORAL at 21:01

## 2025-02-28 RX ADMIN — HYDROCODONE BITARTRATE AND ACETAMINOPHEN 1 TABLET: 5; 325 TABLET ORAL at 10:37

## 2025-02-28 RX ADMIN — PIPERACILLIN AND TAZOBACTAM 3.38 G: 3; .375 INJECTION, POWDER, FOR SOLUTION INTRAVENOUS at 10:38

## 2025-02-28 RX ADMIN — PIPERACILLIN AND TAZOBACTAM 3.38 G: 3; .375 INJECTION, POWDER, FOR SOLUTION INTRAVENOUS at 17:29

## 2025-02-28 RX ADMIN — HYDROCODONE BITARTRATE AND ACETAMINOPHEN 1 TABLET: 5; 325 TABLET ORAL at 06:01

## 2025-02-28 RX ADMIN — PIPERACILLIN AND TAZOBACTAM 3.38 G: 3; .375 INJECTION, POWDER, FOR SOLUTION INTRAVENOUS at 06:01

## 2025-02-28 ASSESSMENT — ACTIVITIES OF DAILY LIVING (ADL)
ADLS_ACUITY_SCORE: 51
ADLS_ACUITY_SCORE: 46
ADLS_ACUITY_SCORE: 47
ADLS_ACUITY_SCORE: 51
ADLS_ACUITY_SCORE: 47
ADLS_ACUITY_SCORE: 47
ADLS_ACUITY_SCORE: 51
ADLS_ACUITY_SCORE: 51
ADLS_ACUITY_SCORE: 50
ADLS_ACUITY_SCORE: 51
ADLS_ACUITY_SCORE: 50
ADLS_ACUITY_SCORE: 51
ADLS_ACUITY_SCORE: 47
ADLS_ACUITY_SCORE: 51
ADLS_ACUITY_SCORE: 47
ADLS_ACUITY_SCORE: 50
ADLS_ACUITY_SCORE: 46
ADLS_ACUITY_SCORE: 47
ADLS_ACUITY_SCORE: 50

## 2025-02-28 NOTE — PROGRESS NOTES
General Surgery Progress Note:    Hospital Day # 0    ASSESSMENT:  No diagnosis found.    Ashleigh Bustillo is a 24 year old adult who is s/p laparoscopic appendectomy with drain placement on 2/27/2025 for perforated appendicitis with abscess. Patient progressing well post-operatively. Pain is controlled with PO pain medications. Tolerating regular diet with no nausea or vomiting. Does not think she is passing much gas and no BMs since surgery. Drain with red/brown murky output and will discharge home with drain in place. Will re-evaluate later today for possible discharge if feeling well and passing gas.    PLAN:  - Regular diet  - PO pain control  - Encourage activity and ambulation to promote bowel function  - Drain cares x 1, will go home with drain in place  - Continue IV abx, transition to PO abx for 5 days at discharge  - Possible discharge later today    Addendum 1500 - Continues to have abdominal pain rating it 5/10 and is somewhat tolerable with current PO pain medications. Is not passing gas and no BMs recently. Tolerating a diet with no nausea or vomiting. Abdomen also continues to be mildly distended on exam. Discussed likely discharge tomorrow if passing gas and pain adequately controlled. Will discharge with drain in place and follow-up scheduled.    SUBJECTIVE:   Ashleigh is feeling well this morning. Reports abdominal pain especially around her drain site which is tolerable with oral pain medications. Tolerating a regular diet with no nausea or vomiting. States she had [employer] yesterday for dinner. Voiding with no issues. Does not know if she is passing much gas and no BM since surgery. Did ambulate in the hallway earlier this morning with plans to increase go on more walks today. Denies fever, chills.    Patient Vitals for the past 24 hrs:   BP Temp Temp src Pulse Resp SpO2 Height Weight   02/28/25 0744 104/63 98.6  F (37  C) Oral 79 16 96 % -- --   02/28/25 0600 -- 98.6  F (37  C) Oral 78 -- 95 % -- --  "  02/28/25 0403 101/53 98.4  F (36.9  C) Oral 82 18 95 % -- 56.9 kg (125 lb 8 oz)   02/28/25 0330 102/61 -- -- 72 -- 94 % -- --   02/28/25 0300 98/58 -- -- 71 -- 96 % -- --   02/28/25 0230 102/61 -- -- 69 -- 95 % -- --   02/28/25 0200 105/62 98.6  F (37  C) Oral 73 -- 94 % -- --   02/28/25 0130 101/58 -- -- 64 -- 95 % -- --   02/28/25 0100 109/61 -- -- 77 -- 95 % -- --   02/28/25 0030 99/59 -- -- 80 -- 95 % -- --   02/28/25 0015 104/57 -- -- 77 -- 96 % -- --   02/28/25 0000 111/59 98.1  F (36.7  C) Oral 79 -- 94 % -- --   02/27/25 2345 100/58 -- -- 79 -- 94 % -- --   02/27/25 2330 104/57 -- -- 74 -- 95 % -- --   02/27/25 2315 105/60 -- -- 70 -- 95 % -- --   02/27/25 2300 106/72 -- -- 75 -- 96 % -- --   02/27/25 2245 105/71 -- -- 82 -- 95 % -- --   02/27/25 2230 114/70 -- -- 83 -- 96 % -- --   02/27/25 2215 110/69 -- -- 82 -- 96 % -- --   02/27/25 2200 105/69 98.7  F (37.1  C) Axillary 90 -- 96 % -- --   02/27/25 2146 109/66 -- -- 76 -- 96 % -- --   02/27/25 2145 -- -- -- 71 -- 96 % -- --   02/27/25 2130 118/63 -- -- -- -- -- -- --   02/27/25 2115 114/70 -- -- 82 -- 97 % -- --   02/27/25 2100 112/58 -- -- 70 -- 96 % -- --   02/27/25 2045 108/59 -- -- 80 -- 95 % -- --   02/27/25 2030 110/62 -- -- 76 -- 95 % -- --   02/27/25 2015 109/58 -- -- -- -- -- -- --   02/27/25 2000 105/61 96.8  F (36  C) -- 82 17 94 % -- --   02/27/25 1950 106/59 (!) 96.4  F (35.8  C) -- 88 21 93 % -- --   02/27/25 1940 105/57 (!) 96.6  F (35.9  C) -- 82 17 94 % -- --   02/27/25 1930 103/57 97  F (36.1  C) -- 82 18 96 % -- --   02/27/25 1920 104/59 97.2  F (36.2  C) -- 83 17 96 % -- --   02/27/25 1910 110/59 97.2  F (36.2  C) -- 81 16 97 % -- --   02/27/25 1900 -- 97.5  F (36.4  C) Core 82 26 99 % -- --   02/27/25 1859 111/59 -- -- 86 14 99 % -- --   02/27/25 1512 122/80 97.9  F (36.6  C) Core 92 14 97 % 1.575 m (5' 2\") 56.7 kg (125 lb)     PHYSICAL EXAM:  General: patient seen resting in bed, no acute distress  Resp: no respiratory distress, " breathing comfortably on RA  Abdomen: soft, mildly distended, appropriately tender postoperatively around incision and drain site. Incisions clean, dry, intact with Steri-Strips and Band-Aids  Drains: Bulb on suction with red/brown murky output.  Output by Drain (mL) 02/26/25 0700 - 02/26/25 1459 02/26/25 1500 - 02/26/25 2259 02/26/25 2300 - 02/27/25 0659 02/27/25 0700 - 02/27/25 1459 02/27/25 1500 - 02/27/25 2259 02/27/25 2300 - 02/28/25 0659 02/28/25 0700 - 02/28/25 1148   Drain Closed/Suction 1 Inferior;Midline Abdomen Bulb 19 Romanian Placed in port site     80 30       Extremities: warm and well perfused    02/27 0700 - 02/28 0659  In: 1970 [P.O.:470; I.V.:1400]  Out: 910 [Urine:800; Drains:110]    No visits with results within 2 Day(s) from this visit.   Latest known visit with results is:   Office Visit on 08/12/2024   Component Date Value    HIV Antigen Antibody Com* 08/12/2024 Nonreactive     Hepatitis C Antibody 08/12/2024 Nonreactive     Chlamydia Trachomatis 08/12/2024 Negative     Neisseria gonorrhoeae 08/12/2024 Negative     TSH 08/12/2024 0.90         Elsie Sevilla PA-C  [facility] General Surgery  [facility] [address]

## 2025-02-28 NOTE — ANESTHESIA POSTPROCEDURE EVALUATION
Patient: Ashleigh Bustillo    Procedure: Procedure(s):  APPENDECTOMY, LAPAROSCOPIC       Anesthesia Type:  General    Note:  Disposition: Outpatient   Postop Pain Control: Uneventful            Sign Out: Well controlled pain   PONV: No   Neuro/Psych: Uneventful            Sign Out: Acceptable/Baseline neuro status   Airway/Respiratory: Uneventful            Sign Out: Acceptable/Baseline resp. status   CV/Hemodynamics: Uneventful            Sign Out: Acceptable CV status; No obvious hypovolemia; No obvious fluid overload   Other NRE: NONE   DID A NON-ROUTINE EVENT OCCUR?            Last vitals:  Vitals Value Taken Time   /57 02/27/25 1940   Temp 35.9  C (96.62  F) 02/27/25 1948   Pulse 91 02/27/25 1948   Resp 21 02/27/25 1948   SpO2 94 % 02/27/25 1948   Vitals shown include unfiled device data.    Electronically Signed By: Ketan Dudley MD  February 27, 2025  7:50 PM

## 2025-02-28 NOTE — PLAN OF CARE
"  Problem: Suicide Risk  Goal: Absence of Self-Harm  Intervention: Assess Risk to Self and Maintain Safety     Goal Outcome Evaluation:      Spoke with patient one-to-one after seeing she scored as high suicide risk on C-SSRS Screen. Asked if she felt suicidal and she said she was not. Inquired about when she had been feeling suicidal over the past month, and she said that she has had \"two or three fleeting thoughts of suicide in the past month without having a plan or thinking more about it.\" She indicated that those thoughts occurred when she was not taking her medication. She is now on medication and she has not felt suicidal since then. Patient is just feeling anxious about the surgery, but she is handling it well. Allowed boyfriend to stay at bedside overnight as an emotional support. Updated charge nurse Ortiz Sotelo RN and decision made not to implement a 1:1 as patient is not currently feeling suicidal.           "

## 2025-02-28 NOTE — ANESTHESIA PROCEDURE NOTES
Airway       Patient location during procedure: OR       Procedure Start/Stop Times: 2/27/2025 5:53 PM  Staff -        CRNA: Supriya Daniels APRN CRNA       Performed By: CRNAIndications and Patient Condition       Indications for airway management: tavares-procedural       Induction type:intravenous       Mask difficulty assessment: 1 - vent by mask    Final Airway Details       Final airway type: endotracheal airway       Successful airway: ETT - single  Endotracheal Airway Details        ETT size (mm): 7.0       Cuffed: yes       Cuff volume (mL): 7       Successful intubation technique: direct laryngoscopy       DL Blade Type: Dumont 2       Grade View of Cords: 1       Adjucts: stylet       Position: Right       Measured from: lips       Secured at (cm): 19       Bite block used: None    Post intubation assessment        Placement verified by: capnometry, equal breath sounds and chest rise        Number of attempts at approach: 1       Number of other approaches attempted: 0       Secured with: tape       Ease of procedure: easy       Dentition: Intact and Unchanged       Dental guard used and removed. Dental Guard Type: Standard White.    Medication(s) Administered   Medication Administration Time: 2/27/2025 5:53 PM

## 2025-02-28 NOTE — MEDICATION SCRIBE - ADMISSION MEDICATION HISTORY
Medication Scribe Admission Medication History    Admission medication history is complete. The information provided in this note is only as accurate as the sources available at the time of the update.    Information Source(s): Patient and CareEverywhere/SureScripts via in-person    Pertinent Information: Patient reported they typically take medications daily at 1900. No medication taken today PTA and had missed yesterday evening's medications. Last dose taken was 2/25/2025 at 1900.     Not currently using smoking cessation aids or vitamin D. Removed these from the PTA list. Confirmed all other medications with the patient and their fill history.     Changes made to PTA medication list:  Added: None  Deleted:   Nicotine 14 mg/24 hr patch  Nicotine 2 mg gum  Vitamin D PO  Changed: None    Allergies reviewed with patient and updates made in EHR: yes    Medication History Completed By: Macario Milner 2/27/2025 8:56 PM    PTA Med List   Medication Sig Note Last Dose/Taking    ARIPiprazole (ABILIFY) 5 MG tablet TAKE 1 TABLET(5 MG) BY MOUTH DAILY  2/25/2025 at  7:00 PM    levonorgestrel-ethinyl estradiol (SEASONALE) 0.15-0.03 MG tablet Take 1 tablet by mouth daily.  2/25/2025 at  7:00 PM    naltrexone (DEPADE/REVIA) 50 MG tablet Take 1 tablet (50 mg) by mouth daily. Take 1/2 tablet daily for six days, then increase to one full tablet  2/25/2025 at  7:00 PM    propranolol (INDERAL) 10 MG tablet TAKE 1 TABLET(10 MG) BY MOUTH TWICE DAILY AS NEEDED FOR ANXIETY  2/25/2025 at  7:00 PM    venlafaxine (EFFEXOR XR) 150 MG 24 hr capsule TAKE 1 CAPSULE(150 MG) BY MOUTH DAILY 2/27/2025: 225 mg daily total dose 2/25/2025 at  7:00 PM    venlafaxine (EFFEXOR XR) 75 MG 24 hr capsule TAKE 1 CAPSULE(75 MG) BY MOUTH DAILY IN ADDITION TO EFFEXOR  MG MAKING A. TOTAL DAILY DOSE  MG  2/25/2025 at  7:00 PM

## 2025-02-28 NOTE — PLAN OF CARE
Problem: Adult Inpatient Plan of Care  Goal: Optimal Comfort and Wellbeing  Outcome: Progressing  Intervention: Monitor Pain and Promote Comfort  Recent Flowsheet Documentation  Taken 2/28/2025 0601 by Yvonne Barkley RN  Pain Management Interventions:   medication (see MAR)   cold applied  Taken 2/28/2025 0200 by Yvonne Barkley RN  Pain Management Interventions:   medication offered but refused   cold applied  Taken 2/28/2025 0000 by Yvonne Barkley RN  Pain Management Interventions: medication offered but refused  Taken 2/27/2025 2200 by Yvonne Barkley RN  Pain Management Interventions: medication offered but refused  Taken 2/27/2025 2108 by Yvonne Barkley RN  Pain Management Interventions:   medication (see MAR)   cold applied  Intervention: Provide Person-Centered Care  Recent Flowsheet Documentation  Taken 2/28/2025 0400 by Yvonne Barkley RN  Trust Relationship/Rapport:   care explained   choices provided   emotional support provided   empathic listening provided   questions answered   questions encouraged   reassurance provided   thoughts/feelings acknowledged  Taken 2/28/2025 0000 by Yvonne Barkley RN  Trust Relationship/Rapport:   care explained   choices provided   emotional support provided   empathic listening provided   questions answered   questions encouraged   reassurance provided   thoughts/feelings acknowledged  Taken 2/27/2025 2030 by Yvonne Barkley RN  Trust Relationship/Rapport:   care explained   choices provided   emotional support provided   empathic listening provided   questions answered   questions encouraged   reassurance provided   thoughts/feelings acknowledged     Problem: Suicide Risk  Goal: Absence of Self-Harm  Outcome: Progressing  Intervention: Assess Risk to Self and Maintain Safety  Recent Flowsheet Documentation  Taken 2/28/2025 0400 by Yvonne Barkley RN  Enhanced Safety Measures: room near unit station  Taken 2/28/2025 0000 by Yvonne Barkley  RN  Enhanced Safety Measures: room near unit station  Taken 2/27/2025 2200 by Yvonne Barkley RN  Self-Harm Prevention: (interventions not needed) other (see comments)  Taken 2/27/2025 2030 by Yvonne Barkley RN  Enhanced Safety Measures: room near unit station  Intervention: Promote Psychosocial Wellbeing  Recent Flowsheet Documentation  Taken 2/28/2025 0400 by Yvonne Barkley RN  Sleep/Rest Enhancement:   family presence promoted   regular sleep/rest pattern promoted   room darkened  Taken 2/28/2025 0000 by Yvonne Barkley RN  Sleep/Rest Enhancement:   family presence promoted   regular sleep/rest pattern promoted   room darkened  Taken 2/27/2025 2030 by Yvonne Barkley RN  Sleep/Rest Enhancement:   family presence promoted   regular sleep/rest pattern promoted   room darkened  Intervention: Establish Safety Plan and Continuity of Care  Recent Flowsheet Documentation  Taken 2/27/2025 2200 by Yvonne Barkley RN  Safe Transition Promotion: (not feeling suicidal since starting her medications) other (see comments)     Problem: Infection  Goal: Absence of Infection Signs and Symptoms  Outcome: Progressing     Problem: Pain Acute  Goal: Optimal Pain Control and Function  Outcome: Progressing  Intervention: Develop Pain Management Plan  Recent Flowsheet Documentation  Taken 2/28/2025 0601 by Yvonne Barkley RN  Pain Management Interventions:   medication (see MAR)   cold applied  Taken 2/28/2025 0200 by Yvonne Barkley RN  Pain Management Interventions:   medication offered but refused   cold applied  Taken 2/28/2025 0000 by Yvonne Barkley RN  Pain Management Interventions: medication offered but refused  Taken 2/27/2025 2200 by Yvonne Barkley RN  Pain Management Interventions: medication offered but refused  Taken 2/27/2025 2108 by Yvonne Barkley RN  Pain Management Interventions:   medication (see MAR)   cold applied  Intervention: Prevent or Manage Pain  Recent Flowsheet Documentation  Taken  2/28/2025 0400 by Yvonne Barkley RN  Sleep/Rest Enhancement:   family presence promoted   regular sleep/rest pattern promoted   room darkened  Medication Review/Management: medications reviewed  Taken 2/28/2025 0000 by Yvonne Barkley RN  Sleep/Rest Enhancement:   family presence promoted   regular sleep/rest pattern promoted   room darkened  Medication Review/Management: medications reviewed  Taken 2/27/2025 2030 by Yvonne Barkley RN  Sleep/Rest Enhancement:   family presence promoted   regular sleep/rest pattern promoted   room darkened  Medication Review/Management: medications reviewed     Problem: Anxiety  Goal: Anxiety Reduction or Resolution  Outcome: Progressing     Problem: Skin Injury Risk Increased  Goal: Skin Health and Integrity  Outcome: Progressing  Intervention: Optimize Skin Protection  Recent Flowsheet Documentation  Taken 2/28/2025 0400 by Yvonne Barkley RN  Head of Bed (HOB) Positioning: HOB at 20 degrees  Taken 2/28/2025 0000 by Yvonne Barkley RN  Head of Bed (HOB) Positioning: HOB at 20 degrees  Taken 2/27/2025 2030 by Yvonne Barkley RN  Activity Management: (in bed -- hasn't been up yet) other (see comments)  Head of Bed (HOB) Positioning: HOB at 20 degrees   Goal Outcome Evaluation:    A&O x4. Anxious, but does better when you tell her what to expect. Allowed boyfriend to stay at bedside overnight as an emotional support. Laparoscopy sites x2 and AMRITA site all without drainage. Continued AMRITA drain with output of 80 ml; drainage changed color from red/bloody at start of shift to tan/pinkish by end of shift. BP within normal limits. O2 sats within normal limits without supplemental oxygen. Up to bathroom with assist of 1; is fearful of moving but does so with encouragement. Pain 2-4/10; given norco 1 tab prn and ice pack with good control. No BM. Eating without difficulty; no complaints of nausea or vomiting; normoactive bowel sounds.

## 2025-02-28 NOTE — ANESTHESIA CARE TRANSFER NOTE
Patient: Ashleigh Bustillo    Procedure: Procedure(s):  APPENDECTOMY, LAPAROSCOPIC       Diagnosis: Acute appendicitis, unspecified acute appendicitis type [K35.80]  Diagnosis Additional Information: No value filed.    Anesthesia Type:   General     Note:    Oropharynx: oropharynx clear of all foreign objects and spontaneously breathing  Level of Consciousness: drowsy  Oxygen Supplementation: face mask  Level of Supplemental Oxygen (L/min / FiO2): 6  Independent Airway: airway patency satisfactory and stable  Dentition: dentition unchanged  Vital Signs Stable: post-procedure vital signs reviewed and stable  Report to RN Given: handoff report given  Patient transferred to: PACU    Handoff Report: Identifed the Patient, Identified the Reponsible Provider, Reviewed the pertinent medical history, Discussed the surgical course, Reviewed Intra-OP anesthesia mangement and issues during anesthesia, Set expectations for post-procedure period and Allowed opportunity for questions and acknowledgement of understanding      Vitals:  Vitals Value Taken Time   /59 02/27/25 1900   Temp 36.3  C (97.34  F) 02/27/25 1901   Pulse 82 02/27/25 1901   Resp 22 02/27/25 1901   SpO2 99 % 02/27/25 1901   Vitals shown include unfiled device data.    Electronically Signed By: SANTA Chavez CRNA  February 27, 2025  7:03 PM

## 2025-03-01 VITALS
OXYGEN SATURATION: 99 % | DIASTOLIC BLOOD PRESSURE: 71 MMHG | HEART RATE: 87 BPM | BODY MASS INDEX: 23.1 KG/M2 | TEMPERATURE: 98.6 F | RESPIRATION RATE: 16 BRPM | HEIGHT: 62 IN | WEIGHT: 125.5 LBS | SYSTOLIC BLOOD PRESSURE: 114 MMHG

## 2025-03-01 PROCEDURE — 99238 HOSP IP/OBS DSCHRG MGMT 30/<: CPT | Mod: 25 | Performed by: PHYSICIAN ASSISTANT

## 2025-03-01 PROCEDURE — 250N000013 HC RX MED GY IP 250 OP 250 PS 637: Performed by: SURGERY

## 2025-03-01 PROCEDURE — 250N000011 HC RX IP 250 OP 636: Performed by: SURGERY

## 2025-03-01 RX ORDER — HYDROCODONE BITARTRATE AND ACETAMINOPHEN 5; 325 MG/1; MG/1
1 TABLET ORAL EVERY 6 HOURS PRN
Qty: 10 TABLET | Refills: 0 | Status: SHIPPED | OUTPATIENT
Start: 2025-03-01

## 2025-03-01 RX ADMIN — HYDROCODONE BITARTRATE AND ACETAMINOPHEN 1 TABLET: 5; 325 TABLET ORAL at 00:47

## 2025-03-01 RX ADMIN — PIPERACILLIN AND TAZOBACTAM 3.38 G: 3; .375 INJECTION, POWDER, FOR SOLUTION INTRAVENOUS at 02:16

## 2025-03-01 ASSESSMENT — ACTIVITIES OF DAILY LIVING (ADL)
ADLS_ACUITY_SCORE: 25

## 2025-03-01 NOTE — PLAN OF CARE
Patient is alert and oriented. Pt endorses abdominal pain at surgical sites. Pt is ambulating frequently in the room and hallway. Pt is not passing gas and no BM today. Bowel sounds are active, but quiet. Pt tolerating food without issue. Staying overnight to monitor per surgery.    Janet Leiva, RN    Problem: Adult Inpatient Plan of Care  Goal: Readiness for Transition of Care  Outcome: Progressing     Problem: Suicide Risk  Goal: Absence of Self-Harm  Outcome: Progressing  Intervention: Assess Risk to Self and Maintain Safety  Recent Flowsheet Documentation  Taken 2/28/2025 1600 by Janet Leiva RN  Enhanced Safety Measures: room near unit station  Taken 2/28/2025 1200 by Janet Leiva RN  Enhanced Safety Measures: room near unit station  Taken 2/28/2025 0800 by Janet Leiva RN  Enhanced Safety Measures: room near unit station  Intervention: Promote Psychosocial Wellbeing  Recent Flowsheet Documentation  Taken 2/28/2025 1600 by Janet Leiva, RN  Sleep/Rest Enhancement:   family presence promoted   regular sleep/rest pattern promoted   room darkened  Taken 2/28/2025 1200 by Janet Leiva RN  Sleep/Rest Enhancement:   family presence promoted   regular sleep/rest pattern promoted   room darkened  Taken 2/28/2025 0800 by Janet Leiva RN  Sleep/Rest Enhancement:   family presence promoted   regular sleep/rest pattern promoted   room darkened   Goal Outcome Evaluation:      Plan of Care Reviewed With: patient, significant other    Overall Patient Progress: improvingOverall Patient Progress: improving

## 2025-03-01 NOTE — PROGRESS NOTES
General Surgery Progress Note:    Hospital Day # 0    ASSESSMENT:  1. Perforated appendicitis        Ashleigh Bustillo is a 24 year old adult who is s/p laparoscopic appendectomy with drain placement on 2/27/2025 for perforated appendicitis with abscess.    PLAN:  - Regular diet  - PO pain control  - Encourage activity and ambulation to promote bowel function  - home today with drain and PO abx    SUBJECTIVE:   Doing well, tolerating diet, no n/v, passing more gas    Patient Vitals for the past 24 hrs:   BP Temp Temp src Pulse Resp SpO2   03/01/25 0818 114/71 98.6  F (37  C) Oral 87 16 99 %   03/01/25 0031 116/62 99.1  F (37.3  C) Oral 88 16 95 %   02/28/25 2039 106/69 98.7  F (37.1  C) Oral 80 16 97 %   02/28/25 1601 103/69 98.8  F (37.1  C) Oral 89 16 --     PHYSICAL EXAM:  General: patient seen resting in bed, no acute distress  Resp: no respiratory distress, breathing comfortably on RA  Abdomen: soft, mildly distended, appropriately tender postoperatively around incision and drain site. Incisions clean, dry, intact with Steri-Strips and Band-Aids  Drains: cloudy serous output  Output by Drain (mL) 02/27/25 0700 - 02/27/25 1459 02/27/25 1500 - 02/27/25 2259 02/27/25 2300 - 02/28/25 0659 02/28/25 0700 - 02/28/25 1459 02/28/25 1500 - 02/28/25 2259 02/28/25 2300 - 03/01/25 0659 03/01/25 0700 - 03/01/25 0850   Drain Closed/Suction 1 Inferior;Midline Abdomen Bulb 19 Frisian Placed in port site  80 30  15 70       Extremities: warm and well perfused    02/28 0700 - 03/01 0659  In: 520 [P.O.:420; I.V.:100]  Out: 85 [Drains:85]    No visits with results within 2 Day(s) from this visit.   Latest known visit with results is:   Office Visit on 08/12/2024   Component Date Value    HIV Antigen Antibody Com* 08/12/2024 Nonreactive     Hepatitis C Antibody 08/12/2024 Nonreactive     Chlamydia Trachomatis 08/12/2024 Negative     Neisseria gonorrhoeae 08/12/2024 Negative     TSH 08/12/2024 0.90         STALIN Brian  [facility] [address]  [place] [address]?  Office: [phone]

## 2025-03-01 NOTE — PLAN OF CARE
Goal Outcome Evaluation:  A&O, VSS, Complained of moderate pain of a six in abdomen which was brought down to a 3 with NORCO. Drain is patent. Up independently in room. Partner at bedside.                   Problem: Pain Acute  Goal: Optimal Pain Control and Function  Outcome: Progressing  Intervention: Develop Pain Management Plan  Recent Flowsheet Documentation  Taken 3/1/2025 0047 by Paola Sampson, RN  Pain Management Interventions:   cold applied   medication (see MAR)  Intervention: Prevent or Manage Pain  Recent Flowsheet Documentation  Taken 3/1/2025 0200 by Paola Sampson, RN  Sensory Stimulation Regulation:   care clustered   lighting decreased  Sleep/Rest Enhancement:   family presence promoted   regular sleep/rest pattern promoted   room darkened  Medication Review/Management: medications reviewed

## 2025-03-01 NOTE — PROGRESS NOTES
Report given to YOLA Guevara. Pt was transferred to [facility] safely via wheelchair at 0025.

## 2025-03-01 NOTE — DISCHARGE SUMMARY
Discharge Provider: Heath Young PA-C  Admission Date: 2/27/2025  Discharge Date: 03/01/25      Primary Diagnosis at Discharge:   Patient Active Problem List   Diagnosis    Anorexia    Anxiety    Recurrent major depressive disorder, in partial remission    History of suicide attempt    Cannabis dependence (H)    Major depressive disorder, recurrent episode, moderate (H)    Alcohol dependence (H)    Perforated appendicitis      Disposition: Home or Self Care  Condition at Discharge: Stable     Surgery: [facility] Summary:   Ashleigh Bustillo was admitted for appendicitis and underwent a lap appy.  Following a brief recovery in the PACU, Ashleigh was transferred to the Med/Surg floor for the remainder of Ashleigh's stay.  Ashleigh's post operative course was uneventful, and Ashleigh's diet was advanced as tolerated.  On POD # 2 Ashleigh was discharged home tolerating a general diet, ambulating without assistance, and pain controlled with oral analgesics.        Discharge Medications:      Review of your medicines        START taking        Dose / Directions   amoxicillin-clavulanate 875-125 MG tablet  Commonly known as: AUGMENTIN      Dose: 1 tablet  Take 1 tablet by mouth 2 times daily for 5 days.  Quantity: 10 tablet  Refills: 0     HYDROcodone-acetaminophen 5-325 MG tablet  Commonly known as: NORCO      Dose: 1 tablet  Take 1 tablet by mouth every 6 hours as needed for moderate to severe pain.  Quantity: 10 tablet  Refills: 0            CONTINUE these medicines which have NOT CHANGED        Dose / Directions   ARIPiprazole 5 MG tablet  Commonly known as: ABILIFY  Used for: Anxiety, Major depressive disorder, recurrent episode, moderate (H)      Dose: 5 mg  TAKE 1 TABLET(5 MG) BY MOUTH DAILY  Quantity: 30 tablet  Refills: 0     levonorgestrel-ethinyl estradiol 0.15-0.03 MG tablet  Commonly known as: SEASONALE  Used for: Encounter for surveillance of contraceptive pills      Dose: 1 tablet  Take 1 tablet by mouth  daily.  Quantity: 91 tablet  Refills: 3     naltrexone 50 MG tablet  Commonly known as: DEPADE/REVIA  Used for: Cannabis dependence (H), Alcohol use disorder, moderate, dependence (H)      Dose: 50 mg  Take 1 tablet (50 mg) by mouth daily. Take 1/2 tablet daily for six days, then increase to one full tablet  Quantity: 30 tablet  Refills: 1     propranolol 10 MG tablet  Commonly known as: INDERAL  Used for: ASIA (generalized anxiety disorder)      TAKE 1 TABLET(10 MG) BY MOUTH TWICE DAILY AS NEEDED FOR ANXIETY  Quantity: 60 tablet  Refills: 0     * venlafaxine 150 MG 24 hr capsule  Commonly known as: EFFEXOR XR  Used for: Recurrent major depressive disorder, in partial remission, Anxiety      Dose: 150 mg  TAKE 1 CAPSULE(150 MG) BY MOUTH DAILY  Quantity: 90 capsule  Refills: 1     * venlafaxine 75 MG 24 hr capsule  Commonly known as: EFFEXOR XR  Used for: Recurrent major depressive disorder, in partial remission, Anxiety      TAKE 1 CAPSULE(75 MG) BY MOUTH DAILY IN ADDITION TO EFFEXOR  MG MAKING A. TOTAL DAILY DOSE  MG  Quantity: 30 capsule  Refills: 0           * This list has 2 medication(s) that are the same as other medications prescribed for you. Read the directions carefully, and ask your doctor or other care provider to review them with you.                   Where to get your medicines        [id number] - [place], [place] - [address] AT [facility] [address]  [address], [facility] [address]      Hours: called pharm.  they do accept faxes Phone: [phone]   amoxicillin-clavulanate 875-125 MG tablet       Some of these will need a paper prescription and others can be bought over the counter. Ask your nurse if you have questions.    Bring a paper prescription for each of these medications  HYDROcodone-acetaminophen 5-325 MG tablet          Discharge Instructions:  Diet: regular  Restrictions: as tolerated    Follow up appointment with Primary Care Physician: Santo Escamilla  Follow up appointment with General Surgery in 1 week

## 2025-03-01 NOTE — PLAN OF CARE
"  Problem: Adult Inpatient Plan of Care  Goal: Plan of Care Review  Description: The Plan of Care Review/Shift note should be completed every shift.  The Outcome Evaluation is a brief statement about your assessment that the patient is improving, declining, or no change.  This information will be displayed automatically on your shift  note.  Outcome: Adequate for Care Transition  Goal: Patient-Specific Goal (Individualized)  Description: You can add care plan individualizations to a care plan. Examples of Individualization might be:  \"Parent requests to be called daily at 9am for status\", \"I have a hard time hearing out of my right ear\", or \"Do not touch me to wake me up as it startles  me\".  Outcome: Adequate for Care Transition  Goal: Absence of Hospital-Acquired Illness or Injury  Outcome: Adequate for Care Transition  Goal: Optimal Comfort and Wellbeing  Outcome: Adequate for Care Transition  Goal: Readiness for Transition of Care  Outcome: Adequate for Care Transition     Problem: Suicide Risk  Goal: Absence of Self-Harm  Outcome: Adequate for Care Transition     Problem: Infection  Goal: Absence of Infection Signs and Symptoms  Outcome: Adequate for Care Transition     Problem: Pain Acute  Goal: Optimal Pain Control and Function  Outcome: Adequate for Care Transition     Problem: Anxiety  Goal: Anxiety Reduction or Resolution  Outcome: Adequate for Care Transition     Problem: Skin Injury Risk Increased  Goal: Skin Health and Integrity  Outcome: Adequate for Care Transition   Goal Outcome Evaluation: patient met goals for discharge. AVS reviewed with patient and partner. Patient demonstrated proper care of AMRITA drain. PIV removed.                        "

## 2025-03-03 ENCOUNTER — TELEPHONE (OUTPATIENT)
Dept: SURGERY | Facility: CLINIC | Age: 25
End: 2025-03-03

## 2025-03-03 LAB
PATH REPORT.COMMENTS IMP SPEC: NORMAL
PATH REPORT.COMMENTS IMP SPEC: NORMAL
PATH REPORT.FINAL DX SPEC: NORMAL
PATH REPORT.GROSS SPEC: NORMAL
PATH REPORT.MICROSCOPIC SPEC OTHER STN: NORMAL
PATH REPORT.RELEVANT HX SPEC: NORMAL
PHOTO IMAGE: NORMAL

## 2025-03-03 NOTE — TELEPHONE ENCOUNTER
[facility] Post-Op Phone Call                     Surgeon: Margie Renteria    Date of Surgery: 2/27/25  Surgery: Laparoscopic Appendectomy  Discharge Date: 3/01/25    Date/Time Called:   Date: 3/3/2025 Time: 3:12 PM   Attempt: First    Attempted to contact patient to check on them post-operatively. Left a voicemail to call back with any questions or concerns.      Thank you for your time. Please do not hesitate to call us with any questions or concerns.    Call completed by: Zenaida Werner RN

## 2025-03-05 ENCOUNTER — OFFICE VISIT (OUTPATIENT)
Dept: SURGERY | Facility: CLINIC | Age: 25
End: 2025-03-05
Payer: COMMERCIAL

## 2025-03-05 VITALS — HEIGHT: 62 IN | WEIGHT: 125 LBS | BODY MASS INDEX: 23 KG/M2

## 2025-03-05 DIAGNOSIS — Z48.89 POSTOPERATIVE VISIT: Primary | ICD-10-CM

## 2025-03-05 PROCEDURE — 99024 POSTOP FOLLOW-UP VISIT: CPT

## 2025-03-05 NOTE — PROGRESS NOTES
"HPI: Patient is here for follow-up of a laparoscopic appendectomy for perforated appendicitis with abscess with Dr. Renteria on 2/27/25. Ashleigh presents today for a drain check. Reports less than 20 ml of clear thin yellow drainage daily for the last 2-3 days.  Was prescribed Augmentin at discharge which she has 1 more day left to complete the full regimen as prescribed. Ashleigh is overall doing well. Pain is well controlled. No difficulties with the surgical wound(s). Ashleigh is eating well and drinking well with no nausea or vomiting. Denies fever and chills. No changes to bladder or bowel function.    Ht 1.575 m (5' 2\")   Wt 56.7 kg (125 lb)   BMI 22.86 kg/m      Exam:  General: Appears well  Abdomen: Soft, mild TTP surrounding drain site, non-distended. No rebound or guarding.  Surgical wounds: Incisions healing well, clean/dry/intact with no induration or drainage.  Drain: Bulb on suction with serous output. Drain removed in clinic with no issues.  Previous drain site reinforced with Steri-Strip and covered with gauze and tape.    Pathology:  -ACUTE APPENDICITIS AND PERIAPPENDICITIS WITH EVIDENCE OF RUPTURE  -NO TUMOR SEEN    Assessment/Plan: Doing well after surgery and should follow up as needed. Continue taking Augmentin as prescribed. Drain was removed in clinic with no issues.  Discussed Steri-Strip should fall off over the next 7 to 10 days. OK to cover previous drain site with gauze and tape as needed if drainage occurs which should resolve with time.      Elsie Sevilla PA-C  [facility] General Surgery  [address]  [place] [address]  Clinic [phone]  "

## 2025-04-19 ASSESSMENT — ANXIETY QUESTIONNAIRES
IF YOU CHECKED OFF ANY PROBLEMS ON THIS QUESTIONNAIRE, HOW DIFFICULT HAVE THESE PROBLEMS MADE IT FOR YOU TO DO YOUR WORK, TAKE CARE OF THINGS AT HOME, OR GET ALONG WITH OTHER PEOPLE: SOMEWHAT DIFFICULT
1. FEELING NERVOUS, ANXIOUS, OR ON EDGE: NEARLY EVERY DAY
8. IF YOU CHECKED OFF ANY PROBLEMS, HOW DIFFICULT HAVE THESE MADE IT FOR YOU TO DO YOUR WORK, TAKE CARE OF THINGS AT HOME, OR GET ALONG WITH OTHER PEOPLE?: SOMEWHAT DIFFICULT
GAD7 TOTAL SCORE: 12
7. FEELING AFRAID AS IF SOMETHING AWFUL MIGHT HAPPEN: MORE THAN HALF THE DAYS
5. BEING SO RESTLESS THAT IT IS HARD TO SIT STILL: SEVERAL DAYS
4. TROUBLE RELAXING: MORE THAN HALF THE DAYS
6. BECOMING EASILY ANNOYED OR IRRITABLE: NOT AT ALL
2. NOT BEING ABLE TO STOP OR CONTROL WORRYING: MORE THAN HALF THE DAYS
3. WORRYING TOO MUCH ABOUT DIFFERENT THINGS: MORE THAN HALF THE DAYS

## 2025-04-22 ENCOUNTER — MYC MEDICAL ADVICE (OUTPATIENT)
Dept: ADDICTION MEDICINE | Facility: CLINIC | Age: 25
End: 2025-04-22
Payer: COMMERCIAL

## 2025-04-22 ENCOUNTER — VIRTUAL VISIT (OUTPATIENT)
Dept: ADDICTION MEDICINE | Facility: CLINIC | Age: 25
End: 2025-04-22
Payer: COMMERCIAL

## 2025-04-22 DIAGNOSIS — F33.1 MAJOR DEPRESSIVE DISORDER, RECURRENT EPISODE, MODERATE (H): ICD-10-CM

## 2025-04-22 DIAGNOSIS — F10.20 ALCOHOL USE DISORDER, MODERATE, DEPENDENCE (H): Primary | ICD-10-CM

## 2025-04-22 DIAGNOSIS — F10.20 ALCOHOL USE DISORDER, MODERATE, DEPENDENCE (H): ICD-10-CM

## 2025-04-22 DIAGNOSIS — F12.20 CANNABIS DEPENDENCE (H): ICD-10-CM

## 2025-04-22 RX ORDER — NALTREXONE HYDROCHLORIDE 50 MG/1
50 TABLET, FILM COATED ORAL DAILY
Qty: 30 TABLET | Refills: 1 | Status: SHIPPED | OUTPATIENT
Start: 2025-04-22

## 2025-04-22 ASSESSMENT — PATIENT HEALTH QUESTIONNAIRE - PHQ9
SUM OF ALL RESPONSES TO PHQ QUESTIONS 1-9: 10
SUM OF ALL RESPONSES TO PHQ QUESTIONS 1-9: 10
10. IF YOU CHECKED OFF ANY PROBLEMS, HOW DIFFICULT HAVE THESE PROBLEMS MADE IT FOR YOU TO DO YOUR WORK, TAKE CARE OF THINGS AT HOME, OR GET ALONG WITH OTHER PEOPLE: SOMEWHAT DIFFICULT

## 2025-04-22 ASSESSMENT — ANXIETY QUESTIONNAIRES: GAD7 TOTAL SCORE: 12

## 2025-04-22 NOTE — PROGRESS NOTES
Video-Visit Details    Type of service:  Video Visit    Video Start Time:  1004  Video End Time: 10:35 AM    Originating Location (pt. Location): Home    Distant Location (provider location):  [facility]     Platform used for Video Visit: [facility]    A/P                                                    ASSESSMENT/PLAN  1. Alcohol use disorder, moderate, dependence (H)  Reports she was able to start naltrexone after her initial appointment in December, and was able to reduce her alcohol use while taking but her use resumed prior level after she ran out of medications. She would like to resume naltrexone with the goal of reduction in use, and is interested in IOP.   Referral placed for isis eval.   Resume naltrexone, titrate to 50 mg daily  Patient denies withdrawal history, reviewed symptoms of withdrawal and recommend that she go to ED for severe symptoms.   Follow up 3 weeks  Check hepatic profile at follow up  - Adult Mental Health  Referral  - Adult Mental Health  Referral; Future  - naltrexone (DEPADE/REVIA) 50 MG tablet; Take 1 tablet (50 mg) by mouth daily. Take 1/2 tablet daily for six days, then increase to one full tablet  Dispense: 30 tablet; Refill: 1    2. Cannabis dependence (H)  Naltrexone may provide benefits. Starting NAC 1200 mg BID.   Proceed with isis eval  - Adult Mental Health  Referral  - Adult Mental Health  Referral; Future  - naltrexone (DEPADE/REVIA) 50 MG tablet; Take 1 tablet (50 mg) by mouth daily. Take 1/2 tablet daily for six days, then increase to one full tablet  Dispense: 30 tablet; Refill: 1  - acetylcysteine (N-ACETYL CYSTEINE) 600 MG CAPS capsule; Take 2 capsules (1,200 mg) by mouth 2 times daily.  Dispense: 60 capsule; Refill: 0    3. Major depressive disorder, recurrent episode, moderate (H)  Reporting stable mood. Is taking Abilify, propranolol, and effexor prescribed by [facility]  "psychiatry. Referral placed for therapy  - Adult Mental Health  Referral  - Adult Mental Health  Referral; Future    Orders Placed This Encounter   Medications    naltrexone (DEPADE/REVIA) 50 MG tablet     Sig: Take 1 tablet (50 mg) by mouth daily. Take 1/2 tablet daily for six days, then increase to one full tablet     Dispense:  30 tablet     Refill:  1    acetylcysteine (N-ACETYL CYSTEINE) 600 MG CAPS capsule     Sig: Take 2 capsules (1,200 mg) by mouth 2 times daily.     Dispense:  60 capsule     Refill:  0       Problem list updated Apr 22, 2025   No problems updated.          PDMP Review       None              RTC  Return in about 20 days (around 5/12/2025) for Follow up, using a video visit 0900.      Counseled the patient on the importance of having a recovery program in addition to medication to manage recovery.  Components include avoiding isolating, having willingness to change, avoiding triggers and managing cravings. Encouraged having some type of sober network and practicing honesty with trusted support person(s). Encouraged other services such as counseling, 12 step or other self-help organizations.              SUBJECTIVE                                                    HPI:   Ashleigh Bustillo is a 24 year old female with history of depression, cannabis, nicotine and alochol use who presents for follow up     Brief history: Initial visit 12/5/2024 with Cindy Benito CNP.   Ashleigh's substance use started at age 19 and started using on a regular basis two years ago.  Ashleigh has had thoughts of taking a break from using substances but has not been able to reduce or abstain.  Currently alcohol use is mixed vodka drinks 2-6 drinks every other day, she is using cannabis vaping and flower throughout the day, and nicotine use throughout the day.  Ashleigh reports that her dad struggles with alcohol use and her brother uses \"Harder drugs\" unable to identify.  She has not been to treatment or tried " "pharmacotherapy options in the past.  She would like to reduce/abstain from all substances.    Naltrexone was started at her intiial appointment. She was lost to follow up, RTC 4/22/25.        Substance Use History:   \"Have you ever had any history with [...] use?\" And \"When was your last use?  ALCOHOL - every other day  CANNABIS - daily  PRESCRIPTION STIMULANTS (includes Ritalin, Adderall, Vyvanse) - denies  COCAINE/CRACK - denies  METH/AMPHETAMINES (includes ecstacy, MDMA/patrice) - denies  OPIATES - denies  BENZODIAZEPINES (includes Ativan, Klonopin, Xanax) - denies  KRATOM (mild opioid and stimulant effects) - denies  KETAMINE - denies  HALLUCINOGENS (includes DXM) - experimental   BEHAVIORAL (Gambling, Eating d/o, Compulsivity) - anorexia   History of treatment - denies  NICOTINE  Cigarettes: none  Chew/snus: none  Vaping: current,   Past NRT/medication use: none         Previous withdrawal treatment episodes (e.g. detox): denies  Previous LARON treatment programs: denies  Hospitalizations or overdose: inpatient 14 years old  Medical complications from substance use: unknown  IV Drug use?: denies  Previous Medication for Addiction Tx: denies  Longest period of full abstinence:  none  Activities that have previously supported abstinence: none  Current Recovery Activities: none      Interim History:   Hospitalized 2/27-3/1/25 for appendicitis, is s/p laparoscopic appendectomy 2/27/25.     TODAY'S VISIT  HPI Apr 22, 2025    Stated naltrexone in December, states it had helped her cravings, and was able to reduce her alcohol use when taking.   Is drinking 3-6 drinks 3-4 times weekly. Goal is to reduce her alcohol use, to consuming less and less often. To not drink alone.   Is vaping nicotine and using THC daily.   Is interested in outpatient treatment. Works full time as a dental assistance  Mood has been good, some increased anxiety since she started work.     Recent HPI Details: 12/5/24     ASSESSMENT/PLAN:  1. " Alcohol use disorder, moderate, dependence (H) (Primary)  -needs improvement  - naltrexone (DEPADE/REVIA) 50 MG tablet; Take 1 tablet (50 mg) by mouth daily. Take 1/2 tablet daily for six days, then increase to one full tablet  Dispense: 30 tablet; Refill: 1  - [facility] Ref[facility]; Future  -Call [phone] to schedule a LARON assessment and follow all recommendation  -follow up Dec 26, 24           2. Cannabis dependence (H)  -needs improvement.   - [facility] Referral  - naltrexone (DEPADE/REVIA) 50 MG tablet; Take 1 tablet (50 mg) by mouth daily. Take 1/2 tablet daily for six days, then increase to one full tablet  Dispense: 30 tablet; Refill: 1  - [facility] Referral; Future  NAC  I recommend using N-acetylcystine (aka NAC). This is a supplement that can be purchased at any [employer], [employer], or . If it works, it will help reduce cravings to use cannabis     The lowest recommended dose is 600mg twice daily. I suggest you start with this dose and see if you notice any effects, positive or otherwise. There is not a clear side-effect profile to warn you about. Please talk with a pharmacist where you  your medications to ask about any further side-effect concerns.              3. Major depressive disorder, recurrent episode, moderate (H)  -needs improvement  -continue follow up with psychiatry Suresh Josseline and follow all recommendaitons  - [facility] Referral  - [facility] Referral; Future     4. Nicotine dependence due to vaping non-tobacco product  -needs improvement   - nicotine (NICODERM CQ) 14 MG/24HR 24 hr patch; Place 1 patch over 24 hours onto the skin every 24 hours.  Dispense: 30 patch; Refill: 1  - nicotine (NICORETTE) 2 MG gum; Place 1 each (2 mg) inside cheek as needed for nicotine withdrawal symptoms.  Dispense: 110 each; Refill: 2               12/10/2024    12:52 PM 2/19/2025     5:20 AM  4/22/2025     9:33 AM   PHQ   PHQ-9 Total Score 15  5  10    Q9: Thoughts of better off dead/self-harm past 2 weeks Several days  Not at all Several days   F/U: Thoughts of suicide or self-harm Yes   Yes   F/U: Self harm-plan No   No   F/U: Self-harm action No   No   F/U: Safety concerns No   No       Patient-reported    Proxy-reported       OBJECTIVE                                                    PHYSICAL EXAM:  There were no vitals taken for this visit.      Physical Exam  Pulmonary:      Effort: Pulmonary effort is normal. No respiratory distress.   Skin:     Coloration: Skin is not jaundiced.   Neurological:      General: No focal deficit present.      Mental Status: Ashleigh is alert and oriented to person, place, and time.   Psychiatric:         Attention and Perception: Attention normal.         Mood and Affect: Mood normal.         Speech: Speech normal.         Behavior: Behavior is cooperative.         Thought Content: Thought content normal.         Judgment: Judgment normal.         LAB      HISTORY                                                    Problem list reviewed & adjusted, as indicated.  Patient Active Problem List   Diagnosis    Anorexia    Anxiety    Recurrent major depressive disorder, in partial remission    History of suicide attempt    Cannabis dependence (H)    Major depressive disorder, recurrent episode, moderate (H)    Alcohol dependence (H)    Perforated appendicitis         MEDICATION LIST (prior to visit)  Current Outpatient Medications   Medication Sig Dispense Refill    acetylcysteine (N-ACETYL CYSTEINE) 600 MG CAPS capsule Take 2 capsules (1,200 mg) by mouth 2 times daily. 60 capsule 0    naltrexone (DEPADE/REVIA) 50 MG tablet Take 1 tablet (50 mg) by mouth daily. Take 1/2 tablet daily for six days, then increase to one full tablet 30 tablet 1    ARIPiprazole (ABILIFY) 5 MG tablet TAKE 1 TABLET(5 MG) BY MOUTH DAILY 30 tablet 0    levonorgestrel-ethinyl estradiol (SEASONALE)  0.15-0.03 MG tablet Take 1 tablet by mouth daily. 91 tablet 3    propranolol (INDERAL) 10 MG tablet TAKE 1 TABLET(10 MG) BY MOUTH TWICE DAILY AS NEEDED FOR ANXIETY 60 tablet 0    venlafaxine (EFFEXOR XR) 150 MG 24 hr capsule TAKE 1 CAPSULE(150 MG) BY MOUTH DAILY 90 capsule 1    venlafaxine (EFFEXOR XR) 75 MG 24 hr capsule TAKE 1 CAPSULE(75 MG) BY MOUTH DAILY IN ADDITION TO EFFEXOR  MG MAKING A. TOTAL DAILY DOSE  MG 30 capsule 0     No current facility-administered medications for this visit.       MEDICATION LIST (after visit)  Current Outpatient Medications   Medication Sig Dispense Refill    acetylcysteine (N-ACETYL CYSTEINE) 600 MG CAPS capsule Take 2 capsules (1,200 mg) by mouth 2 times daily. 60 capsule 0    naltrexone (DEPADE/REVIA) 50 MG tablet Take 1 tablet (50 mg) by mouth daily. Take 1/2 tablet daily for six days, then increase to one full tablet 30 tablet 1    ARIPiprazole (ABILIFY) 5 MG tablet TAKE 1 TABLET(5 MG) BY MOUTH DAILY 30 tablet 0    levonorgestrel-ethinyl estradiol (SEASONALE) 0.15-0.03 MG tablet Take 1 tablet by mouth daily. 91 tablet 3    propranolol (INDERAL) 10 MG tablet TAKE 1 TABLET(10 MG) BY MOUTH TWICE DAILY AS NEEDED FOR ANXIETY 60 tablet 0    venlafaxine (EFFEXOR XR) 150 MG 24 hr capsule TAKE 1 CAPSULE(150 MG) BY MOUTH DAILY 90 capsule 1    venlafaxine (EFFEXOR XR) 75 MG 24 hr capsule TAKE 1 CAPSULE(75 MG) BY MOUTH DAILY IN ADDITION TO EFFEXOR  MG MAKING A. TOTAL DAILY DOSE  MG 30 capsule 0     No current facility-administered medications for this visit.         No Known Allergies     Continued Complex Management  The longitudinal plan of care for Alcohol Use Disorder (AUD) was addressed during this visit. Due to the added complexity in care, I will continue to support Ashleigh in the subsequent management and with ongoing continuity of care.        Ana Levy, DNP,MSN, AGNP-C  [facility]   [address]  [address]   [place] [address]   Phone # [phone]

## 2025-04-22 NOTE — PATIENT INSTRUCTIONS
Start naltrexone 1/2 tablet (25 mg) daily for 1 week, then take 1 tablet (50 mg) daily.  A referral was placed for a chemical health assessment, please call [phone] to schedule.    Alcohol Use Disorder  The diagnosis of alcohol use disorder is made using the DSM-V criteria for Substance Use Disorders - https://bit.ly/3DBJRId (link to Psychology Today report on these criteria). The FDA has 3 medications approved for alcohol use disorder - naltrexone (Vivitrol injection), acamprosate (Campral), and disulfiram (Antabuse).    For your reading purposes - a neurotransmitter is a chemical released in the brain that provides a signal directing the activity of nerves. The result is a predictable response based on the type of neurotransmitter. For example, serotonin, dopamine and norepinephrine (closely related to adrenaline) are all neurotransmitters.    Naltrexone (Vivitrol injection)  Naltrexone blocks the opioid receptors in the brain. It is basically naloxone (Narcan) in pill form. Why does an opioid blocker help with alcohol use? Our opioid receptors are part of the reward signaling during alcohol consumption, closely associated with dopamine release. Dopamine is a neurotransmitter synonymous with  reward . ALL substances that can cause addiction release dopamine at high levels. So, when naltrexone blocks opioid receptors, we feel less reward when consuming alcohol. This also leads to less craving BEFORE drinking alcohol, since craving is also associated with these neurotransmitters. The result is A) Fewer days drinking (higher chance of abstinence) and B) Less alcohol consumed if/when drinking occurs. This is prescribed 1 pill, 1 time per day. Please tell your provider if you have any liver damage, as this can change our management plan with naltrexone.    Naltrexone blocks the opioid receptor, so pain medications or illicit opioids will NOT provide the expected response. If you have an injury or a surgery,  "naltrexone will block the pain medications, so you need to talk to your provider to account for this.    Acamprosate (Campral)  Acamprosate provides the same results as naltrexone - A) Fewer days drinking (higher chance of abstinence) and B) Less alcohol consumed if/when drinking occurs. These two medications generally provide a similar chance of success. However, it acts differently in the brain/body. Alcohol directly impacts the activity of a pair of neurotransmitters - YESY and glutamate. Symptoms of alcohol withdrawal, and post-acute withdrawal, are partly driven by changes in these chemicals. Acamprosate  stabilizes  the way YESY and glutamate influence each other, largely by reducing the influence of glutamate (the \"excitatory\" factor, balanced by the \"inhibitory\" YESY). The result is usually subtle but ultimately patients can experience some anxiety relief, less restlessness, and more  leveled out . There is often craving relief as well. This is prescribed 2 pills, 3 times per day. Please tell your doctor if you have any kidney damage, as this can change our management plan with naltrexone.      Gabapentin (Neurontin)  Gabapentin also helps support reduced amount of alcohol use and fewer days of drinking. It is often used for alcohol withdrawal treatment because it acts on the same YESY receptor as alcohol. It helps stabilize symptoms, almost as an alcohol \"replacement\" or substitute. It has similar effects as alcohol - it provides sedation and some relief from anxiety and restlessness. It is also commonly used for nerve pain - it works in the brain to change how ALL of the nerves are signaling, increasing the inhibition throughout the brain. Unfortunately, similarly to alcohol, the body develops a dependence on gabapentin, and if it is stopped abruptly WITHOUT tapering, you can have symptoms related to discontinuation and this can be dangerous. This is prescribed at various doses. Please tell your doctor " about any kidney damage, as this can change our dosing regimen with gabapentin.'    Disulfiram (Antabuse)  Disulfiram is a deterrent. At therapeutic doses of disulfiram, alcohol consumption results in increased serum acetaldehyde, causing diaphoresis, palpitations, facial flushing, nausea, vertigo, hypotension, and tachycardia. This aggregation of symptoms is known as the disulfiram-alcohol reaction and discourages alcohol intake. With this medication, it is important to avoid consuming items that have alcohol in their ingredients, for example some cold medicines, mouth washes, salad dressings. It is important to read labels

## 2025-04-28 ENCOUNTER — OFFICE VISIT (OUTPATIENT)
Dept: FAMILY MEDICINE | Facility: CLINIC | Age: 25
End: 2025-04-28
Payer: COMMERCIAL

## 2025-04-28 VITALS
TEMPERATURE: 98.9 F | RESPIRATION RATE: 16 BRPM | HEIGHT: 62 IN | BODY MASS INDEX: 23 KG/M2 | WEIGHT: 125 LBS | HEART RATE: 102 BPM | SYSTOLIC BLOOD PRESSURE: 110 MMHG | DIASTOLIC BLOOD PRESSURE: 64 MMHG | OXYGEN SATURATION: 98 %

## 2025-04-28 DIAGNOSIS — F33.1 MAJOR DEPRESSIVE DISORDER, RECURRENT EPISODE, MODERATE (H): ICD-10-CM

## 2025-04-28 DIAGNOSIS — R46.89 BEHAVIOR CONCERN: Primary | ICD-10-CM

## 2025-04-28 PROCEDURE — 3078F DIAST BP <80 MM HG: CPT | Performed by: FAMILY MEDICINE

## 2025-04-28 PROCEDURE — 3074F SYST BP LT 130 MM HG: CPT | Performed by: FAMILY MEDICINE

## 2025-04-28 PROCEDURE — G2211 COMPLEX E/M VISIT ADD ON: HCPCS | Performed by: FAMILY MEDICINE

## 2025-04-28 PROCEDURE — 99213 OFFICE O/P EST LOW 20 MIN: CPT | Performed by: FAMILY MEDICINE

## 2025-04-28 NOTE — PROGRESS NOTES
"  Assessment & Plan     (R46.89) Behavior concern  (primary encounter diagnosis)  Comment: pt state sometimes at work she has hard time to focus on the task. She did not have childhood adhd and autism. She likes to have the testing.   Plan: Adult Mental Health  Referral        She will have testing for adhd and then autism with psychologist.     (F33.1) Major depressive disorder, recurrent episode, moderate (H)  Comment: pt has depression for years and takes medication as instructed and no side effect. She thinks medication helps. She follow-up with psychiatrist. She dose not have psychologist..   Plan: advise continue follow-up with psychiatrist.       The longitudinal plan of care for the diagnosis(es)/condition(s) as documented were addressed during this visit. Due to the added complexity in care, I will continue to support Ashleigh in the subsequent management and with ongoing continuity of care.        Follow-up   No follow-ups on file.        Subjective   Ashleigh is a 24 year old, presenting for the following health issues:  Referral (Referral to check for ADHD & Autisum)        4/28/2025     8:48 AM   Additional Questions   Roomed by Gen VINITA CMA         4/28/2025     8:48 AM   Patient Reported Additional Medications   Patient reports taking the following new medications Vitamin D     History of Present Illness       Reason for visit:  Autism and ADHD testing meeting Ashleigh is missing 2 dose(s) of medications per week.  Ashleigh is not taking prescribed medications regularly due to remembering to take.             Review of Systems  Constitutional, HEENT, cardiovascular, pulmonary, gi and gu systems are negative, except as otherwise noted.      Objective    /64 (BP Location: Right arm, Patient Position: Sitting, Cuff Size: Adult Regular)   Pulse 102   Temp 98.9  F (37.2  C) (Oral)   Resp 16   Ht 1.575 m (5' 2\")   Wt 56.7 kg (125 lb)   LMP 04/14/2025   SpO2 98%   Breastfeeding No   BMI 22.86 " kg/m    Body mass index is 22.86 kg/m .  Physical Exam   GENERAL: alert and no distress   Mental status exam; Ashleigh is alert, orient to time, person and place. Normal thought content, speech, affect, mood and dress are noted.          Signed Electronically by: Shaista Mckeon MD

## 2025-04-29 ENCOUNTER — PATIENT OUTREACH (OUTPATIENT)
Dept: CARE COORDINATION | Facility: CLINIC | Age: 25
End: 2025-04-29
Payer: COMMERCIAL

## 2025-04-29 ENCOUNTER — HOSPITAL ENCOUNTER (OUTPATIENT)
Dept: BEHAVIORAL HEALTH | Facility: CLINIC | Age: 25
Discharge: HOME | End: 2025-04-29
Attending: NURSE PRACTITIONER
Payer: COMMERCIAL

## 2025-04-29 DIAGNOSIS — F12.20 CANNABIS DEPENDENCE (H): ICD-10-CM

## 2025-04-29 DIAGNOSIS — F10.20 ALCOHOL USE DISORDER, MODERATE, DEPENDENCE (H): ICD-10-CM

## 2025-04-29 PROCEDURE — H0001 ALCOHOL AND/OR DRUG ASSESS: HCPCS

## 2025-04-29 RX ORDER — NALTREXONE HYDROCHLORIDE 50 MG/1
50 TABLET, FILM COATED ORAL DAILY
Qty: 30 TABLET | Refills: 0 | Status: SHIPPED | OUTPATIENT
Start: 2025-04-29

## 2025-04-29 ASSESSMENT — COLUMBIA-SUICIDE SEVERITY RATING SCALE - C-SSRS
1. IN THE PAST MONTH, HAVE YOU WISHED YOU WERE DEAD OR WISHED YOU COULD GO TO SLEEP AND NOT WAKE UP?: YES
2. HAVE YOU ACTUALLY HAD ANY THOUGHTS OF KILLING YOURSELF?: YES
4. HAVE YOU HAD THESE THOUGHTS AND HAD SOME INTENTION OF ACTING ON THEM?: YES
5. HAVE YOU STARTED TO WORK OUT OR WORKED OUT THE DETAILS OF HOW TO KILL YOURSELF? DO YOU INTEND TO CARRY OUT THIS PLAN?: NO
2. HAVE YOU ACTUALLY HAD ANY THOUGHTS OF KILLING YOURSELF?: YES
1. HAVE YOU WISHED YOU WERE DEAD OR WISHED YOU COULD GO TO SLEEP AND NOT WAKE UP?: YES
4. HAVE YOU HAD THESE THOUGHTS AND HAD SOME INTENTION OF ACTING ON THEM?: NO
3. HAVE YOU BEEN THINKING ABOUT HOW YOU MIGHT KILL YOURSELF?: YES
5. HAVE YOU STARTED TO WORK OUT OR WORKED OUT THE DETAILS OF HOW TO KILL YOURSELF? DO YOU INTEND TO CARRY OUT THIS PLAN?: YES

## 2025-04-29 ASSESSMENT — ANXIETY QUESTIONNAIRES
GAD7 TOTAL SCORE: 9
6. BECOMING EASILY ANNOYED OR IRRITABLE: SEVERAL DAYS
7. FEELING AFRAID AS IF SOMETHING AWFUL MIGHT HAPPEN: SEVERAL DAYS
5. BEING SO RESTLESS THAT IT IS HARD TO SIT STILL: SEVERAL DAYS
2. NOT BEING ABLE TO STOP OR CONTROL WORRYING: NEARLY EVERY DAY
GAD7 TOTAL SCORE: 9
4. TROUBLE RELAXING: SEVERAL DAYS
1. FEELING NERVOUS, ANXIOUS, OR ON EDGE: SEVERAL DAYS
3. WORRYING TOO MUCH ABOUT DIFFERENT THINGS: SEVERAL DAYS

## 2025-04-29 ASSESSMENT — PATIENT HEALTH QUESTIONNAIRE - PHQ9
10. IF YOU CHECKED OFF ANY PROBLEMS, HOW DIFFICULT HAVE THESE PROBLEMS MADE IT FOR YOU TO DO YOUR WORK, TAKE CARE OF THINGS AT HOME, OR GET ALONG WITH OTHER PEOPLE: SOMEWHAT DIFFICULT
SUM OF ALL RESPONSES TO PHQ QUESTIONS 1-9: 10
SUM OF ALL RESPONSES TO PHQ QUESTIONS 1-9: 10

## 2025-04-29 NOTE — PROGRESS NOTES
"    [facility]     PATIENT'S NAME: Ashleigh Bustillo  PREFERRED NAME: Ashleigh  PRONOUNS:   MRN: [id number]  : 2000  [facility] [address] [address]  [facility]  [id number]  DATE OF SERVICE: 25  START TIME: 9:30 AM   END TIME: 11:00 AM   PREFERRED PHONE: [phone]  May we leave a program related message: Yes  EMERGENCY CONTACT: was obtained Chapitoanais Bergman (boyfriend) [phone]  SERVICE MODALITY:  In-person    UNIVERSAL ADULT Substance Use Disorder DIAGNOSTIC ASSESSMENT    Identifying Information:  Patient is a 24 year old, Hmong individual.  Patient was referred for an assessment by self.  Patient attended the session alone.    Chief Complaint:   The reason for seeking services at this time is: \"Substance abuse. My cannabis and vaping use has been daily from when I wake up to when I go to sleep. I sometimes have to force myself to not bring my cartridge to work and school so that I do not use. When I drink I get a lot of pain in my abdomen and then I tell myself that I am not going to drink then I sober up and drink again. I am thinking about my future and when I get a job and go to school I do not want to be using at the same time.\" The problem(s) began 25.  Patient has attempted to resolve these concerns in the past through I have tried to cut back on use and it only lasted about a day. For alcohol since I dont use it daily I try to justify it as it is not a lot but I do drink socially a lot .  Patient does not appear to be in severe withdrawal, an imminent safety risk to self or others, or requiring immediate medical attention and may proceed with the assessment interview.    Social/Family History:  Patient reported they grew up in [place], [place]. They were raised by biological parents; grandmother; grandfather.  Parents were always together.  1 sister and 3 brothers. Patient reported that their childhood was " I don't think it was the best, I do not remember a lot but it was a hard time for me. Dad was an alcoholic, I had to take keys away and get the kids away so they were not seeing what was going on.  Patient described their current relationships with family of origin as with mom really good and with dad fair. Dad is still using.    The patient describes their cultural background as Hmong.  Cultural influences and impact on patient's life structure, values, norms, and healthcare: None.  Contextual influences on patient's health include: Health- Seeking Factors I have some pain when I do drink and it concerns me about my liver .  Patient identified their preferred language to be English. Patient reported they does not need the assistance of an  or other support involved in therapy.  Patient reports they are not involved in community of mele activities.  They reports spirituality impacts recovery in the following ways:  Not involved.     Patient reported had no significant delays in developmental tasks.   Patient's highest education level was some college. Patient identified the following learning problems: none reported.  Patient reports they are  able to understand written materials.    Patient reported the following relationship history I have been in relationships in the past.  Patient's current relationship status is has a partner or significant other for 1 year. Patient identified their sexual orientation as bi-sexual.  Patient reported having 0 child(aisha).     Patient's current living/housing situation involves staying with someone.  The immediate members of family and household include Padmini Bustillo, 52,Mom, dad and uncle and they report that housing is not stable due to dad's drinking Ashleigh will leave and go to boyfriend house. Patient identified pets; friends; other as part of their support system.  Patient identified the quality of these relationships as fair.      Patient reports engaging in the  following recreational/leisure activities: making crafts, collecting [employer] cards, YouTube, dancing.  Patient is currently employed fulltime- unlicensed dental assistant, I kind of want to quit because it is giving me anxiety and stress and I do not know if it is the right field for me.  Patient reports their income is obtained through employment; parents; partner.  Patient does identify finances as a current stressor.      Patient denies substance related arrests or legal issues.  They are not under any current court jurisdiction. .    Patient's Strengths and Limitations:  Patient identified the following strengths or resources that will help them succeed in treatment: friends / good social support, family support, and motivation. Things that may interfere with the patient's success in treatment include: none identified.     Assessments:  The following assessments were completed by patient for this visit:  PHQ9:       7/21/2024    11:46 PM 8/30/2024     8:02 AM 12/5/2024     7:38 AM 12/10/2024    12:52 PM 2/19/2025     5:20 AM 4/22/2025     9:33 AM 4/29/2025     8:38 AM   PHQ-9 SCORE   PHQ-9 Total Score MyChart 10 (Moderate depression) 6 (Mild depression) 16 (Moderately severe depression) 15 (Moderately severe depression) 5 (Mild depression) 10 (Moderate depression) 10 (Moderate depression)   PHQ-9 Total Score 10 6 16  15  5  10  10        Patient-reported    Proxy-reported     GAD7:       11/2/2022     1:50 PM 12/28/2022    11:53 AM 2/1/2023    10:31 AM 3/17/2023     9:11 AM 1/21/2024     9:31 PM 4/19/2025     2:17 PM 4/29/2025     9:00 AM   ASIA-7 SCORE   Total Score 6 (mild anxiety) 10 (moderate anxiety) 8 (mild anxiety) 5 (mild anxiety) 9 (mild anxiety) 12 (moderate anxiety)    Total Score 6 10 8 5 9 12  9       Patient-reported     CAGE-AID:       7/24/2023    12:48 PM 4/29/2025     9:00 AM   CAGE-AID Total Score   Total Score 3 4   Total Score MyChart 3 (A total score of 2 or greater is considered clinically  significant)      PROMIS 10-Global Health (all questions and answers displayed):       11/13/2023     9:04 PM 2/18/2024     4:11 PM 5/31/2024    10:30 AM 7/12/2024    10:48 AM 12/5/2024     7:39 AM 4/19/2025     2:18 PM 4/29/2025     8:51 AM   PROMIS 10   In general, would you say your health is: Fair Good Fair  Fair Fair Fair   In general, would you say your quality of life is: Good Good Good  Fair Good Fair   In general, how would you rate your physical health? Fair Good Fair  Fair Fair Fair   In general, how would you rate your mental health, including your mood and your ability to think? Fair Fair Good  Poor Fair Fair   In general, how would you rate your satisfaction with your social activities and relationships? Very good Very good Very good  Good Excellent Excellent   In general, please rate how well you carry out your usual social activities and roles Good Good Good  Poor Good Fair   To what extent are you able to carry out your everyday physical activities such as walking, climbing stairs, carrying groceries, or moving a chair? Completely Completely Completely  Mostly Completely Completely   In the past 7 days, how often have you been bothered by emotional problems such as feeling anxious, depressed, or irritable? Often Sometimes Rarely  Often Always Often   In the past 7 days, how would you rate your fatigue on average? None Mild Mild  Moderate Moderate Mild   In the past 7 days, how would you rate your pain on average, where 0 means no pain, and 10 means worst imaginable pain? 0 0 0  0 0 0   In general, would you say your health is: 2 3 2 2 2 2 2   In general, would you say your quality of life is: 3 3 3 3 2 3 2   In general, how would you rate your physical health? 2 3 2 3 2 2 2   In general, how would you rate your mental health, including your mood and your ability to think? 2 2 3 2 1 2 2   In general, how would you rate your satisfaction with your social activities and relationships? 4 4 4 5 3 5 5    In general, please rate how well you carry out your usual social activities and roles. (This includes activities at home, at work and in your community, and responsibilities as a parent, child, spouse, employee, friend, etc.) 3 3 3 4 1 3 2   To what extent are you able to carry out your everyday physical activities such as walking, climbing stairs, carrying groceries, or moving a chair? 5 5 5 5 4 5 5   In the past 7 days, how often have you been bothered by emotional problems such as feeling anxious, depressed, or irritable? 4 3 2 4 4 5 4   In the past 7 days, how would you rate your fatigue on average? 1 2 2 2 3 3 2   In the past 7 days, how would you rate your pain on average, where 0 means no pain, and 10 means worst imaginable pain? 0 0 0 0 0 0 0   Global Mental Health Score 11 12 14 12 8 11  11    Global Physical Health Score 17 17 16 17 14 15  16    PROMIS TOTAL - SUBSCORES 28 29 30 29 22 26  27        Patient-reported     Colbert Suicide Severity Rating Scale (Lifetime/Recent)      2/27/2025     3:34 PM 4/29/2025     9:00 AM   Colbert Suicide Severity Rating (Lifetime/Recent)   Q1 Wished to be Dead (Past Month) 1-->yes    Q2 Suicidal Thoughts (Past Month) 1-->yes    Q3 Suicidal Thought Method 0-->no    Q4 Suicidal Intent without Specific Plan 1-->yes    Q5 Suicide Intent with Specific Plan 0-->no    Q6 Suicide Behavior (Lifetime) 1-->yes    If yes to Q6, within past 3 months? 0-->no    Level of Risk per Screen high risk    1. Wish to be Dead (Lifetime)  Y   Wish to be Dead Description (Lifetime)  --   1. Wish to be Dead (Past 1 Month)  Y   2. Non-Specific Active Suicidal Thoughts (Lifetime)  Y   2. Non-Specific Active Suicidal Thoughts (Past 1 Month)  Y   Non-Specific Active Suicidal Thought Description (Past 1 Month)  --   3. Active Suicidal Ideation with any Methods (Not Plan) Without Intent to Act (Lifetime)  Y   3. Active Suicidal Ideation with any Methods (Not Plan) Without Intent to Act (Past 1  Month)  N   4. Active Suicidal Ideation with Some Intent to Act, Without Specific Plan (Lifetime)  Y   4. Active Suicidal Ideation with Some Intent to Act, Without Specific Plan (Past 1 Month)  N   5. Active Suicidal Ideation with Specific Plan and Intent (Lifetime)  Y   5. Active Suicidal Ideation with Specific Plan and Intent (Past 1 Month)  N   Calculated C-SSRS Risk Score (Lifetime/Recent)  Moderate Risk     GAIN-sliding scale:      4/29/2025     9:00 AM   When was the last time that you had significant problems...   with feeling very trapped, lonely, sad, blue, depressed or hopeless about the future? Past month   with sleep trouble, such as bad dreams, sleeping restlessly, or falling asleep during the day? Past Month   with feeling very anxious, nervous, tense, scared, panicked or like something bad was going to happen? Past month   with becoming very distressed & upset when something reminded you of the past? 2 to 12 months ago   with thinking about ending your life or committing suicide? Past month          4/29/2025     9:00 AM   When was the last time that you did the following things 2 or more times?   Lied or conned to get things you wanted or to avoid having to do something? 2 to 12 months ago   Had a hard time paying attention at school, work or home? Past month   Had a hard time listening to instructions at school, work or home? 1+ years ago   Were a bully or threatened other people? 1+ years ago   Started physical fights with other people? Never     Personal and Family Medical History:  Patient does report a family history of mental health concerns.  Patient reports family history includes Diabetes in Ashleigh's maternal grandmother; Scoliosis in Ashleigh's sister..      Patient reported the following previous mental health diagnoses: an anxiety disorder; depression.  Patient reports their primary mental health symptoms include:  general sense of worry and worry about specific things (work), sleep trouble  (not being able to stay asleep) and these do impact Ashleigh's ability to function.   Patient received mental health services in the past: therapy; psychiatry  .  Psychiatric Hospitalizations: none. Patient denies a history of civil commitment.  Current mental health services/providers include:  I have an appointment with a therapist however I have not gone yet. I am getting assessed for ADHD and Autism. Currently seeing a psychiatrist through [facility].     Patient has had a physical exam to rule out medical causes for current symptoms.  Date of last physical exam was within the past year. Client was encouraged to follow up with PCP if symptoms were to develop. The patient has a [facility] Primary Care Provider, who is named Santo Escamilla.  Patient reports no current medical and/or dental concerns.  Patient denies any issues with pain.. Patient denies pregnancy..  There are significant appetite / nutritional concerns / weight changes- noticing some boredom eating and more snacky, cravings are for more salty and spicy foods.  Patient does  report a history of an eating disorder- history of restricting and also induced vomiting, I have not forced myself to puke or restricted in awhile so I am not that concerned. Patient does not report a history of head injury / trauma / cognitive impairment.      Patient reports current meds as:   Current Outpatient Medications   Medication Sig Dispense Refill    ARIPiprazole (ABILIFY) 5 MG tablet TAKE 1 TABLET(5 MG) BY MOUTH DAILY 30 tablet 0    levonorgestrel-ethinyl estradiol (SEASONALE) 0.15-0.03 MG tablet Take 1 tablet by mouth daily. 91 tablet 3    propranolol (INDERAL) 10 MG tablet TAKE 1 TABLET(10 MG) BY MOUTH TWICE DAILY AS NEEDED FOR ANXIETY 60 tablet 0    venlafaxine (EFFEXOR XR) 150 MG 24 hr capsule TAKE 1 CAPSULE(150 MG) BY MOUTH DAILY 90 capsule 1    venlafaxine (EFFEXOR XR) 75 MG 24 hr capsule TAKE 1 CAPSULE(75 MG) BY MOUTH DAILY IN  ADDITION TO EFFEXOR  MG MAKING A. TOTAL DAILY DOSE  MG 30 capsule 0     No current facility-administered medications for this encounter.     Medication Adherence:  Patient reports taking. taking psychiatric medications as prescribed.   Patient is able to self-administer medications.    Patient Allergies:  No Known Allergies    Medical History:    Past Medical History:   Diagnosis Date    Anorexia 03/2022    Anxiety     Depressive disorder     Mild episode of recurrent major depressive disorder     Suicide attempt (H)     at least once, hospitalized     Substance Use:   Patient reported the following biological family members or relatives with chemical health issues:  dad- alcohol, oldest brother- alcohol, cannabis and possibly others. Uncles on dad's side of the family. Patient has not received substance use disorder and/or gambling treatment in the past.  Patient has not ever been to detox.  Patient is not currently receiving any chemical dependency treatment. Patient reports they have attended the following support groups: AA in the past.      Substance History of use Age of first use Pattern and duration of use (include amounts and frequency) Date of last use     [facility] of administration   Alcohol currently use   17 2-3x per week: 2-5 drinks. Very heavy pours of alcohol top off with a small amount of mixer (Vodka). Will also have a beer or two at times.  04/28/25 Yes oral   Cannabis   currently use 20 Daily use: all day, cartridge will last 1-2 weeks. I am not getting high, my tolerance is too high so I am  using and it is baseline. 2mg cartridge.  04/28/25 Yes smoked     Amphetamines   never used   Denies        Cocaine/crack    never used    Denies         Hallucinogens used in the past   23  Every once in awhile I will use shrooms. I only use with people I am close with.  04/13/24  No oral   Inhalants never used      Denies         Heroin never used      Denies         Other  Opiates never used   Denies        Benzodiazepine   never used   Denies        Barbiturates never used   Denies        Over the counter meds never used   Denies        Caffeine currently use 8 Red Bull when I am tired maybe 1 can irregularly. I am not a big caffeine lover.    No oral   Nicotine  currently use 23 Vape- Geek bar, lasting about a month. 50 mg 04/29/25 Yes smoked   other substances not listed above:  Identify:  never used   Denies        Patient reports obtaining substances from [email or web address] store.  Patient reported the following problems as a result of their substance use:academic; financial problems; relationship problems  .  Patient is concerned about substance use, I am concerned that my health is not the best it can me. I get medical anxiety about my use and think that my liver is going to fail or my lungs are going to collapse and that scares me. Patient reports mom is concerned about their substance use.  Patient reports their recovery goals are some moderation management and education about substance use.     Patient reports experiencing the following withdrawal symptoms within the past 12 months: agitation, headache, fatigue, sad/depressed feeling, vivid/unpleasant dreams, irritability, nausea/vomiting, dizziness, diarrhea, diminished appetite, fever, confused/disrupted speech, and anxiety/worry and the following within the past 30 days: agitation, headache, fatigue, sad/depressed feeling, vivid/unpleasant dreams, irritability, nausea/vomiting, dizziness, diarrhea, diminished appetite, fever, confused/disrupted speech, and anxiety/worry.  Patients reports urges to use Alcohol and Cannabis/ Hashish.  Patient reports Ashleigh has used more Alcohol and Cannabis/ Hashish than intended and over a longer period of time than intended. Patient reports Ashleigh has had unsuccessful attempts to cut down or control use of Alcohol and Cannabis/ Hashish.  Patient reports longest period of abstinence was 1 day and  return to use was due to cravings to use. Patient reports Ashleigh has needed to use more Alcohol and Cannabis/ Hashish to achieve the same effect.  Patient does  report diminished effect with use of same amount of Alcohol and Cannabis/ Hashish.     Patient does  report a great deal of time is spent in activities necessary to obtain, use, or recover from Alcohol and Cannabis/ Hashish effects.  Patient does not report important social, occupational, or recreational activities are given up or reduced because of Alcohol and Cannabis/ Hashish use.  Alcohol and Cannabis/ Hashish use is continued despite knowledge of having a persistent or recurrent physical or psychological problem that is likely to have caused or exacerbated by use.  Patient reports the following problem behaviors while under the influence of substances I have a tolerance so I do not get wild any more, I am aware of how I am feeling and doing.      Patient reports substance use has not ever impacted their ability to function in a school setting. Patient reports substance use has not ever impacted their ability to function in a work setting.  Patients demographics and history impact their recovery in the following ways: Ashleigh resides with their father who is currently in active addiction. Ashleigh is able to leave and go to their boyfriends house if needed.  Patient reports engaging in the following recreation/leisure activities while using: hanging out at home and with friends.  Patient reports the following people are supportive of recovery: boyfriend, cousin, mother and sister    Patient does have a history of gambling concerns and/or treatment- I will purchase mystery bags and pokemon grab bags.  Patient does  have other addictive behaviors Ashleigh is concerned about shopping addiction- I will purchase whatever I want whenever I want it. Hx of pornography however with current partner we discussed not watching porn so I have not been.      Patient  preferences: near home, okay with mixed or all female genders, young adult group,     Dimension Scale Ratings:    Dimension 1 -  Acute Intoxication/Withdrawal: 1 - Minor Problem Last use of alcohol was on 4/28/25 and cannabis was 4/28/25. Ashleigh has had no period of sobriety recently so is at risk for withdrawal. Ashleigh and I discussed detox and emergency services if needed.   Dimension 2 - Biomedical: 0 - No Problem Ashleigh reports no biomedical concerns. Ashleigh has insurance and can access care as needed.   Dimension 3 - Emotional/Behavioral/Cognitive Conditions: 2 - Moderate Problem Ashleigh does have diagnosis of anxiety and depression and shared with me that they will be engaging in ADHD and autism testing in the coming weeks. Ashleigh has difficulty with impulse control and mental health management. Ashleigh is working with a psychiatrist and is wanting a individual therapy referral. Ashleigh did not endorse any SI/SIB however did endorse some homicidal thoughts with no plans, intent or current means. Ashleigh verbally contracted for safety and verbalized willingness to seek emergency attention if needed.   Dimension 4 - Readiness to Change:  1 - Minor Problem Ashleigh was calm and cooperative through this assessment. Ashleigh has motivation from family to engage in programing.   Dimension 5 - Relapse/Continued Use/ Continued Problem Potential: 4 - Extreme Problem Ashleigh has had no periods of sobriety through use. Ashleigh reports daily use of cannabis and regular use of alcohol. Ashleigh has minimal insight into use patterns and how use impacts mental health. At this time there is no relapse prevention plan. Ashleigh is at high risk for continued use.   Dimension 6 - Recovery Environment:  1 - Minor Problem Ashleigh is working however feels that their place of employment is stressful and hopes to find new employment. Ashleigh has some supportive family and friends however most social setting she engages in involve use. Ashleigh reports there is active  use in the home from their father. No current or pending legal charges.        Significant Losses / Trauma / Abuse / Neglect Issues:   Patient did not  serve in the .  There are not indications or report of significant loss, trauma, abuse or neglect issues related to: are no indications and client denies any losses, trauma, abuse, or neglect concerns.  Patient has not been a victim of exploitation.  Concerns for possible neglect are not present.     Safety Assessment:   Patient reports current homicidal ideation and behaviors.  Onset: Recently, frequency: 1x every couple of days, duration: 10 minutes, and intensity: 7/10 (10 being highest).  Client denies intention to act on homicidal ideation.  . -Has some ruminating thoughts from past situations that lead me to want to harm her (boyfriends sister) but no new or current events. When I feel dismissed I feel the intentions getting higher. I want to act out on them.   -I use to have thoughts about harming sister and that was years ago but nothing at this time. It was more when we did not get along. We lived together  -Sometimes it is one off people, if I feel like something happened I will fantasize about harming them and sometimes that helps and others it makes it more intense. I feel like I will not act on it because I know the consequences- however sometimes I am concerned about my impulse control.    -When I was having a really hard time I thought to kill my dad and then myself. I did not have a set plan but have thought about it.   - I was out to dinner with my family and told them I needed to leave to do homework. I felt dismissed by all of them and grabbed a knife and though about harming them. I didn't because I thought about consequences     Patient reports current and past suicide ideation, plans, intent or attempts.  10-11 years old  See C-SSRS for more detail and safety plan below.  Patient  reports past history of self injury of cutting self and  punching the floor until harm was done on hand. Hospitalized for this in the past.   Patient reported unsafe motor vehicle operation associated with substance use   Patient reported impulsive/compulsive spending behaviors reported gambling reported impulsive decisionmaking reported substance use associated with mental health symptoms.  Patient denied current or past personal safety concerns.    Patient denies past of current/recent assaultive behaviors.    Patient denied a history of sexual assault behaviors.     Patient reports there are, no, they are not secured. firearms in the house- we have the gun box firearms are not in the gun box but they are in a more secure portion of the home.     Patient reports the following protective factors:  forward or future oriented thinking; effectively controls impulses; sense of belonging; purpose; help seeking behaviors when distressed; adherence with prescribed medication; agreement to use safety plan; living with other people; effective problem solving skills; commitment to well being; sense of personal control or determination    Risk Plan:  See Recommendations for Safety and Risk Management Plan    Review of Symptoms per patient report:   Substance Use:  vomiting, hangovers, other substance related medical issue pain in left flank when drinking, daily use, substance use at school, decrease in school performance, family relationship problems due to substance use, social problems related to substance use, driving under the influence, and cravings/urges to use     Collateral Contact Summary:     Name    ***   Relationship    *** Phone Number    *** Releases    {yes no:84205}       Information Provided:      ***      If court related records were reviewed, summarize here: NA    {collateral contacts info:419541}    Information in this assessment was obtained from the medical record and provided by patient who is a good historian.    Patient will have open access to their  mental health medical record.    Diagnostic Criteria: 1.) Alcohol/drug is often taken in larger amounts or over a longer period than was intended.  Met for Alcohol and Cannabis.  2.) There is a persistent desire or unsuccessful efforts to cut down or control alcohol/drug use.  Met for Cannabis.  3.) A great deal of time is spent in activities necessary to obtain alcohol, use alcohol, or recover from its effects.  Met for Alcohol and Cannabis.  4.) Craving, or a strong desire or urge to use alcohol/drug.  Met for Alcohol and Cannabis.  5.) Recurrent alcohol/drug use resulting in a failure to fulfill major role obligations at work, school or home.  Met for Alcohol and Cannabis.  6.) Continued alcohol use despite having persistent or recurrent social or interpersonal problems caused or exacerbated by the effects of alcohol/drug.  Met for Alcohol and Cannabis.  7.) Important social, occupational, or recreational activities are given up or reduced because of alcohol/drug use.  Met for Alcohol and Cannabis.  8.) Recurrent alcohol/drug use in situations in which it is physically hazardous.  Met for Alcohol and Cannabis.  9.) Alcohol/drug use is continued despite knowledge of having a persistent or recurrent physical or psychological problem that is likely to have been caused or exacerbated by alcohol.  Met for Alcohol and Cannabis.  10.) Tolerance, as defined by either of the following: A need for markedly increased amounts of alcohol/drug to achieve intoxication or desired effect..  Met for Alcohol and Cannabis.    As evidenced by self report and criteria, client meets the following DSM5 Diagnoses:   (Sustained by DSM5 Criteria Listed Above)  Alcohol Use Disorder   303.90 (F10.20) Severe    304.30 (F12.20) Cannabis Use Disorder Severe    .    Functional Status:  Patient reports the following functional impairments:  academic performance, relationship(s), and social interactions.     LARNO/DUAL Programmatic Care:  1. Does  "the patient have a history of vulnerability such as being teased, picked on, or other indications of potential safety issues with other residents?  No    2. Does this patient have a history of being the victim of abuse? No history of abuse reported or documented.    3. Does this patient have a history of victimizing others?  Ashleigh reports she was an \"online bully in the past\"     4. Does the patient have a history of boundary violations?  No.    5. Does the patient have a history of other sexual acting out behaviors (e.g grooming)?   No    6. Does the patient have a history of threats to self or others? Fire setting, running away or other self-injurious behaviors?    No    7. Does the patient s history indicate the need for special precautions or particular staffing patterns in the facility?  No    NOTE: If this screening indicates that the patient is at risk to harm self or others, notify staff at referral location.    Recommendations:     1. Plan for Safety and Risk Management:  Recommended that patient call 911 or go to the local ED should there be a change in any of these risk factors.      Report to child / adult protection services was NA.     2. LARON Referrals:   Recommendations: Patient meets criteria for the following levels of care based on ASAM Criteria:  Withdrawal Management - No Withdrawal Management Indicated, Treatment/Recovery Services - {ASAM Criteria Treatment Levels of Care:451635}.  {OP BEH DEMONSTRATION 1115 PLACEMENT:306129}   *** .  Patient reports they {ARE/ARE NOT:106998} willing to follow these recommendations.  Patient would like the following family or other support people involved in their treatment:  NA. Patient does not have a history of opiate use.    3. Mental Health Referrals:  Individual Therapy     4. Clinical Substantiation/medical necessity for the above recommendations:  ***.    5. Patient's identified no culturally specific needs related to treatment placement.     6. " Recommendations for treatment focus:   Depressed Mood - Patient endorses depression  Anxiety - Patient endorses anxiety  Risk Management / Safety Concerns related to: Other harm ideation - Ashleigh has a history of homicidal ideation with no plan or intent and also has a history of suicide attempt  Alcohol / Substance Use - Alcohol use disorder, severe and cannabis use disorder, severe .     7.  Interactive Complexity: No    8. Safety Plan:  {Please complete the [employer] Safety Plan and refresh when completed Link to [employer] Safety Plan form  :191740}    Deepak Safety Plan      Creation Date: 4/29/25       Step 1: Warning signs:    Warning Signs    Getting increasingly irratable    Not doing daily tasks and routines      Step 2: Internal coping strategies - Things I can do to take my mind off my problems without contacting another person:    Strategies    Watching Movie Mouth videos    Listening to uplifting music    Spend time with friends and boyfriend      Step 3: People and social settings that provide distraction:    Name Contact Information    friends     boyftiend        Places    My room and closet      Step 4: People whom I can ask for help during a crisis:      Step 5: Professionals or agencies I can contact during a crisis:      Suicide Prevention Lifeline Phone: Call or Text 253  Crisis Text Line: Text HOME to 144110     Step 6: Making the environment safer (plan for lethal means safety):   Did not identify any lethal methods     Optional: What is most important to me and worth living for?:      Deepak Safety Plan. Davina Westfall and Rodo Mitchell. Used with permission of the authors.       Provider Name/ Credentials:  Allyssa Ramos, [facility]  April 29, 2025

## 2025-04-29 NOTE — PROGRESS NOTES
Individual Session Summary   START TIME: 9:30 AM   END TIME: 11:00 AM    Duration: 1 hour 30 minutes            Data:  Met with client on this date for an individual substance use assessment. We completed the substance use assessment and signed ROIs. We discussed the purpose of collateral contacts, patient was agreeable to this writer reaching out and signed ROIs.      Intervention: Motivational interviewing to complete individual substance use assessment     Assessment: Patient was engaged during the assessment.      Plan: This writer will complete the LARON assessment and follow up with their collateral contact. Patient will be referred to program based on assessment outcome.         MYRON Sotelo  4/29/2025

## 2025-04-29 NOTE — TELEPHONE ENCOUNTER
Called [facility] in [place]. They received an order for Naltrexone on 4/22/25 but it was closed out by provider on 4/28/25. They need a new order.     Routed to provider to advise        BREANNA DIA RN on 4/29/2025 at 9:47 AM

## 2025-05-01 ENCOUNTER — PATIENT OUTREACH (OUTPATIENT)
Dept: CARE COORDINATION | Facility: CLINIC | Age: 25
End: 2025-05-01
Payer: COMMERCIAL

## 2025-05-06 ENCOUNTER — VIRTUAL VISIT (OUTPATIENT)
Dept: PSYCHIATRY | Facility: CLINIC | Age: 25
End: 2025-05-06
Payer: COMMERCIAL

## 2025-05-06 VITALS — BODY MASS INDEX: 23 KG/M2 | WEIGHT: 125 LBS | HEIGHT: 62 IN

## 2025-05-06 DIAGNOSIS — F12.20 CANNABIS DEPENDENCE (H): ICD-10-CM

## 2025-05-06 DIAGNOSIS — F33.1 MAJOR DEPRESSIVE DISORDER, RECURRENT EPISODE, MODERATE (H): Primary | ICD-10-CM

## 2025-05-06 DIAGNOSIS — F10.20 UNCOMPLICATED ALCOHOL DEPENDENCE (H): ICD-10-CM

## 2025-05-06 DIAGNOSIS — F41.9 ANXIETY: ICD-10-CM

## 2025-05-06 RX ORDER — ARIPIPRAZOLE 5 MG/1
5 TABLET ORAL DAILY
Qty: 90 TABLET | Refills: 0 | Status: SHIPPED | OUTPATIENT
Start: 2025-05-06

## 2025-05-06 RX ORDER — VENLAFAXINE HYDROCHLORIDE 75 MG/1
CAPSULE, EXTENDED RELEASE ORAL
Qty: 30 CAPSULE | Refills: 0 | Status: SHIPPED | OUTPATIENT
Start: 2025-05-06

## 2025-05-06 RX ORDER — VENLAFAXINE HYDROCHLORIDE 150 MG/1
150 CAPSULE, EXTENDED RELEASE ORAL DAILY
Qty: 90 CAPSULE | Refills: 1 | Status: SHIPPED | OUTPATIENT
Start: 2025-05-06

## 2025-05-06 RX ORDER — GABAPENTIN 100 MG/1
100 CAPSULE ORAL 3 TIMES DAILY
Qty: 270 CAPSULE | Refills: 0 | Status: SHIPPED | OUTPATIENT
Start: 2025-05-06

## 2025-05-06 RX ORDER — PROPRANOLOL HYDROCHLORIDE 10 MG/1
TABLET ORAL
Qty: 60 TABLET | Refills: 0 | Status: CANCELLED | OUTPATIENT
Start: 2025-05-06

## 2025-05-06 ASSESSMENT — ANXIETY QUESTIONNAIRES
IF YOU CHECKED OFF ANY PROBLEMS ON THIS QUESTIONNAIRE, HOW DIFFICULT HAVE THESE PROBLEMS MADE IT FOR YOU TO DO YOUR WORK, TAKE CARE OF THINGS AT HOME, OR GET ALONG WITH OTHER PEOPLE: SOMEWHAT DIFFICULT
4. TROUBLE RELAXING: SEVERAL DAYS
3. WORRYING TOO MUCH ABOUT DIFFERENT THINGS: SEVERAL DAYS
GAD7 TOTAL SCORE: 12
2. NOT BEING ABLE TO STOP OR CONTROL WORRYING: NEARLY EVERY DAY
8. IF YOU CHECKED OFF ANY PROBLEMS, HOW DIFFICULT HAVE THESE MADE IT FOR YOU TO DO YOUR WORK, TAKE CARE OF THINGS AT HOME, OR GET ALONG WITH OTHER PEOPLE?: SOMEWHAT DIFFICULT
7. FEELING AFRAID AS IF SOMETHING AWFUL MIGHT HAPPEN: MORE THAN HALF THE DAYS
5. BEING SO RESTLESS THAT IT IS HARD TO SIT STILL: SEVERAL DAYS
GAD7 TOTAL SCORE: 12
3. WORRYING TOO MUCH ABOUT DIFFERENT THINGS: SEVERAL DAYS
2. NOT BEING ABLE TO STOP OR CONTROL WORRYING: NEARLY EVERY DAY
GAD7 TOTAL SCORE: 12
1. FEELING NERVOUS, ANXIOUS, OR ON EDGE: NEARLY EVERY DAY
8. IF YOU CHECKED OFF ANY PROBLEMS, HOW DIFFICULT HAVE THESE MADE IT FOR YOU TO DO YOUR WORK, TAKE CARE OF THINGS AT HOME, OR GET ALONG WITH OTHER PEOPLE?: SOMEWHAT DIFFICULT
GAD7 TOTAL SCORE: 12
1. FEELING NERVOUS, ANXIOUS, OR ON EDGE: NEARLY EVERY DAY
4. TROUBLE RELAXING: SEVERAL DAYS
GAD7 TOTAL SCORE: 12
IF YOU CHECKED OFF ANY PROBLEMS ON THIS QUESTIONNAIRE, HOW DIFFICULT HAVE THESE PROBLEMS MADE IT FOR YOU TO DO YOUR WORK, TAKE CARE OF THINGS AT HOME, OR GET ALONG WITH OTHER PEOPLE: SOMEWHAT DIFFICULT
7. FEELING AFRAID AS IF SOMETHING AWFUL MIGHT HAPPEN: MORE THAN HALF THE DAYS
5. BEING SO RESTLESS THAT IT IS HARD TO SIT STILL: SEVERAL DAYS
7. FEELING AFRAID AS IF SOMETHING AWFUL MIGHT HAPPEN: MORE THAN HALF THE DAYS
6. BECOMING EASILY ANNOYED OR IRRITABLE: SEVERAL DAYS
6. BECOMING EASILY ANNOYED OR IRRITABLE: SEVERAL DAYS

## 2025-05-06 ASSESSMENT — PATIENT HEALTH QUESTIONNAIRE - PHQ9
SUM OF ALL RESPONSES TO PHQ QUESTIONS 1-9: 11
10. IF YOU CHECKED OFF ANY PROBLEMS, HOW DIFFICULT HAVE THESE PROBLEMS MADE IT FOR YOU TO DO YOUR WORK, TAKE CARE OF THINGS AT HOME, OR GET ALONG WITH OTHER PEOPLE: SOMEWHAT DIFFICULT
SUM OF ALL RESPONSES TO PHQ QUESTIONS 1-9: 11

## 2025-05-06 ASSESSMENT — PAIN SCALES - GENERAL: PAINLEVEL_OUTOF10: NO PAIN (0)

## 2025-05-06 NOTE — NURSING NOTE
Current patient location: [address] [place] [id number]    Is the patient currently in the state of [place]? YES    Visit mode: VIDEO    If the visit is dropped, the patient can be reconnected by:VIDEO VISIT: Text to cell phone:   Telephone Information:   Mobile [phone]       Will anyone else be joining the visit? NO  (If patient encounters technical issues they should call [phone])    Are changes needed to the allergy or medication list? No    Are refills needed on medications prescribed by this physician? YES    Rooming Documentation:  Questionnaire(s) completed    Reason for visit: RECHECK    Violetta BAILON

## 2025-05-06 NOTE — PATIENT INSTRUCTIONS
"Patient Education   [facility]  What to Expect  Here's what to expect in the [facility].   About the program  You'll be meeting with a psychiatric doctor to check your mental health. A psychiatric doctor helps you deal with troubling thoughts and feelings by giving you medicine. They'll make sure you know the plan for your care. You may see them for a long time. When you're feeling better, they may refer you back to seeing your family doctor.   If you have any questions, we'll be glad to talk to you.  About visits  Be open  At your visits, please talk openly about your problems. It may feel hard, but it's the best way for us to help you.  Cancelling visits  If you can't come to your visit, please call us right away at [phone]. If you don't cancel at least 24 hours (1 full day) before your visit, that's \"late cancellation.\"  Not showing up for your visits  Being very late is the same as not showing up. You'll be a \"no show\" if:  You're more than 15 minutes late for a 30-minute (half hour) visit.  You're more than 30 minutes late for a 60-minute (full hour) visit.  If you cancel late or don't show up 2 times within 6 months, we may end your care.  Getting help between visits  If you need help between visits, you can call us Monday to Friday from 8 a.m. to 4:30 p.m. at [phone].  Emergency care  Call 911 or go to the nearest emergency department if your life or someone else's life is in danger.  Call 988 anytime to reach the national Suicide and Crisis hotline.  Medicine refills  To refill your medicine, call your pharmacy. You can also call [facility] at [phone], Monday to Friday, 8 a.m. to 4:30 p.m. It can take 1 to 3 business days to get a refill.   Forms, letters, and tests  You may have papers to fill out, like FMLA, short-term disability, and workability. We can help you with these forms at your visits, but you must have an " appointment. You may need more than 1 visit for this, to be in an intensive therapy program, or both.  Before we can give you medicine for ADHD, we may refer you to get tested for it or confirm it another way.  We may not be able to give you an emotional support animal letter.  We don't do mental health checks ordered by the court.   We don't do mental health testing, but we can refer you to get tested.   Thank you for choosing us for your care.  For informational purposes only. Not to replace the advice of your health care provider. Copyright   2022 [facility]. All rights reserved. [employer] 422699 - Rev 11/24.     Plan:  1.Patient will take the medications as prescribed.   Medications: Continue Effexor 150 mg daily in addition to EFFEXOR 75 mg making a total daily dose of 225 mg for depression and anxiety  Continue Abilify 5 mg daily for mood, depression and anxiety  Take gabapentin 100 mg three times daily  Stop propranolol 10 mg twice daily  Patient will not stop taking medications or adjust them without consulting with the provider.  2.Patient will call with any problems between visits.  3.Patient will go to the emergency room if not feeling safe , unable to function in the community, or if suicidal, homicidal or hearing voices or having paranoia.  4.Patient will abstain from drugs and alcohol./Pt denies use .  5.Patient will not drive if sedated on medications or under influence of any substance.  6.Patient will not mix psychiatric medications with drugs and alcohol.   7.Patient will watch his diet and exercise.  8.Patient will see non psychiatric providers for non psychiatric disorders.  9. Next appointment in 6 weeks

## 2025-05-06 NOTE — PROGRESS NOTES
"PSYCHIATRIC PROGRESS NOTE     Name:  Ashleigh Bustillo  : 2000    Ashleigh Bustillo is a 23 year old female who is being evaluated via a billable Video visit.      Telemedicine Visit: The patient's condition can be safely assessed and treated via synchronous audio and visual telemedicine encounter.      Reason for Telemedicine Visit: COVID 19 pandemic and the social and physical recommendations by the CDC and MD., Patient has requested telehealth visit, and Patient unable to travel      Originating Site (Patient Location): Patient's home    Distant Site (Provider Location): [facility] Outpatient Setting: [facility]    Consent:  The patient/guardian has verbally consented to: the potential risks and benefits of telemedicine (video visit or phone) versus in person care; bill my insurance or make self-payment for services provided; and responsibility for payment of non-covered services.     Mode of Communication:  [employer] MS platform     As the provider I attest to compliance with applicable laws and regulations related to telemedicine.    Date of Last Visit: 25                                         CHIEF COMPLAINT   \"I'm still struggling with anxiety    HISTORY OF PRESENT ILLNESS     Patient who was last seen in the clinic on 25 returned today for follow up visit.  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  Patient states she has noticed increased anxiety related to stressful work environment.  Patient reports she struggles with excessive worry every morning before going to work.  Patient states depression is manageable at this time.  Patient reports she has completed substance use evaluation.  Patient stated she is currently working with an addiction medicine providers.  Patient states she is still engages in vaping and daily cannabis use but stating alcohol use has decreased a little bit.  Patient endorsed passive SI but denies plan or intent.  She also denies " both auditory and visual hallucination.  Patient report no marbella or hypomania.  Patient return to clinic in 6 weeks for follow-up.   PSYCHIATRIC HISTORY:   History of Psychiatric Hospitalizations:   - Inpatient: Once when she was 14 years old  - IOP/PHP/Day treatment: None  History of Suicidal Ideation: Positive   History of Suicide Attempts:Positive   History of Self-injurious Behavior: Denies a history of SIB.  Current:  No  History of Violence/Aggression: Negative  History of Commitment? Negative  Electroconvulsive Therapy (ECT):Negative    PSYCHIATRIC REVIEW OF SYSTEMS:   Psychiatric Review of Systems:   Depression:   Reports: depressed mood, suicidal ideation, decreased interest, changes in sleep, changes in appetite, guilt, hopelessness, helplessness, impaired concentration, decreased energy, irritability.  Marbella:   Denies: sleeplessness, increased goal-directed activities, abrupt increase in energy pressured speech  Psychosis:   Denies: visual hallucinations, auditory hallucinations, paranoia  Anxiety:   Reports: excessive worries that are difficult to control, panic attacks  PTSD:   Reports: re-experiencing past trauma, nightmares, increased arousal, avoidance of traumatic stimuli, impaired function.  Denies: re-experiencing past trauma, nightmares, increased arousal, avoidance of traumatic stimuli, impaired function.  OCD:   Denies: obsessions, checking, symmetry, cleaning, skin picking.  Eating Disorder:   Denies: restriction, binging, purging.    Sleep:       MEDICATIONS                                                                                              One   Current Outpatient Medications   Medication Sig Dispense Refill    ARIPiprazole (ABILIFY) 5 MG tablet TAKE 1 TABLET(5 MG) BY MOUTH DAILY 30 tablet 0    levonorgestrel-ethinyl estradiol (SEASONALE) 0.15-0.03 MG tablet Take 1 tablet by mouth daily. 91 tablet 3    naltrexone (DEPADE/REVIA) 50 MG tablet Take 1 tablet (50 mg) by mouth daily. Take  "1/2 tablet (25 mg) daily for 1 week, then take 1 tablet (50 mg ) daily. 30 tablet 0    propranolol (INDERAL) 10 MG tablet TAKE 1 TABLET(10 MG) BY MOUTH TWICE DAILY AS NEEDED FOR ANXIETY 60 tablet 0    venlafaxine (EFFEXOR XR) 150 MG 24 hr capsule TAKE 1 CAPSULE(150 MG) BY MOUTH DAILY 90 capsule 1    venlafaxine (EFFEXOR XR) 75 MG 24 hr capsule TAKE 1 CAPSULE(75 MG) BY MOUTH DAILY IN ADDITION TO EFFEXOR  MG MAKING A. TOTAL DAILY DOSE  MG 30 capsule 0     No current facility-administered medications for this visit.       DRUG MONITORING:  [place] Prescription Monitoring Program evaluating controlled substances in the last year in [place]:  The [place] Prescription Monitoring Program has been reviewed and there are no current concerns with: diversionary activity, early refill requests, and or obtaining the medication from multiple providers       PAST PSYCHOTROPIC MEDICATIONS:  Prozac, Effexor    VITALS   Ht 1.575 m (5' 2\")   Wt 56.7 kg (125 lb)   LMP 04/14/2025   BMI 22.86 kg/m       BP Readings from Last 1 Encounters:   04/28/25 110/64     Pulse Readings from Last 1 Encounters:   04/28/25 102     Wt Readings from Last 1 Encounters:   05/06/25 56.7 kg (125 lb)     Ht Readings from Last 1 Encounters:   05/06/25 1.575 m (5' 2\")     Estimated body mass index is 22.86 kg/m  as calculated from the following:    Height as of this encounter: 1.575 m (5' 2\").    Weight as of this encounter: 56.7 kg (125 lb).      PERTINENT HISTORY   PAST MEDICAL HISTORY:   Past Medical History:   Diagnosis Date    Anorexia 03/2022    Anxiety     Depressive disorder     Mild episode of recurrent major depressive disorder     Suicide attempt (H)     at least once, hospitalized       PAST SURGICAL HISTORY:   Past Surgical History:   Procedure Laterality Date    LAPAROSCOPIC APPENDECTOMY N/A 2/27/2025    Procedure: APPENDECTOMY, LAPAROSCOPIC;  Surgeon: Margie Renteria DO;  Location: [facility] Main OR       FAMILY HISTORY:   Family " "History   Problem Relation Age of Onset    Scoliosis Sister     Diabetes Maternal Grandmother        SOCIAL HISTORY:   Social History     Tobacco Use    Smoking status: Passive Smoke Exposure - Never Smoker     Passive exposure: Current    Smokeless tobacco: Never    Tobacco comments:     Daily vaping now   Substance Use Topics    Alcohol use: Yes     Comment: once a month drinks 1-2         Seizures or Head Injury: Denies history of head injury. Denies history of seizures.  History of cardiac disease, rheumatic fever, fainting or dizziness, especially with exercise, seizures, chest pain or shortness of breath with exercise, unexplained change in exercise tolerance, palpitations, high blood pressure, or heart murmur?   No    LABS & IMAGING                                                                                                                Personally reviewed  Recent Labs   Lab Test 04/08/22  1219   WBC 6.4   HGB 13.1   HCT 38.5   MCV 88      ANEU 3.6     Recent Labs   Lab Test 04/08/22  1219      POTASSIUM 3.9   CHLORIDE 107   CO2 21*   GLC 77   SUE 9.0   BUN 8   CR 0.64   GFRESTIMATED >90   ALBUMIN 3.9   PROTTOTAL 7.5   AST 14   ALT 10   ALKPHOS 59   BILITOTAL 0.6     Recent Labs   Lab Test 04/08/22  1219   CHOL 124   LDL 46   HDL 63   TRIG 73     Recent Labs   Lab Test 08/12/24  1348   TSH 0.90     No results found for: \"ZLX281\", \"DCFT917\", \"EBQX23KIMED\", \"VITD3\", \"D2VIT\", \"D3VIT\", \"DTOT\", \"[id number]\", \"[id number]\", \"[id number]\", \"[id number]\", \"[id number]\", \"[id number]\"     ALLERGY & IMMUNIZATIONS     No Known Allergies    FAMILY MEDICAL HISTORY:     Family History       Problem (# of Occurrences) Relation (Name,Age of Onset)    Diabetes (1) Maternal Grandmother (Danielle Sutherland)    Scoliosis (1) Sister              Family history of sudden or unexplained death or an event requiring resuscitation in children or young adults, cardiac arrhythmias (eg, Malika-Parkinson-White syndrome), long QT " syndrome, catecholaminergic paroxysmal ventricular tachycardia, Brugada syndrome, arrhythmogenic right ventricular dysplasia, hypertrophic cardiomyopathy, dilated cardiomyopathy, or Marfan syndrome?  No    FAMILY PSYCHIATRIC HISTORY:   Psychiatry:Denies  Substance use history in family: Brother and day struggled with drug abuse  Family suicide history: Denies      SIGNIFICANT SOCIAL/FAMILY HISTORY:                                           Born and raised in: [place]  Relationship status: Single, has a boyfriend  Children: None    Highest education level was:currently in college   Service:Denies   Employment status: Part time working at the [employer]  LEGAL:Denies     SUBSTANCE USE HISTORY    Tobacco use: Vapes occasionally  Caffeine: Denies  Current alcohol: 2-3 drinks once weekly  Current substance use:Marijuana  Past use alcohol/substance use:Marrijuanna      MEDICAL REVIEW OF SYSTEMS:   Ten system review was completed with pertinent positives noted above    MENTAL STATUS EXAM:   Mental Status Examination (limited due to video virtual visit format):  Vital Signs: There were no vitals taken for this virtual visit.  Appearance: adequately groomed, appears stated age, and in no apparent distress.  Attitude: cooperative   Eye Contact: good to the extent that can be determined in a video visit  Muscle Strength and Tone: no gross abnormalities based on remote observation  Psychomotor Behavior:  no evidence of tardive dyskinesia, dystonia, or tics based on remote observation  Gait and Station: normal, no gross abnormalities based on remote observation  Speech: clear, coherent, normal prosody, regular rate, regular rhythm and fluent  Associations: No loosening of associations  Thought Process: coherent and goal directed  Thought Content: no evidence of suicidal ideation or homicidal ideation, no evidence of psychotic thought, no auditory hallucinations present and no visual hallucinations  "present  Mood: \"good\"  Affect: appropriate and in normal range  Insight: good  Judgment: intact, adequate for safety  Impulse Control: intact  Oriented to: time, place, person and situation  Attention Span and Concentration: normal  Language: Intact  Recent and Remote Memory: intact to interview. Not formally assessed. No amnesia.  Fund of Knowledge: appropriate        SAFETY   Feels safe in home: Yes   Suicidal ideation: Denies  History of suicide attempts:  No   Hx of impulsivity: No     DSM 5 DIAGNOSIS:   1. Recurrent major depressive disorder, in partial remission (H)    2. Anxiety    ASSESSMENT AND PLAN    Patient is a 23 year old,   Not  or  female  with a history of generalized anxiety disorder, major depressive disorder, and eating disorder female  who presents for follow up visit.  Patient recently underwent LARON evaluation was diagnosed with cannabis dependence and uncomplicated alcohol dependence.  She is currently seen by addiction medicine provider and recently started on naltrexone to help with alcohol abuse.  She has been experiencing increased anxiety lately due to stressful work environment.  Depression is about the same both manageable at this time.  I discontinued propranolol due to ineffectiveness after the patient on gabapentin 100 mg 3 times daily for anxiety and mood.  I discussed medical side effect, risk, benefit with the patient.  Patient verbalized good understanding was amenable to taking gabapentin to further help with anxiety.  She reports cutting down on alcohol was 3 utilizes cannabis daily.  She will be starting individual psychotherapy next week.  She denies both suicidal and homicidal ideation. She  endorsed passive SI but denies plan or intent. RTC in 6 weeks    Plan:  1.Patient will take the medications as prescribed.   Medications: Continue Effexor 150 mg daily in addition to EFFEXOR 75 mg making a total daily dose of 225 mg for depression and anxiety  Continue " Abilify 5 mg daily for mood, depression and anxiety  Take gabapentin 100 mg three times daily  Stop propranolol 10 mg twice daily  Patient will not stop taking medications or adjust them without consulting with the provider.  2.Patient will call with any problems between visits.  3.Patient will go to the emergency room if not feeling safe , unable to function in the community, or if suicidal, homicidal or hearing voices or having paranoia.  4.Patient will abstain from drugs and alcohol./Pt denies use .  5.Patient will not drive if sedated on medications or under influence of any substance.  6.Patient will not mix psychiatric medications with drugs and alcohol.   7.Patient will watch his diet and exercise.  8.Patient will see non psychiatric providers for non psychiatric disorders.  9. Next appointment in 6 weeks    Risk Assessment:     Ashleigh has notable risk factors for self-harm, including, single status, anxiety and passive SI. However, risk is mitigated by ability to volunteer a safety plan and history of seeking help when needed. Additional steps taken to minimize risk include making medication adjustment, asking patient to call 911 and go to the ER if not able to stay safe at home,  Therefore, based on all available evidence including the factors cited above, Ashleigh does not appear to be an imminent danger to self or others and does not meet criteria 72 hour hold. However, if patient uses substances or is non-adherent with medication, their risk of decompensation and SI/HI will be elevated. This was discussed with the patient as she verbalized good understanding.   CONSULTS/REFERRALS:   Continue therapy  None at this time  Coordinate care with therapist as needed    MEDICAL:   None at this time  Coordinate care with PCP (Lauren Sen) as needed  Follow up with primary care provider as planned or for acute medical concerns.    PSYCHOEDUCATION:  Medication side effects and alternatives reviewed. Health  promotion activities recommended and reviewed today. All questions addressed. Education and counseling completed regarding risks and benefits of medications and psychotherapy options.  Consent provided by patient/guardian  Call the psychiatric nurse line with medication questions or concerns at [phone].  IRL Gaming[employer] may be used to communicate with your provider, but this is not intended to be used for emergencies.  BLACK BOX WARNING: Discussed the Food and Drug Administration (FDA) requires that all antidepressants carry a warning that some children, adolescents and young adults may be at increased risk of suicide when taking antidepressants. Anyone taking an antidepressant should be watched closely for worsening depression or unusual behavior especially in the first few weeks after starting an SSRI. Keep in mind, antidepressants are more likely to reduce suicide risk in the long run by improving mood.   SEROTONIN SYNDROME:  Discussed risks of Serotonin syndrome (ie, serotonin toxicity) which is a potentially life-threatening condition associated with increased serotonergic activity in the central nervous system (CNS). It is seen with therapeutic medication use, inadvertent interactions between drugs, and intentional self-poisoning. Serotonin syndrome may involve a spectrum of clinical findings, which often include mental status changes, autonomic hyperactivity, and neuromuscular abnormalities.    BENZODIAZEPINE:  discussion on how benzos work and the need to use them short term due to potential of anxiety getting.  This is a controlled substance with risk for abuse, need to keep in a safe keep place and cannot replace lost scripts.    HYPNOTIC USE: Hypnotic use, risk for CNS depression, sleep-walking, not to mix with ETOH or other CNS depressant, need for six hours of sleep, stop if change in mood.  This is a controlled substance with risk for abuse, need to keep in a safe keep place and cannot replace lost  scripts.  FIRST GENERATION ANTIPSYCHOTIC/ SECOND GENERATION ANTIPSYCHOTIC USE:  Atypical need for cardiometabolic monitoring with medication- B/P, weight, blood sugar, cholesterol.  Need to monitor for abnormal movements taught  SAFETY:  We all care about your loved one's safety. To reduce the risk of self-harm, remove access to all:  Firearms, Medicines (both prescribed and over-the-counter), Knives and other sharp objects, Ropes and like materials, and Alcohol  SLEEP HYGIENE: establish a sleep routine, limit screen time 1 hour prior to bed, use bed for sleep only, take sleep/medications on time (including sleepy time tea, trazadone or herbal treatments such as melatonin), aroma therapy, limit caffeine/sugar, yoga, guided imagery, stretch, meditation, limit naps to 20 minutes, make a temperature change in the room, white noise, be mindful of slowing down breathing, take a warm bath/shower, frequently wash sheets, and journaling.   Medlineplus.gov is information for patients.  It is run by the [employer] and it contains information about all disorders, diseases and all medications.      COMMUNITY RESOURCES:    CRISIS NUMBERS: Provided in AVS 7/24/2023  National Suicide Prevention Lifeline: 0-675-342-TALK (121-186-9711)  [employer]/resources for a list of additional resources (SOS)            [facility] - [phone]   Urgent Care Adult Mental Qtmpji-[phone] mobile unit/ 24/7 crisis line  [facility] -[phone]   COPE 24/7 [place] Mobile Team -[phone] (adults)/ [phone] (child)  Poison Control Center - 1-692.192.2047    OR  go to nearest ER  Crisis Text Line for any crisis 24/7 send this-   To: 578223   [facility] ([facility]) [facility] ER  [phone]  National Suicide Prevention Lifeline: 943.451.1484 (TTY: 460.709.4037). Call anytime for help.  (www.suicidepreventionlifeline.org)  National [place] on Mental Illness (www.[place].org):  971-848-4205 or 619-390-6893.   Mental Health Association (www.mentalhealth.org): 954-663-8926 or 694-984-0543.  [place] Crisis Text Line: Text [place] to 861035  Suicide LifeLine Chat: suicidepreventionlifeline.org/chat    ADMINISTRATIVE BILLIN min spent interviewing patient, reviewing referral documents, obtaining and reviewing outside records, communication with other health specialists, and preparing this report on this day: 25    Video/Phone Start Time:  1035  Video/Phone End Time:   1052    Greater than 50% of time was spent in counseling and coordination of care regarding above diagnoses and treatment plan.    The longitudinal plan of care for the diagnosis(es)/condition(s) as documented were addressed during this visit. Due to the added complexity in care, I will continue to support Ashleigh in the subsequent management and with ongoing continuity of care.     Signed:   Jovan Manjarrez, MSN, APRN, PMHNP-BC  Long Term Outpatient Psychiatry  Chart documentation done in part with Dragon Voice Recognition software.  Although reviewed after completion, some word and grammatical errors may remain. Answers submitted by the patient for this visit:  Patient Health Questionnaire (Submitted on 2023)  If you checked off any problems, how difficult have these problems made it for you to do your work, take care of things at home, or get along with other people?: Very difficult  PHQ9 TOTAL SCORE: 14Answers submitted by the patient for this visit:  Patient Health Questionnaire (Submitted on 10/3/2023)  If you checked off any problems, how difficult have these problems made it for you to do your work, take care of things at home, or get along with other people?: Not difficult at all  PHQ9 TOTAL SCORE: 3    Answers submitted by the patient for this visit:  Patient Health Questionnaire (Submitted on 2023)  If you checked off any problems, how difficult have these problems made it for you to do your work,  take care of things at home, or get along with other people?: Somewhat difficult  PHQ9 TOTAL SCORE: 12    Answers submitted by the patient for this visit:  Patient Health Questionnaire (Submitted on 1/21/2024)  If you checked off any problems, how difficult have these problems made it for you to do your work, take care of things at home, or get along with other people?: Very difficult  PHQ9 TOTAL SCORE: 13  ASIA-7 (Submitted on 1/21/2024)  ASIA 7 TOTAL SCORE: 9    Answers submitted by the patient for this visit:  Patient Health Questionnaire (Submitted on 4/19/2024)  If you checked off any problems, how difficult have these problems made it for you to do your work, take care of things at home, or get along with other people?: Not difficult at all  PHQ9 TOTAL SCORE: 3    Answers submitted by the patient for this visit:  Patient Health Questionnaire (Submitted on 5/31/2024)  If you checked off any problems, how difficult have these problems made it for you to do your work, take care of things at home, or get along with other people?: Somewhat difficult  PHQ9 TOTAL SCORE: 7    Answers submitted by the patient for this visit:  Patient Health Questionnaire (Submitted on 7/12/2024)  If you checked off any problems, how difficult have these problems made it for you to do your work, take care of things at home, or get along with other people?: Very difficult  PHQ9 TOTAL SCORE: 11    Answers submitted by the patient for this visit:  Patient Health Questionnaire (Submitted on 8/30/2024)  If you checked off any problems, how difficult have these problems made it for you to do your work, take care of things at home, or get along with other people?: Somewhat difficult  PHQ9 TOTAL SCORE: 6    Answers submitted by the patient for this visit:  Patient Health Questionnaire (Submitted on 12/10/2024)  If you checked off any problems, how difficult have these problems made it for you to do your work, take care of things at home, or get  along with other people?: Very difficult  PHQ9 TOTAL SCORE: 15    Answers submitted by the patient for this visit:  Patient Health Questionnaire (Submitted on 2/19/2025)  If you checked off any problems, how difficult have these problems made it for you to do your work, take care of things at home, or get along with other people?: Somewhat difficult  PHQ9 TOTAL SCORE: 5    Answers submitted by the patient for this visit:  Patient Health Questionnaire (Submitted on 5/6/2025)  If you checked off any problems, how difficult have these problems made it for you to do your work, take care of things at home, or get along with other people?: Somewhat difficult  PHQ9 TOTAL SCORE: 11  Patient Health Questionnaire (G7) (Submitted on 5/6/2025)  ASIA 7 TOTAL SCORE: 12

## 2025-05-11 ASSESSMENT — PATIENT HEALTH QUESTIONNAIRE - PHQ9
10. IF YOU CHECKED OFF ANY PROBLEMS, HOW DIFFICULT HAVE THESE PROBLEMS MADE IT FOR YOU TO DO YOUR WORK, TAKE CARE OF THINGS AT HOME, OR GET ALONG WITH OTHER PEOPLE: VERY DIFFICULT
SUM OF ALL RESPONSES TO PHQ QUESTIONS 1-9: 14
SUM OF ALL RESPONSES TO PHQ QUESTIONS 1-9: 14

## 2025-05-12 ENCOUNTER — OFFICE VISIT (OUTPATIENT)
Dept: BEHAVIORAL HEALTH | Facility: CLINIC | Age: 25
End: 2025-05-12
Attending: NURSE PRACTITIONER
Payer: COMMERCIAL

## 2025-05-12 DIAGNOSIS — F33.1 MAJOR DEPRESSIVE DISORDER, RECURRENT EPISODE, MODERATE (H): ICD-10-CM

## 2025-05-12 PROCEDURE — 90832 PSYTX W PT 30 MINUTES: CPT | Performed by: MARRIAGE & FAMILY THERAPIST

## 2025-05-12 NOTE — PROGRESS NOTES
[facility] Primary Care: Integrated Behavioral Health    Integrated Behavioral Health   Mental Health & Addiction Services      Progress Note - Initial [facility] Emergency Department Visit     Patient Name: Ashleigh Bustillo    Date: May 12, 2025  Service Type: Individual   Visit Start Time: 804AM  Visit End Time:  828AM   Attendees: Patient   Service Modality: In-person     [facility] Emergency Department Visit Activities (Refresh list every visit): NEW and [facility] Emergency Department Only         DATA:     Interactive Complexity: No   Crisis: No     Assessments completed:     The following assessments were completed by patient for this visit:  PHQ2: No questionnaires on file.  PHQ9:       12/5/2024     7:38 AM 12/10/2024    12:52 PM 2/19/2025     5:20 AM 4/22/2025     9:33 AM 4/29/2025     8:38 AM 5/6/2025     4:26 AM 5/11/2025     9:29 PM   PHQ-9 SCORE   PHQ-9 Total Score MyChart 16 (Moderately severe depression) 15 (Moderately severe depression) 5 (Mild depression) 10 (Moderate depression) 10 (Moderate depression) 11 (Moderate depression) 14 (Moderate depression)   PHQ-9 Total Score 16  15  5  10  10  11  14        Patient-reported    Proxy-reported     GAD2:       5/31/2024    10:29 AM 7/12/2024    10:04 AM 8/30/2024     8:02 AM 12/5/2024     7:38 AM 2/19/2025     5:20 AM 4/19/2025     2:17 PM 5/6/2025     4:27 AM   ASIA-2   Feeling nervous, anxious, or on edge 1 1 1 1 1 3 3   Not being able to stop or control worrying 0 1 1 1 1 2 3   ASIA-2 Total Score 1 2 2 2  2  5  6        Patient-reported     GAD7:       12/28/2022    11:53 AM 2/1/2023    10:31 AM 3/17/2023     9:11 AM 1/21/2024     9:31 PM 4/19/2025     2:17 PM 4/29/2025     9:00 AM 5/6/2025     4:27 AM   ASIA-7 SCORE   Total Score 10 (moderate anxiety) 8 (mild anxiety) 5 (mild anxiety) 9 (mild anxiety) 12 (moderate anxiety)  12 (moderate anxiety)   Total Score 10 8 5 9 12  9 12        Patient-reported     CAGE-AID:       7/24/2023    12:48 PM 4/29/2025     9:00 AM   CAGE-AID Total Score   Total Score 3 4    Total Score MyChart 3 (A total score of 2 or greater is considered clinically significant)      PROMIS 10-Global Health (all questions and answers displayed):       11/13/2023     9:04 PM 2/18/2024     4:11 PM 5/31/2024    10:30 AM 7/12/2024    10:48 AM 12/5/2024     7:39 AM 4/19/2025     2:18 PM 4/29/2025     8:51 AM   PROMIS 10   In general, would you say your health is: Fair Good Fair  Fair Fair Fair   In general, would you say your quality of life is: Good Good Good  Fair Good Fair   In general, how would you rate your physical health? Fair Good Fair  Fair Fair Fair   In general, how would you rate your mental health, including your mood and your ability to think? Fair Fair Good  Poor Fair Fair   In general, how would you rate your satisfaction with your social activities and relationships? Very good Very good Very good  Good Excellent Excellent   In general, please rate how well you carry out your usual social activities and roles Good Good Good  Poor Good Fair   To what extent are you able to carry out your everyday physical activities such as walking, climbing stairs, carrying groceries, or moving a chair? Completely Completely Completely  Mostly Completely Completely   In the past 7 days, how often have you been bothered by emotional problems such as feeling anxious, depressed, or irritable? Often Sometimes Rarely  Often Always Often   In the past 7 days, how would you rate your fatigue on average? None Mild Mild  Moderate Moderate Mild   In the past 7 days, how would you rate your pain on average, where 0 means no pain, and 10 means worst imaginable pain? 0 0 0  0 0 0   In general, would you say your health is: 2 3 2 2 2 2 2   In general, would you say your quality of life is: 3 3 3 3 2 3 2   In general, how would you rate your physical health? 2 3 2 3 2 2 2   In general, how would you rate your mental health, including your mood and your ability to think? 2 2 3 2 1 2 2   In general, how would you rate  your satisfaction with your social activities and relationships? 4 4 4 5 3 5 5   In general, please rate how well you carry out your usual social activities and roles. (This includes activities at home, at work and in your community, and responsibilities as a parent, child, spouse, employee, friend, etc.) 3 3 3 4 1 3 2   To what extent are you able to carry out your everyday physical activities such as walking, climbing stairs, carrying groceries, or moving a chair? 5 5 5 5 4 5 5   In the past 7 days, how often have you been bothered by emotional problems such as feeling anxious, depressed, or irritable? 4 3 2 4 4 5 4   In the past 7 days, how would you rate your fatigue on average? 1 2 2 2 3 3 2   In the past 7 days, how would you rate your pain on average, where 0 means no pain, and 10 means worst imaginable pain? 0 0 0 0 0 0 0   Global Mental Health Score 11 12 14 12 8 11  11    Global Physical Health Score 17 17 16 17 14 15  16    PROMIS TOTAL - SUBSCORES 28 29 30 29 22 26  27        Patient-reported     [place] Suicide Severity Rating Scale (Lifetime/Recent)      2/27/2025     3:34 PM 4/29/2025     9:00 AM   [place] Suicide Severity Rating (Lifetime/Recent)   Q1 Wished to be Dead (Past Month) 1-->yes    Q2 Suicidal Thoughts (Past Month) 1-->yes    Q3 Suicidal Thought Method 0-->no    Q4 Suicidal Intent without Specific Plan 1-->yes    Q5 Suicide Intent with Specific Plan 0-->no    Q6 Suicide Behavior (Lifetime) 1-->yes    If yes to Q6, within past 3 months? 0-->no    Level of Risk per Screen high risk    1. Wish to be Dead (Lifetime)  Y   Wish to be Dead Description (Lifetime)  --   1. Wish to be Dead (Past 1 Month)  Y   2. Non-Specific Active Suicidal Thoughts (Lifetime)  Y   2. Non-Specific Active Suicidal Thoughts (Past 1 Month)  Y   Non-Specific Active Suicidal Thought Description (Past 1 Month)  --   3. Active Suicidal Ideation with any Methods (Not Plan) Without Intent to Act (Lifetime)  Y   3. Active  Suicidal Ideation with any Methods (Not Plan) Without Intent to Act (Past 1 Month)  N   4. Active Suicidal Ideation with Some Intent to Act, Without Specific Plan (Lifetime)  Y   4. Active Suicidal Ideation with Some Intent to Act, Without Specific Plan (Past 1 Month)  N   5. Active Suicidal Ideation with Specific Plan and Intent (Lifetime)  Y   5. Active Suicidal Ideation with Specific Plan and Intent (Past 1 Month)  N   Calculated C-SSRS Risk Score (Lifetime/Recent)  Moderate Risk        Referral:   Patient was referred to [facility] by primary care provider.    Reason for referral: clarify behavioral health diagnosis and determine behavioral health treatment options.      [facility] introduced self and role. Discussed informed consent and limits to confidentiality.     Presenting Concerns/ Current Stressors:   Patient looking for evaluation for mental health diagnosis. Patient worked with therapist in the past in childhood for depression and anxiety: discussed BPD but was too young to diagnose.     2nd therapist in high school for depression and anxiety.     Patient reported difficulty with memory and impairments with remembering.         Therapeutic Interventions:  Motivational Interviewing (MI): Validated patient's thoughts, feelings and experience. Expressed respect for patient's autonomy in decision making.  Asked open-ended questions to invite patient's self-reflection and self-direction around change and what is important for them in working towards their goals.  Expressed and demonstrated empathy through reflective listening.     Response to treatment interventions:   Patient was receptive to interventions utilized.  Patient was engaged in the therapy process.      Safety Issues and Plan for Safety and Risk Management:     Patient has had a history of suicidal ideation: current passive and suicide attempts: at 12 years old   Patient denies current fears or concerns for personal safety.   Patient reports the following  current or recent suicidal ideation or behaviors: passive, SI no thoughts or intent.   Patient denies current or recent homicidal ideation or behaviors.   Patient denies current or recent self injurious behavior or ideation.   Patient denies other safety concerns.   A safety and risk management plan has been developed including: Patient consented to co-developed safety plan.  Safety and risk management plan was completed - see below.  Patient agreed to use safety plan should any safety concerns arise.  A copy was given to the patient.   Patient reports there are firearms in the house. The firearms are not secured in a locked space. Client was advised to secure all firearms.       ASSESSMENT:   Mental Status:     Appearance:   Appropriate    Eye Contact:   Good    Psychomotor Behavior: Normal    Attitude:   Cooperative    Orientation:   All   Speech Rate / Production: Normal/ Responsive   Volume:   Normal    Mood:    Normal   Affect:    Appropriate    Thought Content:  Clear    Thought Form:  Coherent    Insight:    Good         Diagnostic Criteria:   Major Depressive Disorder  CRITERIA (A-C) REPRESENT A MAJOR DEPRESSIVE EPISODE - SELECT THESE CRITERIA  A) Recurrent episode(s) - symptoms have been present during the same 2-week period and represent a change from previous functioning 5 or more symptoms (required for diagnosis)   - Depressed mood. Note: In children and adolescents, can be irritable mood.     - Diminished interest or pleasure in all, or almost all, activities.    - Fatigue or loss of energy.    - Diminished ability to think or concentrate, or indecisiveness.    - Recurrent thoughts of death (not just fear of dying), recurrent suicidal ideation without a specific plan, or a suicide attempt or a specific plan for committing suicide.   B) The symptoms cause clinically significant distress or impairment in social, occupational, or other important areas of functioning  C) The episode is not attributable to  the physiological effects of a substance or to another medical condition  D) The occurence of major depressive episode is not better explained by other thought / psychotic disorders  E) There has never been a manic episode or hypomanic episode        DSM5 Diagnoses: (Sustained by DSM5 Criteria Listed Above)     Diagnoses: 296.32 (F33.1) Major Depressive Disorder, Recurrent Episode, Moderate _     Psychosocial / Contextual Factors: Substance Use history/concerns       Collateral Reports Completed:   Routed note to PCP        PLAN: (Homework, other):     1. Patient was provided:  recommendation to schedule follow-up with [facility] recommendation to follow through on referrals     2. Provider recommended the following referrals: patient will follow up with "[facility] for 3x per week .        3. Suicide Risk and Safety Concerns were assessed for Ashleigh EMIR Bustillo    Safety Plan:   Patient agreed to follow safety plan   [employer] Safety Plan      Creation Date: 4/29/25 Last Update Date: 5/12/25      Step 1: Warning signs:    Warning Signs    Getting increasingly irratable    Not doing daily tasks and routines      Step 2: Internal coping strategies - Things I can do to take my mind off my problems without contacting another person:    Strategies    Watching [employer] videos    Listening to uplifting music    Spend time with friends and boyfriend      Step 3: People and social settings that provide distraction:    Name Contact Information    friends     boyfriend        Places    My room and closet      Step 4: People whom I can ask for help during a crisis:    Name Contact Information    [place] Phone [phone]      Step 5: Professionals or agencies I can contact during a crisis:    Clinician/Agency Name Phone Emergency Contact    [facility] Crisis [phone]       [facility] Emergency Department Emergency Department Address Emergency Department Phone    [address], [place] [address]        Suicide Prevention Lifeline Phone: Call or Text 367  Crisis Text Line: Text HOME to 326524     Step 6: Making the environment safer (plan for lethal means safety):   Did not identify any lethal methods     Optional: What is most important to me and worth living for?:      Deepak Safety Plan. Davina Westfall and Rodo Mitchell. Used with permission of the authors.            Graham Moon [facility], [facility]   May 12, 2025    Answers submitted by the patient for this visit:  Patient Health Questionnaire (Submitted on 5/11/2025)  If you checked off any problems, how difficult have these problems made it for you to do your work, take care of things at home, or get along with other people?: Very difficult  PHQ9 TOTAL SCORE: 14  Patient Health Questionnaire (G7) (Submitted on 5/6/2025)  ASIA 7 TOTAL SCORE: 12

## 2025-05-12 NOTE — PATIENT INSTRUCTIONS
Deepak Safety Plan      Creation Date: 4/29/25 Last Update Date: 5/12/25      Step 1: Warning signs:    Warning Signs    Getting increasingly irratable    Not doing daily tasks and routines      Step 2: Internal coping strategies - Things I can do to take my mind off my problems without contacting another person:    Strategies    Watching [employer] videos    Listening to uplifting music    Spend time with friends and boyfriend      Step 3: People and social settings that provide distraction:    Name Contact Information    friends     boyfriend        Places    My room and closet      Step 4: People whom I can ask for help during a crisis:    Name Contact Information    [place] Phone [phone]      Step 5: Professionals or agencies I can contact during a crisis:    Clinician/Agency Name Phone Emergency Contact    [facility] Crisis [phone]       Local Emergency Department Emergency Department Address Emergency Department Phone    [address], [place] [address]       Suicide Prevention Lifeline Phone: Call or Text 593  Crisis Text Line: Text HOME to 448282     Step 6: Making the environment safer (plan for lethal means safety):   Did not identify any lethal methods     Optional: What is most important to me and worth living for?:      Deepak Safety Plan. Davina Westfall and Rodo Mitchell. Used with permission of the authors.

## 2025-05-13 ENCOUNTER — VIRTUAL VISIT (OUTPATIENT)
Dept: ADDICTION MEDICINE | Facility: CLINIC | Age: 25
End: 2025-05-13
Payer: COMMERCIAL

## 2025-05-13 DIAGNOSIS — F10.20 ALCOHOL USE DISORDER, MODERATE, DEPENDENCE (H): Primary | ICD-10-CM

## 2025-05-13 DIAGNOSIS — F12.20 CANNABIS DEPENDENCE (H): ICD-10-CM

## 2025-05-13 DIAGNOSIS — G47.9 SLEEP DISTURBANCE: ICD-10-CM

## 2025-05-13 DIAGNOSIS — F41.9 ANXIETY: ICD-10-CM

## 2025-05-13 DIAGNOSIS — F33.1 MAJOR DEPRESSIVE DISORDER, RECURRENT EPISODE, MODERATE (H): ICD-10-CM

## 2025-05-13 RX ORDER — GABAPENTIN 100 MG/1
CAPSULE ORAL
Qty: 270 CAPSULE | Refills: 0 | Status: SHIPPED | OUTPATIENT
Start: 2025-05-13

## 2025-05-13 ASSESSMENT — PAIN SCALES - GENERAL: PAINLEVEL_OUTOF10: NO PAIN (0)

## 2025-05-13 ASSESSMENT — PATIENT HEALTH QUESTIONNAIRE - PHQ9: SUM OF ALL RESPONSES TO PHQ QUESTIONS 1-9: 14

## 2025-05-13 NOTE — NURSING NOTE
Is the patient currently in the state of [place]? YES    Current patient location: [address] [place] [id number]    Visit mode:Video    If the visit is dropped, the patient can be reconnected by: VIDEO VISIT: Text to cell phone:   Telephone Information:   Mobile [phone]       Will anyone else be joining the visit? No  (If patient encounters technical issues they should call [phone])    Are changes needed to the allergy or medication list? Pt stated no changes to allergies and Pt stated no med changes    Are refills needed on medications prescribed by this physician? No    Rooming Documentation: Questionnaire(s) completed.    Reason for visit: RECHECK     Darlin Wright, VVF

## 2025-05-13 NOTE — PROGRESS NOTES
Virtual Visit Details    Type of service:  Video Visit   Start 09*05  End 0942  Originating Location (pt. Location): Home    Distant Location (provider location):  On-site  Platform used for Video Visit: Traci

## 2025-05-13 NOTE — PROGRESS NOTES
[facility]    A/P                                                    ASSESSMENT/PLAN    1. Alcohol use disorder, moderate, dependence (H) (Primary)  Started naltrexone last week, tolerating well. No significant changes to her alcohol use at 25 mg daily. She is planning to increase naltrexone to 50 mg today. She completed a isis eval and plans to start IOP and [facility] and doing individual therapy.   She saw [facility] psych provider and gabapentin was added which can provide benefits to her alcohol use.   Encouraged her plans to start IOP  Continue naltrexone 50 mg daily.  - Hepatic panel (Albumin, ALT, AST, Bili, Alk Phos, TP); Future  - gabapentin (NEURONTIN) 100 MG capsule; Take 1 capsule (100 mg) by mouth 2 times daily AND 3 capsules (300 mg) at bedtime.  Dispense: 270 capsule; Refill: 0    2. Anxiety  3. Major depressive disorder, recurrent episode, moderate (H)  Is experiencing periods of increased anxiety triggered by her new job. Is seeing psychiatry and I staking venlafaxine, abilify, and gabapentin.   Continue therapy  - gabapentin (NEURONTIN) 100 MG capsule; Take 1 capsule (100 mg) by mouth 2 times daily AND 3 capsules (300 mg) at bedtime.  Dispense: 270 capsule; Refill: 0    4. Cannabis dependence (H)  Is using THC daily. May be contributing to her anxiety.  NAC has been ordered, encouraged to start  Proceed with plans for IOP    5. Sleep disturbance  Reports difficulty falling and staying asleep. Has tried melatonin which caused RLS. Collaborated with psych, Suresh Manjarrez CNP and gabapentin was increased at bedtime. Consider trazodone or mirtazapine if gabapentin ineffective.       Orders Placed This Encounter   Medications    gabapentin (NEURONTIN) 100 MG capsule     Sig: Take 1 capsule (100 mg) by mouth 2 times daily AND 3 capsules (300 mg) at bedtime.     Dispense:  270 capsule     Refill:  0     No need to refill this medication. We are trying an increased dose at  "bedtime to see if that helps with sleep. If not effective plan to reduce back to 100 mg TID.       Problem list updated Apr 22, 2025   No problems updated.          PDMP Review         Value Time User    State PDMP site checked  Yes 5/13/2025 11:16 AM Ana Levy CNP                         05/06/2025 05/06/2025 1 Gabapentin 100 Mg Capsule 270.00 90 Ak Aye [id number] [employer] ([id number]) 0/0  Comm Ins [place]   03/01/2025 03/01/2025 1 Hydrocodone-Acetamin 5-325 Mg 10.00 2 Si Lue [id number]              RTC  Return in about 2 weeks (around 5/27/2025) for Follow up, using a video visit 0900.      Counseled the patient on the importance of having a recovery program in addition to medication to manage recovery.  Components include avoiding isolating, having willingness to change, avoiding triggers and managing cravings. Encouraged having some type of sober network and practicing honesty with trusted support person(s). Encouraged other services such as counseling, 12 step or other self-help organizations.              SUBJECTIVE                                                    HPI:   Ashleigh Bustillo is a 24 year old female with history of depression, cannabis, nicotine and alochol use who presents for follow up     Brief history: Initial visit 12/5/2024 with Cindy Benito CNP.   Ashleigh's substance use started at age 19 and started using on a regular basis two years ago.  Ashleigh has had thoughts of taking a break from using substances but has not been able to reduce or abstain.  Currently alcohol use is mixed vodka drinks 2-6 drinks every other day, she is using cannabis vaping and flower throughout the day, and nicotine use throughout the day.  Ashleigh reports that her dad struggles with alcohol use and her brother uses \"Harder drugs\" unable to identify.  She has not been to treatment or tried pharmacotherapy options in the past.  She would like to reduce/abstain from all substances.    Naltrexone was started at her intiial appointment. " "She was lost to follow up, RTC 4/22/25.        Substance Use History:   \"Have you ever had any history with [...] use?\" And \"When was your last use?  ALCOHOL - every other day  CANNABIS - daily  PRESCRIPTION STIMULANTS (includes Ritalin, Adderall, Vyvanse) - denies  COCAINE/CRACK - denies  METH/AMPHETAMINES (includes ecstacy, MDMA/patrice) - denies  OPIATES - denies  BENZODIAZEPINES (includes Ativan, Klonopin, Xanax) - denies  KRATOM (mild opioid and stimulant effects) - denies  KETAMINE - denies  HALLUCINOGENS (includes DXM) - experimental   BEHAVIORAL (Gambling, Eating d/o, Compulsivity) - anorexia   History of treatment - denies  NICOTINE  Cigarettes: none  Chew/snus: none  Vaping: current,   Past NRT/medication use: none         Previous withdrawal treatment episodes (e.g. detox): denies  Previous [facility] treatment programs: denies  Hospitalizations or overdose: inpatient 14 years old  Medical complications from substance use: unknown  IV Drug use?: denies  Previous Medication for Addiction Tx: denies  Longest period of full abstinence:  none  Activities that have previously supported abstinence: none  Current Recovery Activities: none          TODAY'S VISIT.    HPI May 13, 2025    Started naltrexone is tolerating well without side effects. Has not noticed a change in her alcohol use yet. Roger has been taking 25 mg for the past week and plans to increase to 50 mg daily today.   Completed laron eval and is planning to go to IOP at [facility].   Started individual therapy yesterday. Will be getting   Has been feeling more anxious, and depression.   Her job is triggering her anxiety. 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   Seeing provider at  psych, gabapentin started, Venlafaxine increased    Is not sleeping well. Waking multiple times at night. Has tried melatonin which causes her RLS      Recent HPI Details: 4/22/2025     1. " Alcohol use disorder, moderate, dependence (H)  Reports she was able to start naltrexone after her initial appointment in December, and was able to reduce her alcohol use while taking but her use resumed prior level after she ran out of medications. She would like to resume naltrexone with the goal of reduction in use, and is interested in IOP.   Referral placed for isis eval.   Resume naltrexone, titrate to 50 mg daily  Patient denies withdrawal history, reviewed symptoms of withdrawal and recommend that she go to ED for severe symptoms.   Follow up 3 weeks  Check hepatic profile at follow up  - Adult Mental Health  Referral  - [facility]  Referral; Future  - naltrexone (DEPADE/REVIA) 50 MG tablet; Take 1 tablet (50 mg) by mouth daily. Take 1/2 tablet daily for six days, then increase to one full tablet  Dispense: 30 tablet; Refill: 1    2. Cannabis dependence (H)  Naltrexone may provide benefits. Starting NAC 1200 mg BID.   Proceed with isis eval  - Adult Mental Health  Referral  - Adult Mental Health  Referral; Future  - naltrexone (DEPADE/REVIA) 50 MG tablet; Take 1 tablet (50 mg) by mouth daily. Take 1/2 tablet daily for six days, then increase to one full tablet  Dispense: 30 tablet; Refill: 1  - acetylcysteine (N-ACETYL CYSTEINE) 600 MG CAPS capsule; Take 2 capsules (1,200 mg) by mouth 2 times daily.  Dispense: 60 capsule; Refill: 0    3. Major depressive disorder, recurrent episode, moderate (H)  Reporting stable mood. Is taking Abilify, propranolol, and effexor prescribed by  psychiatry. Referral placed for therapy  - Adult Mental Health  Referral  - Adult Mental Health  Referral; Future     2. Cannabis dependence (H)  -needs improvement.   - Adult [email or web address] Referral  - naltrexone (DEPADE/REVIA) 50 MG tablet; Take 1 tablet (50 mg) by mouth daily. Take 1/2 tablet daily for six days, then increase to one full tablet  Dispense: 30  tablet; Refill: 1  - Adult Mental Health  Referral; Future  NAC  I recommend using N-acetylcystine (aka NAC). This is a supplement that can be purchased at any [employer], [employer], or . If it works, it will help reduce cravings to use cannabis     The lowest recommended dose is 600mg twice daily. I suggest you start with this dose and see if you notice any effects, positive or otherwise. There is not a clear side-effect profile to warn you about. Please talk with a pharmacist where you  your medications to ask about any further side-effect concerns.     3. Major depressive disorder, recurrent episode, moderate (H)  -needs improvement  -continue follow up with psychiatry Suresh Josseline and follow all recommendaitons  - Adult Mental Health  Referral  - Adult Mental Health  Referral; Future     4. Nicotine dependence due to vaping non-tobacco product  -needs improvement   - nicotine (NICODERM CQ) 14 MG/24HR 24 hr patch; Place 1 patch over 24 hours onto the skin every 24 hours.  Dispense: 30 patch; Refill: 1  - nicotine (NICORETTE) 2 MG gum; Place 1 each (2 mg) inside cheek as needed for nicotine withdrawal symptoms.  Dispense: 110 each; Refill: 2               5/6/2025     4:26 AM 5/11/2025     9:29 PM 5/13/2025     8:57 AM   PHQ   PHQ-9 Total Score 11  14  14   Q9: Thoughts of better off dead/self-harm past 2 weeks Several days Several days Several days   F/U: Thoughts of suicide or self-harm Yes Yes    F/U: Self harm-plan No No    F/U: Self-harm action No No    F/U: Safety concerns No No        Patient-reported       OBJECTIVE                                                    PHYSICAL EXAM:  [facility] 04/14/2025       Physical Exam  Pulmonary:      Effort: Pulmonary effort is normal. No respiratory distress.   Skin:     Coloration: Skin is not jaundiced.   Neurological:      General: No focal deficit present.      Mental Status: Ashleigh is alert and oriented to person, place, and  time.   Psychiatric:         Attention and Perception: Attention normal.         Mood and Affect: Mood normal.         Speech: Speech normal.         Behavior: Behavior is cooperative.         Thought Content: Thought content normal.         Judgment: Judgment normal.         LAB      HISTORY                                                    Problem list reviewed & adjusted, as indicated.  Patient Active Problem List   Diagnosis    Anorexia    Anxiety    Recurrent major depressive disorder, in partial remission    History of suicide attempt    Cannabis dependence (H)    Major depressive disorder, recurrent episode, moderate (H)    Alcohol dependence (H)    Perforated appendicitis         MEDICATION LIST (prior to visit)  Current Outpatient Medications   Medication Sig Dispense Refill    gabapentin (NEURONTIN) 100 MG capsule Take 1 capsule (100 mg) by mouth 2 times daily AND 3 capsules (300 mg) at bedtime. 270 capsule 0    ARIPiprazole (ABILIFY) 5 MG tablet Take 1 tablet (5 mg) by mouth daily. 90 tablet 0    gabapentin (NEURONTIN) 100 MG capsule Take 1 capsule (100 mg) by mouth 3 times daily. 270 capsule 0    levonorgestrel-ethinyl estradiol (SEASONALE) 0.15-0.03 MG tablet Take 1 tablet by mouth daily. 91 tablet 3    naltrexone (DEPADE/REVIA) 50 MG tablet Take 1 tablet (50 mg) by mouth daily. Take 1/2 tablet (25 mg) daily for 1 week, then take 1 tablet (50 mg ) daily. 30 tablet 0    venlafaxine (EFFEXOR XR) 150 MG 24 hr capsule Take 1 capsule (150 mg) by mouth daily. 90 capsule 1    venlafaxine (EFFEXOR XR) 75 MG 24 hr capsule TAKE 1 CAPSULE(75 MG) BY MOUTH DAILY IN ADDITION TO EFFEXOR  MG MAKING A. TOTAL DAILY DOSE  MG 30 capsule 0     No current facility-administered medications for this visit.       MEDICATION LIST (after visit)  Current Outpatient Medications   Medication Sig Dispense Refill    gabapentin (NEURONTIN) 100 MG capsule Take 1 capsule (100 mg) by mouth 2 times daily AND 3 capsules (300  mg) at bedtime. 270 capsule 0    ARIPiprazole (ABILIFY) 5 MG tablet Take 1 tablet (5 mg) by mouth daily. 90 tablet 0    gabapentin (NEURONTIN) 100 MG capsule Take 1 capsule (100 mg) by mouth 3 times daily. 270 capsule 0    levonorgestrel-ethinyl estradiol (SEASONALE) 0.15-0.03 MG tablet Take 1 tablet by mouth daily. 91 tablet 3    naltrexone (DEPADE/REVIA) 50 MG tablet Take 1 tablet (50 mg) by mouth daily. Take 1/2 tablet (25 mg) daily for 1 week, then take 1 tablet (50 mg ) daily. 30 tablet 0    venlafaxine (EFFEXOR XR) 150 MG 24 hr capsule Take 1 capsule (150 mg) by mouth daily. 90 capsule 1    venlafaxine (EFFEXOR XR) 75 MG 24 hr capsule TAKE 1 CAPSULE(75 MG) BY MOUTH DAILY IN ADDITION TO EFFEXOR  MG MAKING A. TOTAL DAILY DOSE  MG 30 capsule 0     No current facility-administered medications for this visit.         No Known Allergies     Continued Complex Management  The longitudinal plan of care for Alcohol Use Disorder (AUD) was addressed during this visit. Due to the added complexity in care, I will continue to support Ashleigh in the subsequent management and with ongoing continuity of care.        Ana Levy, ANTHONY,MSN, AGNP-C  [facility]   [address] , [address]   [place] [address]   Phone # [phone]

## 2025-05-21 ENCOUNTER — MYC REFILL (OUTPATIENT)
Dept: MIDWIFE SERVICES | Facility: CLINIC | Age: 25
End: 2025-05-21
Payer: COMMERCIAL

## 2025-05-21 ENCOUNTER — TELEPHONE (OUTPATIENT)
Dept: BEHAVIORAL HEALTH | Facility: CLINIC | Age: 25
End: 2025-05-21
Payer: COMMERCIAL

## 2025-05-21 DIAGNOSIS — Z30.41 ENCOUNTER FOR SURVEILLANCE OF CONTRACEPTIVE PILLS: ICD-10-CM

## 2025-05-21 RX ORDER — LEVONORGESTREL AND ETHINYL ESTRADIOL 0.15-0.03
1 KIT ORAL DAILY
Qty: 91 TABLET | Refills: 3 | OUTPATIENT
Start: 2025-05-21

## 2025-05-27 ENCOUNTER — VIRTUAL VISIT (OUTPATIENT)
Dept: ADDICTION MEDICINE | Facility: CLINIC | Age: 25
End: 2025-05-27
Payer: COMMERCIAL

## 2025-05-27 DIAGNOSIS — F41.9 ANXIETY: ICD-10-CM

## 2025-05-27 DIAGNOSIS — F33.1 MAJOR DEPRESSIVE DISORDER, RECURRENT EPISODE, MODERATE (H): ICD-10-CM

## 2025-05-27 DIAGNOSIS — G47.9 SLEEP DISTURBANCE: ICD-10-CM

## 2025-05-27 DIAGNOSIS — F10.20 ALCOHOL USE DISORDER, MODERATE, DEPENDENCE (H): Primary | ICD-10-CM

## 2025-05-27 DIAGNOSIS — F12.20 CANNABIS DEPENDENCE (H): ICD-10-CM

## 2025-05-27 RX ORDER — NALTREXONE HYDROCHLORIDE 50 MG/1
50 TABLET, FILM COATED ORAL DAILY
Qty: 30 TABLET | Refills: 1 | Status: SHIPPED | OUTPATIENT
Start: 2025-05-27

## 2025-05-27 ASSESSMENT — PAIN SCALES - GENERAL: PAINLEVEL_OUTOF10: MILD PAIN (1)

## 2025-05-27 NOTE — PROGRESS NOTES
Virtual Visit Details    Type of service:  Video Visit   Start 0848  End 904  Originating Location (pt. Location): Home    Distant Location (provider location):  Off-site  Platform used for Video Visit: [email or web address]    A/P                                                    ASSESSMENT/PLAN    1. Alcohol use disorder, moderate, dependence (H) (Primary)  Improving. Is taking 50 mg of naltrexone daily and has reduced her alcohol use. Is scheduled for intake at [facility] for IOP tonight but may not be able to continue programmatic care due to out of pocket costs.   Discussed attendance at peer led support groups. Resources provided.   Continue naltrexone 50 mg daily  - naltrexone (DEPADE/REVIA) 50 MG tablet; Take 1 tablet (50 mg) by mouth daily.  Dispense: 30 tablet; Refill: 1    2. Cannabis dependence (H)  Has not started NAC yet, medication unavailable at her pharmacy. May get from other sources without RX.  Encouraged recovery activities     3. Anxiety  4. Major depressive disorder, recurrent episode, moderate (H)  Mood is improving, anxiety decreasing. Is taking gabapentin and Effexor  prescribed by psychiatry.   Will start therapy at [facility]    5. Sleep disturbance  Sleeping has improved with increasing gabapentin to 300 mg at bedtime          Orders Placed This Encounter   Medications    naltrexone (DEPADE/REVIA) 50 MG tablet     Sig: Take 1 tablet (50 mg) by mouth daily.     Dispense:  30 tablet     Refill:  1       Problem list updated Apr 22, 2025   No problems updated.          PDMP Review         Value Time User    State PDMP site checked  Yes 5/13/2025 11:16 AM Ana Levy CNP                         05/06/2025 05/06/2025 1 Gabapentin 100 Mg Capsule 270.00 90 Ak Aye [id number] [place] (1601) 0/0  Comm Ins [place]   03/01/2025 03/01/2025 1 Hydrocodone-Acetamin 5-325 Mg 10.00 2 Si Lue [id number]              RTC  Return in about 1 month (around 6/30/2025) for 4  "pm.      Counseled the patient on the importance of having a recovery program in addition to medication to manage recovery.  Components include avoiding isolating, having willingness to change, avoiding triggers and managing cravings. Encouraged having some type of sober network and practicing honesty with trusted support person(s). Encouraged other services such as counseling, 12 step or other self-help organizations.              SUBJECTIVE                                                    HPI:   Ashleigh Bustillo is a 24 year old female with history of depression, cannabis, nicotine and alochol use who presents for follow up     Brief history: Initial visit 12/5/2024 with Cindy Benito CNP.   Ashleigh's substance use started at age 19 and started using on a regular basis two years ago.  Ashleigh has had thoughts of taking a break from using substances but has not been able to reduce or abstain.  Currently alcohol use is mixed vodka drinks 2-6 drinks every other day, she is using cannabis vaping and flower throughout the day, and nicotine use throughout the day.  Ashleigh reports that her dad struggles with alcohol use and her brother uses \"Harder drugs\" unable to identify.  She has not been to treatment or tried pharmacotherapy options in the past.  She would like to reduce/abstain from all substances.    Naltrexone was started at her intiial appointment. She was lost to follow up, RTC 4/22/25.        Substance Use History:   \"Have you ever had any history with [...] use?\" And \"When was your last use?  ALCOHOL - every other day  CANNABIS - daily  PRESCRIPTION STIMULANTS (includes Ritalin, Adderall, Vyvanse) - denies  COCAINE/CRACK - denies  METH/AMPHETAMINES (includes ecstacy, MDMA/patrice) - denies  OPIATES - denies  BENZODIAZEPINES (includes Ativan, Klonopin, Xanax) - denies  KRATOM (mild opioid and stimulant effects) - denies  KETAMINE - denies  HALLUCINOGENS (includes DXM) - experimental   BEHAVIORAL (Gambling, Eating d/o, " Compulsivity) - anorexia   History of treatment - denies  NICOTINE  Cigarettes: none  Chew/snus: none  Vaping: current,   Past NRT/medication use: none         Previous withdrawal treatment episodes (e.g. detox): denies  Previous [facility] treatment programs: denies  Hospitalizations or overdose: inpatient 14 years old  Medical complications from substance use: unknown  IV Drug use?: denies  Previous Medication for Addiction Tx: denies  Longest period of full abstinence:  none  Activities that have previously supported abstinence: none  Current Recovery Activities: none          TODAY'S VISIT.    HPI May 27, 2025    Has increased naltrexone to 50 mg, has been able to reduce her alcohol use. Has drank 3 times since her last visit.   Yesterday drank 1 shot and 1 seltzer.  Has intake tonight to start IOP at [facility] May not be able to start because of her out of pocket expense.   Did not receive NAC, not available at her pharmacy.   Mood has been okay, did forget to take her medications for 2 days.   Will be leaving her job, and will be starting a new job at a [employer] 6/3/25. Her anxiety has improved with the decisions to change jobs  Gabapentin is helping to reduce her anxiety, is taking 300 mg at bedtime. Is sleeping well.       Recent HPI Details: 5/13/25  1. Alcohol use disorder, moderate, dependence (H) (Primary)  Started naltrexone last week, tolerating well. No significant changes to her alcohol use at 25 mg daily. She is planning to increase naltrexone to 50 mg today. She completed a isis eval and plans to start IOP and [facility] and doing individual therapy.   She saw [facility] psych provider and gabapentin was added which can provide benefits to her alcohol use.   Encouraged her plans to start IOP  Continue naltrexone 50 mg daily.  - Hepatic panel (Albumin, ALT, AST, Bili, Alk Phos, TP); Future  - gabapentin (NEURONTIN) 100 MG capsule; Take 1 capsule (100 mg) by mouth 2 times daily AND 3 capsules (300 mg) at  bedtime.  Dispense: 270 capsule; Refill: 0    2. Anxiety  3. Major depressive disorder, recurrent episode, moderate (H)  Is experiencing periods of increased anxiety triggered by her new job. Is seeing psychiatry and I staking venlafaxine, abilify, and gabapentin.   Continue therapy  - gabapentin (NEURONTIN) 100 MG capsule; Take 1 capsule (100 mg) by mouth 2 times daily AND 3 capsules (300 mg) at bedtime.  Dispense: 270 capsule; Refill: 0    4. Cannabis dependence (H)  Is using THC daily. May be contributing to her anxiety.  NAC has been ordered, encouraged to start  Proceed with plans for IOP    5. Sleep disturbance  Reports difficulty falling and staying asleep. Has tried melatonin which caused RLS. Collaborated with psych, Suresh Manjarrez CNP and gabapentin was increased at bedtime. Consider trazodone or mirtazapine if gabapentin ineffective.                5/6/2025     4:26 AM 5/11/2025     9:29 PM 5/13/2025     8:57 AM   PHQ   PHQ-9 Total Score 11  14  14   Q9: Thoughts of better off dead/self-harm past 2 weeks Several days Several days Several days   F/U: Thoughts of suicide or self-harm Yes Yes    F/U: Self harm-plan No No    F/U: Self-harm action No No    F/U: Safety concerns No No        Patient-reported       OBJECTIVE                                                    PHYSICAL EXAM:  [facility] 04/14/2025       Physical Exam  Pulmonary:      Effort: Pulmonary effort is normal. No respiratory distress.   Skin:     Coloration: Skin is not jaundiced.   Neurological:      General: No focal deficit present.      Mental Status: Ashleigh is alert and oriented to person, place, and time.   Psychiatric:         Attention and Perception: Attention normal.         Mood and Affect: Mood normal.         Speech: Speech normal.         Behavior: Behavior is cooperative.         Thought Content: Thought content normal.         Judgment: Judgment normal.         LAB      HISTORY                                                    Problem  list reviewed & adjusted, as indicated.  Patient Active Problem List   Diagnosis    Anorexia    Anxiety    Recurrent major depressive disorder, in partial remission    History of suicide attempt    Cannabis dependence (H)    Major depressive disorder, recurrent episode, moderate (H)    Alcohol dependence (H)    Perforated appendicitis         MEDICATION LIST (prior to visit)  Current Outpatient Medications   Medication Sig Dispense Refill    ARIPiprazole (ABILIFY) 5 MG tablet Take 1 tablet (5 mg) by mouth daily. 90 tablet 0    gabapentin (NEURONTIN) 100 MG capsule Take 1 capsule (100 mg) by mouth 2 times daily AND 3 capsules (300 mg) at bedtime. 270 capsule 0    gabapentin (NEURONTIN) 100 MG capsule Take 1 capsule (100 mg) by mouth 3 times daily. 270 capsule 0    levonorgestrel-ethinyl estradiol (SEASONALE) 0.15-0.03 MG tablet Take 1 tablet by mouth daily. 91 tablet 3    naltrexone (DEPADE/REVIA) 50 MG tablet Take 1 tablet (50 mg) by mouth daily. 30 tablet 1    venlafaxine (EFFEXOR XR) 150 MG 24 hr capsule Take 1 capsule (150 mg) by mouth daily. 90 capsule 1    venlafaxine (EFFEXOR XR) 75 MG 24 hr capsule TAKE 1 CAPSULE(75 MG) BY MOUTH DAILY IN ADDITION TO EFFEXOR  MG MAKING A. TOTAL DAILY DOSE  MG 30 capsule 0     No current facility-administered medications for this visit.       MEDICATION LIST (after visit)  Current Outpatient Medications   Medication Sig Dispense Refill    ARIPiprazole (ABILIFY) 5 MG tablet Take 1 tablet (5 mg) by mouth daily. 90 tablet 0    gabapentin (NEURONTIN) 100 MG capsule Take 1 capsule (100 mg) by mouth 2 times daily AND 3 capsules (300 mg) at bedtime. 270 capsule 0    gabapentin (NEURONTIN) 100 MG capsule Take 1 capsule (100 mg) by mouth 3 times daily. 270 capsule 0    levonorgestrel-ethinyl estradiol (SEASONALE) 0.15-0.03 MG tablet Take 1 tablet by mouth daily. 91 tablet 3    naltrexone (DEPADE/REVIA) 50 MG tablet Take 1 tablet (50 mg) by mouth daily. 30 tablet 1     venlafaxine (EFFEXOR XR) 150 MG 24 hr capsule Take 1 capsule (150 mg) by mouth daily. 90 capsule 1    venlafaxine (EFFEXOR XR) 75 MG 24 hr capsule TAKE 1 CAPSULE(75 MG) BY MOUTH DAILY IN ADDITION TO EFFEXOR  MG MAKING A. TOTAL DAILY DOSE  MG 30 capsule 0     No current facility-administered medications for this visit.         No Known Allergies     Continued Complex Management  The longitudinal plan of care for Alcohol Use Disorder (AUD) was addressed during this visit. Due to the added complexity in care, I will continue to support Ashleigh in the subsequent management and with ongoing continuity of care.        Ana Levy, ANTHONY,MSN, AGNP-C  [facility]   [address], [address] [place] [id number]   Phone # [phone]

## 2025-05-27 NOTE — NURSING NOTE
Is the patient currently in the state of [place]? YES    Current patient location: Patient declined to provide     Visit mode:Video    If the visit is dropped, the patient can be reconnected by: VIDEO VISIT: Text to cell phone:   Telephone Information:   Mobile [phone]       Will anyone else be joining the visit? No  (If patient encounters technical issues they should call [phone])    Are changes needed to the allergy or medication list? No    Are refills needed on medications prescribed by this physician? No    Rooming Documentation: Questionnaire(s) completed.    Reason for visit: RECHUVALDO Hayden

## 2025-07-01 ENCOUNTER — VIRTUAL VISIT (OUTPATIENT)
Dept: ADDICTION MEDICINE | Facility: CLINIC | Age: 25
End: 2025-07-01
Payer: COMMERCIAL

## 2025-07-01 DIAGNOSIS — F10.20 ALCOHOL USE DISORDER, MODERATE, DEPENDENCE (H): Primary | ICD-10-CM

## 2025-07-01 DIAGNOSIS — F33.1 MAJOR DEPRESSIVE DISORDER, RECURRENT EPISODE, MODERATE (H): ICD-10-CM

## 2025-07-01 DIAGNOSIS — F41.9 ANXIETY: ICD-10-CM

## 2025-07-01 ASSESSMENT — PATIENT HEALTH QUESTIONNAIRE - PHQ9
SUM OF ALL RESPONSES TO PHQ QUESTIONS 1-9: 13
10. IF YOU CHECKED OFF ANY PROBLEMS, HOW DIFFICULT HAVE THESE PROBLEMS MADE IT FOR YOU TO DO YOUR WORK, TAKE CARE OF THINGS AT HOME, OR GET ALONG WITH OTHER PEOPLE: SOMEWHAT DIFFICULT
SUM OF ALL RESPONSES TO PHQ QUESTIONS 1-9: 13

## 2025-07-01 ASSESSMENT — ANXIETY QUESTIONNAIRES
GAD7 TOTAL SCORE: 11
7. FEELING AFRAID AS IF SOMETHING AWFUL MIGHT HAPPEN: MORE THAN HALF THE DAYS
3. WORRYING TOO MUCH ABOUT DIFFERENT THINGS: MORE THAN HALF THE DAYS
5. BEING SO RESTLESS THAT IT IS HARD TO SIT STILL: NOT AT ALL
2. NOT BEING ABLE TO STOP OR CONTROL WORRYING: MORE THAN HALF THE DAYS
4. TROUBLE RELAXING: MORE THAN HALF THE DAYS
GAD7 TOTAL SCORE: 11
IF YOU CHECKED OFF ANY PROBLEMS ON THIS QUESTIONNAIRE, HOW DIFFICULT HAVE THESE PROBLEMS MADE IT FOR YOU TO DO YOUR WORK, TAKE CARE OF THINGS AT HOME, OR GET ALONG WITH OTHER PEOPLE: SOMEWHAT DIFFICULT
GAD7 TOTAL SCORE: 11
1. FEELING NERVOUS, ANXIOUS, OR ON EDGE: MORE THAN HALF THE DAYS
7. FEELING AFRAID AS IF SOMETHING AWFUL MIGHT HAPPEN: MORE THAN HALF THE DAYS
8. IF YOU CHECKED OFF ANY PROBLEMS, HOW DIFFICULT HAVE THESE MADE IT FOR YOU TO DO YOUR WORK, TAKE CARE OF THINGS AT HOME, OR GET ALONG WITH OTHER PEOPLE?: SOMEWHAT DIFFICULT
6. BECOMING EASILY ANNOYED OR IRRITABLE: SEVERAL DAYS

## 2025-07-01 NOTE — PROGRESS NOTES
Virtual Visit Details    Type of service:  Video Visit   Start 1557  End 1624  Originating Location (pt. Location): Home    Distant Location (provider location):  Off-site  Platform used for Video Visit: [place] [employer] [facility]    A/P                                                    ASSESSMENT/PLAN  Diagnoses and all orders for this visit:  1. Alcohol use disorder, moderate, dependence (H) (Primary)   Alcohol use remains at regular levels, with increased consumption on birthday. Discussed alcohol use may be contributing to her depression and  may be affecting the effectiveness of depression medications.   Is taking naltrexone 50 mg daily, denies side effects. Continue.  Consider increasing naltrexone to 100 mg daily if normal liver function  - Schedule lab appointment to check liver function before considering increasing naltrexone.  - Follow-up in 2 weeks to discuss lab results and potential naltrexone increase. Encourage participation in recovery activities such as AA/NA meetings.  -Encouraged her to reconsider starting IOP  Having PRS reach out to patient to provide additional support  Consider [facility] referral at follow up    2. Major depressive disorder, recurrent episode, moderate (H)  Reporting increased depression and passive SI, denies plan or intent. Provided resources for mental health crisis, including [facility] empath unit and 71 hotline.  Is seeing psychiatry for medication management, assisted with rescheduling her missed appointment last week to 8/5/25.   Is taking venlafaxine, Abilify, an gabapentin.   Has appointment with [facility] 7/16.     3. Anxiety  Is taking gabapentin which is providing benefits, continue.         Consent was obtained from the patient to use an AI documentation tool in the creation of this note.  No orders of the defined types were placed in this encounter.      Problem list updated Jul 1, 2025   No problems updated.          PDMP Review   "       Value Time User    State PDMP site checked  Yes 5/13/2025 11:16 AM Ana Levy CNP              RTC  No follow-ups on file.      Counseled the patient on the importance of having a recovery program in addition to medication to manage recovery.  Components include avoiding isolating, having willingness to change, avoiding triggers and managing cravings. Encouraged having some type of sober network and practicing honesty with trusted support person(s). Encouraged other services such as counseling, 12 step or other self-help organizations.            SUBJECTIVE                                                        HPI:   Ashleigh Bustillo is a 25 year old female with history of depression, cannabis, nicotine and alochol use who presents for follow up     Brief history: Initial visit 12/5/2024 with Cindy Benito CNP.   Ashleigh's substance use started at age 19 and started using on a regular basis two years ago.  Ashleigh has had thoughts of taking a break from using substances but has not been able to reduce or abstain.  Currently alcohol use is mixed vodka drinks 2-6 drinks every other day, she is using cannabis vaping and flower throughout the day, and nicotine use throughout the day.  Ashleigh reports that her dad struggles with alcohol use and her brother uses \"Harder drugs\" unable to identify.  She has not been to treatment or tried pharmacotherapy options in the past.  She would like to reduce/abstain from all substances.    Naltrexone was started at her intiial appointment. She was lost to follow up, RTC 4/22/25.         Substance Use History:   \"Have you ever had any history with [...] use?\" And \"When was your last use?  ALCOHOL - every other day  CANNABIS - daily  PRESCRIPTION STIMULANTS (includes Ritalin, Adderall, Vyvanse) - denies  COCAINE/CRACK - denies  METH/AMPHETAMINES (includes ecstacy, MDMA/patrice) - denies  OPIATES - denies  BENZODIAZEPINES (includes Ativan, Klonopin, Xanax) - denies  KRATOM (mild " opioid and stimulant effects) - denies  KETAMINE - denies  HALLUCINOGENS (includes DXM) - experimental   BEHAVIORAL (Gambling, Eating d/o, Compulsivity) - anorexia   History of treatment - denies  NICOTINE  Cigarettes: none  Chew/snus: none  Vaping: current,   Past NRT/medication use: none         Previous withdrawal treatment episodes (e.g. detox): denies  Previous [facility] treatment programs: denies  Hospitalizations or overdose: inpatient 14 years old  Medical complications from substance use: unknown  IV Drug use?: denies  Previous Medication for Addiction Tx: denies  Longest period of full abstinence:  none  Activities that have previously supported abstinence: none  Current Recovery Activities: none        TODAY'S VISIT  HPI Jul 1, 2025    - Experienced a recurrence of depression starting about a week ago, making it difficult to keep up with medication and appointments.  - Experiencing suicidal ideations without a plan or intent; last time she made a plan or took action 4 to 6 months ago.  - Needs to reschedule missed appointment with her psychiatrist.  - Missed a therapy appointment last week; next one scheduled for 07/16/25.  - Continues to work but finds it challenging to motivate herself to go in.  - Taking naltrexone, Effexor, Abilify, and gabapentin.  - Consumed a significant amount of alcohol on her birthday; unsure about current alcohol consumption but believes it has not decreased.  - Had only one drink the day before the encounter.  - Considering outpatient treatment but concerned about the time commitment.  - Open to attending peer-led support groups or AA meetings.  - Reports no difficulty falling asleep, but feels tired due to early waking hours.      Recent HPI Details:5/27/25  1. Alcohol use disorder, moderate, dependence (H) (Primary)  Improving. Is taking 50 mg of naltrexone daily and has reduced her alcohol use. Is scheduled for intake at [facility] for IOP tonight but may not be able to  continue programmatic care due to out of pocket costs.   Discussed attendance at peer led support groups. Resources provided.   Continue naltrexone 50 mg daily  - naltrexone (DEPADE/REVIA) 50 MG tablet; Take 1 tablet (50 mg) by mouth daily.  Dispense: 30 tablet; Refill: 1     2. Cannabis dependence (H)  Has not started NAC yet, medication unavailable at her pharmacy. May get from other sources without RX.  Encouraged recovery activities      3. Anxiety  4. Major depressive disorder, recurrent episode, moderate (H)  Mood is improving, anxiety decreasing. Is taking gabapentin and Effexor  prescribed by psychiatry.   Will start therapy at [facility]     5. Sleep disturbance  Sleeping has improved with increasing gabapentin to 300 mg at bedtime                     5/13/2025     8:57 AM 6/22/2025     6:31 PM 7/1/2025     3:37 PM   PHQ   PHQ-9 Total Score 14 9  13    Q9: Thoughts of better off dead/self-harm past 2 weeks Several days Several days More than half the days   F/U: Thoughts of suicide or self-harm  Yes Yes   F/U: Self harm-plan  No No   F/U: Self-harm action  No No   F/U: Safety concerns  No No       Patient-reported       OBJECTIVE                                                    PHYSICAL EXAM:  There were no vitals taken for this visit.      Physical Exam    LAB      HISTORY                                                    Problem list reviewed & adjusted, as indicated.  Patient Active Problem List   Diagnosis    Anorexia    Anxiety    Recurrent major depressive disorder, in partial remission    History of suicide attempt    Cannabis dependence (H)    Major depressive disorder, recurrent episode, moderate (H)    Alcohol dependence (H)    Perforated appendicitis         MEDICATION LIST (prior to visit)  Current Outpatient Medications   Medication Sig Dispense Refill    ARIPiprazole (ABILIFY) 5 MG tablet Take 1 tablet (5 mg) by mouth daily. 90 tablet 0    gabapentin (NEURONTIN) 100 MG capsule Take 1  capsule (100 mg) by mouth 2 times daily AND 3 capsules (300 mg) at bedtime. 270 capsule 0    gabapentin (NEURONTIN) 100 MG capsule Take 1 capsule (100 mg) by mouth 3 times daily. 270 capsule 0    levonorgestrel-ethinyl estradiol (SEASONALE) 0.15-0.03 MG tablet Take 1 tablet by mouth daily. 91 tablet 3    naltrexone (DEPADE/REVIA) 50 MG tablet Take 1 tablet (50 mg) by mouth daily. 30 tablet 1    venlafaxine (EFFEXOR XR) 150 MG 24 hr capsule Take 1 capsule (150 mg) by mouth daily. 90 capsule 1    venlafaxine (EFFEXOR XR) 75 MG 24 hr capsule TAKE 1 CAPSULE(75 MG) BY MOUTH DAILY IN ADDITION TO EFFEXOR  MG MAKING A. TOTAL DAILY DOSE  MG 30 capsule 0     No current facility-administered medications for this visit.       MEDICATION LIST (after visit)  Current Outpatient Medications   Medication Sig Dispense Refill    ARIPiprazole (ABILIFY) 5 MG tablet Take 1 tablet (5 mg) by mouth daily. 90 tablet 0    gabapentin (NEURONTIN) 100 MG capsule Take 1 capsule (100 mg) by mouth 2 times daily AND 3 capsules (300 mg) at bedtime. 270 capsule 0    gabapentin (NEURONTIN) 100 MG capsule Take 1 capsule (100 mg) by mouth 3 times daily. 270 capsule 0    levonorgestrel-ethinyl estradiol (SEASONALE) 0.15-0.03 MG tablet Take 1 tablet by mouth daily. 91 tablet 3    naltrexone (DEPADE/REVIA) 50 MG tablet Take 1 tablet (50 mg) by mouth daily. 30 tablet 1    venlafaxine (EFFEXOR XR) 150 MG 24 hr capsule Take 1 capsule (150 mg) by mouth daily. 90 capsule 1    venlafaxine (EFFEXOR XR) 75 MG 24 hr capsule TAKE 1 CAPSULE(75 MG) BY MOUTH DAILY IN ADDITION TO EFFEXOR  MG MAKING A. TOTAL DAILY DOSE  MG 30 capsule 0     No current facility-administered medications for this visit.         No Known Allergies     Continued Complex Management  The longitudinal plan of care for Alcohol Use Disorder (AUD) was addressed during this visit. Due to the added complexity in care, I will continue to support Ashleigh in the subsequent  management and with ongoing continuity of care.    Ana Levy DNP,MSN, AGNP-C  [facility]   [address], [address]   [place] [address]   Phone # [phone]

## 2025-07-01 NOTE — NURSING NOTE
Current patient location: [address] [place] [id number]    Is the patient currently in the state of [place]? YES    Visit mode: VIDEO    If the visit is dropped, the patient can be reconnected by:VIDEO VISIT: Text to cell phone:   Telephone Information:   Mobile [phone]       Will anyone else be joining the visit? NO  (If patient encounters technical issues they should call [phone])    Are changes needed to the allergy or medication list? Pt stated no changes to allergies and Pt stated no med changes    Are refills needed on medications prescribed by this physician? Discuss with provider    Rooming Documentation:  Questionnaire(s) completed    Reason for visit: CATIE BAILON

## 2025-07-08 ENCOUNTER — TELEPHONE (OUTPATIENT)
Dept: ADDICTION MEDICINE | Facility: CLINIC | Age: 25
End: 2025-07-08
Payer: COMMERCIAL

## 2025-07-08 NOTE — TELEPHONE ENCOUNTER
Peer  attempted to contact the patient to check on her progress. The purpose of this check-in was to provide support, assess any challenges or concerns, and offer guidance if needed. Unfortunately, the patient was not available. Left Vm

## 2025-08-04 ENCOUNTER — HOSPITAL ENCOUNTER (EMERGENCY)
Facility: CLINIC | Age: 25
Discharge: HOME | End: 2025-08-04
Attending: STUDENT IN AN ORGANIZED HEALTH CARE EDUCATION/TRAINING PROGRAM | Admitting: STUDENT IN AN ORGANIZED HEALTH CARE EDUCATION/TRAINING PROGRAM
Payer: COMMERCIAL

## 2025-08-04 VITALS
SYSTOLIC BLOOD PRESSURE: 106 MMHG | OXYGEN SATURATION: 98 % | RESPIRATION RATE: 16 BRPM | DIASTOLIC BLOOD PRESSURE: 74 MMHG | HEIGHT: 62 IN | BODY MASS INDEX: 22.8 KG/M2 | TEMPERATURE: 98.3 F | HEART RATE: 88 BPM | WEIGHT: 123.9 LBS

## 2025-08-04 DIAGNOSIS — R45.851 SUICIDAL IDEATION: Primary | ICD-10-CM

## 2025-08-04 DIAGNOSIS — F41.9 ANXIETY AND DEPRESSION: ICD-10-CM

## 2025-08-04 DIAGNOSIS — F32.A ANXIETY AND DEPRESSION: ICD-10-CM

## 2025-08-04 PROBLEM — F41.1 GENERALIZED ANXIETY DISORDER: Status: ACTIVE | Noted: 2025-08-04

## 2025-08-04 PROBLEM — F33.2 SEVERE RECURRENT MAJOR DEPRESSION WITHOUT PSYCHOTIC FEATURES (H): Status: ACTIVE | Noted: 2025-08-04

## 2025-08-04 LAB
AMPHETAMINES UR QL SCN: ABNORMAL
BARBITURATES UR QL SCN: ABNORMAL
BENZODIAZ UR QL SCN: ABNORMAL
BZE UR QL SCN: ABNORMAL
CANNABINOIDS UR QL SCN: ABNORMAL
CREAT UR-MCNC: 205 MG/DL
FENTANYL UR QL: ABNORMAL
HCG UR QL: NEGATIVE
OPIATES UR QL SCN: ABNORMAL
PCP QUAL URINE (ROCHE): ABNORMAL

## 2025-08-04 PROCEDURE — 99283 EMERGENCY DEPT VISIT LOW MDM: CPT | Performed by: STUDENT IN AN ORGANIZED HEALTH CARE EDUCATION/TRAINING PROGRAM

## 2025-08-04 PROCEDURE — 99285 EMERGENCY DEPT VISIT HI MDM: CPT

## 2025-08-04 PROCEDURE — 99244 OFF/OP CNSLTJ NEW/EST MOD 40: CPT | Performed by: NURSE PRACTITIONER

## 2025-08-04 PROCEDURE — 80349 CANNABINOIDS NATURAL: CPT

## 2025-08-04 PROCEDURE — 80307 DRUG TEST PRSMV CHEM ANLYZR: CPT

## 2025-08-04 PROCEDURE — 81025 URINE PREGNANCY TEST: CPT

## 2025-08-04 PROCEDURE — 250N000013 HC RX MED GY IP 250 OP 250 PS 637

## 2025-08-04 RX ORDER — IBUPROFEN 600 MG/1
600 TABLET, FILM COATED ORAL EVERY 6 HOURS PRN
Status: DISCONTINUED | OUTPATIENT
Start: 2025-08-04 | End: 2025-08-04 | Stop reason: HOSPADM

## 2025-08-04 RX ORDER — TRAZODONE HYDROCHLORIDE 50 MG/1
50 TABLET ORAL
Status: DISCONTINUED | OUTPATIENT
Start: 2025-08-04 | End: 2025-08-04 | Stop reason: HOSPADM

## 2025-08-04 RX ORDER — OLANZAPINE 10 MG/1
10 TABLET, ORALLY DISINTEGRATING ORAL 3 TIMES DAILY PRN
Status: DISCONTINUED | OUTPATIENT
Start: 2025-08-04 | End: 2025-08-04 | Stop reason: HOSPADM

## 2025-08-04 RX ORDER — HYDROXYZINE HYDROCHLORIDE 25 MG/1
25 TABLET, FILM COATED ORAL EVERY 4 HOURS PRN
Status: DISCONTINUED | OUTPATIENT
Start: 2025-08-04 | End: 2025-08-04 | Stop reason: HOSPADM

## 2025-08-04 RX ORDER — OLANZAPINE 10 MG/2ML
10 INJECTION, POWDER, FOR SOLUTION INTRAMUSCULAR 3 TIMES DAILY PRN
Status: DISCONTINUED | OUTPATIENT
Start: 2025-08-04 | End: 2025-08-04 | Stop reason: HOSPADM

## 2025-08-04 RX ADMIN — NICOTINE POLACRILEX 2 MG: 2 GUM, CHEWING BUCCAL at 17:35

## 2025-08-04 RX ADMIN — NICOTINE POLACRILEX 2 MG: 2 GUM, CHEWING BUCCAL at 18:44

## 2025-08-04 RX ADMIN — HYDROXYZINE HYDROCHLORIDE 25 MG: 25 TABLET ORAL at 18:45

## 2025-08-04 ASSESSMENT — ANXIETY QUESTIONNAIRES
GAD7 TOTAL SCORE: 13
2. NOT BEING ABLE TO STOP OR CONTROL WORRYING: NEARLY EVERY DAY
GAD7 TOTAL SCORE: 13
5. BEING SO RESTLESS THAT IT IS HARD TO SIT STILL: SEVERAL DAYS
3. WORRYING TOO MUCH ABOUT DIFFERENT THINGS: MORE THAN HALF THE DAYS
IF YOU CHECKED OFF ANY PROBLEMS ON THIS QUESTIONNAIRE, HOW DIFFICULT HAVE THESE PROBLEMS MADE IT FOR YOU TO DO YOUR WORK, TAKE CARE OF THINGS AT HOME, OR GET ALONG WITH OTHER PEOPLE: VERY DIFFICULT
4. TROUBLE RELAXING: SEVERAL DAYS
GAD7 TOTAL SCORE: 13
7. FEELING AFRAID AS IF SOMETHING AWFUL MIGHT HAPPEN: MORE THAN HALF THE DAYS
8. IF YOU CHECKED OFF ANY PROBLEMS, HOW DIFFICULT HAVE THESE MADE IT FOR YOU TO DO YOUR WORK, TAKE CARE OF THINGS AT HOME, OR GET ALONG WITH OTHER PEOPLE?: VERY DIFFICULT
1. FEELING NERVOUS, ANXIOUS, OR ON EDGE: NEARLY EVERY DAY
6. BECOMING EASILY ANNOYED OR IRRITABLE: SEVERAL DAYS
7. FEELING AFRAID AS IF SOMETHING AWFUL MIGHT HAPPEN: MORE THAN HALF THE DAYS

## 2025-08-04 ASSESSMENT — ACTIVITIES OF DAILY LIVING (ADL)
ADLS_ACUITY_SCORE: 50

## 2025-08-04 ASSESSMENT — PATIENT HEALTH QUESTIONNAIRE - PHQ9
SUM OF ALL RESPONSES TO PHQ QUESTIONS 1-9: 11
SUM OF ALL RESPONSES TO PHQ QUESTIONS 1-9: 11
10. IF YOU CHECKED OFF ANY PROBLEMS, HOW DIFFICULT HAVE THESE PROBLEMS MADE IT FOR YOU TO DO YOUR WORK, TAKE CARE OF THINGS AT HOME, OR GET ALONG WITH OTHER PEOPLE: SOMEWHAT DIFFICULT

## 2025-08-05 ENCOUNTER — VIRTUAL VISIT (OUTPATIENT)
Dept: PSYCHIATRY | Facility: CLINIC | Age: 25
End: 2025-08-05
Payer: COMMERCIAL

## 2025-08-05 ENCOUNTER — TELEPHONE (OUTPATIENT)
Dept: BEHAVIORAL HEALTH | Facility: CLINIC | Age: 25
End: 2025-08-05
Payer: COMMERCIAL

## 2025-08-05 VITALS — WEIGHT: 123 LBS | BODY MASS INDEX: 22.5 KG/M2

## 2025-08-05 DIAGNOSIS — F33.41 RECURRENT MAJOR DEPRESSIVE DISORDER, IN PARTIAL REMISSION: Primary | ICD-10-CM

## 2025-08-05 DIAGNOSIS — F12.20 CANNABIS DEPENDENCE (H): ICD-10-CM

## 2025-08-05 DIAGNOSIS — F10.20 UNCOMPLICATED ALCOHOL DEPENDENCE (H): ICD-10-CM

## 2025-08-05 DIAGNOSIS — F41.1 GAD (GENERALIZED ANXIETY DISORDER): ICD-10-CM

## 2025-08-05 RX ORDER — VENLAFAXINE HYDROCHLORIDE 75 MG/1
CAPSULE, EXTENDED RELEASE ORAL
Qty: 90 CAPSULE | Refills: 1 | Status: SHIPPED | OUTPATIENT
Start: 2025-08-05

## 2025-08-05 RX ORDER — ARIPIPRAZOLE 2 MG/1
2 TABLET ORAL DAILY
Qty: 30 TABLET | Refills: 1 | Status: SHIPPED | OUTPATIENT
Start: 2025-08-05

## 2025-08-05 RX ORDER — VENLAFAXINE HYDROCHLORIDE 37.5 MG/1
37.5 CAPSULE, EXTENDED RELEASE ORAL DAILY
Qty: 7 CAPSULE | Refills: 0 | Status: SHIPPED | OUTPATIENT
Start: 2025-08-05

## 2025-08-05 ASSESSMENT — PAIN SCALES - GENERAL: PAINLEVEL_OUTOF10: NO PAIN (0)

## 2025-08-06 LAB
CANNABINOIDS UR CFM-MCNC: 1671 NG/ML
CARBOXYTHC/CREAT UR: 815 NG/MG CREAT

## 2025-08-14 ENCOUNTER — LAB (OUTPATIENT)
Dept: LAB | Facility: CLINIC | Age: 25
End: 2025-08-14
Payer: COMMERCIAL

## 2025-08-14 DIAGNOSIS — F10.20 ALCOHOL USE DISORDER, MODERATE, DEPENDENCE (H): ICD-10-CM

## 2025-08-14 LAB
ALBUMIN SERPL BCG-MCNC: 4.6 G/DL (ref 3.5–5.2)
ALP SERPL-CCNC: 63 U/L (ref 40–150)
ALT SERPL W P-5'-P-CCNC: 19 U/L (ref 0–70)
AST SERPL W P-5'-P-CCNC: 24 U/L (ref 0–45)
BILIRUB SERPL-MCNC: 0.6 MG/DL
BILIRUBIN DIRECT (ROCHE PRO & PURE): 0.28 MG/DL (ref 0–0.45)
PROT SERPL-MCNC: 7.7 G/DL (ref 6.4–8.3)

## (undated) DEVICE — STPL ENDO RELOAD 45X3.5MM 6R45B

## (undated) DEVICE — ELECTRODE PATIENT RETURN ADULT L10 FT 2 PLATE CORD 0855C

## (undated) DEVICE — SOL WATER IRRIG 1000ML BOTTLE 2F7114

## (undated) DEVICE — MITT PRE-OP 7 L X 5 1/2 W 5177M1

## (undated) DEVICE — SU ETHILON 2-0 FS 18" 664H

## (undated) DEVICE — DRAIN BLAKE 19FR SIL 2231

## (undated) DEVICE — STPL ENDO LINEAR CUT ARTICULATING 45MM ATS45

## (undated) DEVICE — BASIN EMESIS STERILE  SSK9005A

## (undated) DEVICE — DRSG DRAIN 4X4" 7086

## (undated) DEVICE — BLADE CLIPPER DISP 4406

## (undated) DEVICE — SUTURE PASSOR W/GUIDE RSG-14F-4-WG

## (undated) DEVICE — ENDO TROCAR SLEEVE KII Z-THREADED 05X100MM CTS02

## (undated) DEVICE — GLOVE BIOGEL PI ULTRATOUCH G SZ 6.0 42160

## (undated) DEVICE — SUCTION STRYKERFLOW II 250-070-500

## (undated) DEVICE — TUBING SMOKE EVAC PNEUMOCLEAR HIGH FLOW 0620050250

## (undated) DEVICE — ENDO POUCH UNIV RETRIEVAL SYSTEM INZII 10MM CD001

## (undated) DEVICE — ENDO TROCAR OPTICAL ACCESS KII Z-THRD 05X100MM CTR03

## (undated) DEVICE — SUCTION MANIFOLD NEPTUNE 2 SYS 1 PORT 702-025-000

## (undated) DEVICE — ENDO TROCAR FIRST ENTRY KII FIOS Z-THRD 12X100MM CTF73

## (undated) DEVICE — SU VICRYL+ 4-0 UNDYED PS-2 VCP496ZH

## (undated) DEVICE — VIAL DECANTER STERILE WHITE DYNJDEC06

## (undated) DEVICE — SOL RINGERS LACTATED 1000ML BAG 2B2324X

## (undated) DEVICE — ESU CORD MONOPOLAR 10'  E0510

## (undated) DEVICE — BLADE KNIFE SURG 11 371111

## (undated) DEVICE — SU VICRYL+ 0 27 UR6 VLT VCP603H

## (undated) DEVICE — PREP CHLORAPREP 26ML TINTED HI-LITE ORANGE 930815

## (undated) DEVICE — CUSTOM PACK LAP CHOLE SBA5BLCHEA

## (undated) DEVICE — GOWN IMPERVIOUS BREATHABLE SMART LG 89015

## (undated) DEVICE — DRAIN RESERVOIR 100ML JP 0070740

## (undated) RX ORDER — LIDOCAINE HYDROCHLORIDE 10 MG/ML
INJECTION, SOLUTION EPIDURAL; INFILTRATION; INTRACAUDAL; PERINEURAL
Status: DISPENSED
Start: 2025-02-27

## (undated) RX ORDER — ONDANSETRON 2 MG/ML
INJECTION INTRAMUSCULAR; INTRAVENOUS
Status: DISPENSED
Start: 2025-02-27

## (undated) RX ORDER — FENTANYL CITRATE 50 UG/ML
INJECTION, SOLUTION INTRAMUSCULAR; INTRAVENOUS
Status: DISPENSED
Start: 2025-02-27

## (undated) RX ORDER — PROPOFOL 10 MG/ML
INJECTION, EMULSION INTRAVENOUS
Status: DISPENSED
Start: 2025-02-27

## (undated) RX ORDER — DEXAMETHASONE SODIUM PHOSPHATE 4 MG/ML
INJECTION, SOLUTION INTRA-ARTICULAR; INTRALESIONAL; INTRAMUSCULAR; INTRAVENOUS; SOFT TISSUE
Status: DISPENSED
Start: 2025-02-27

## (undated) RX ORDER — KETOROLAC TROMETHAMINE 30 MG/ML
INJECTION, SOLUTION INTRAMUSCULAR; INTRAVENOUS
Status: DISPENSED
Start: 2025-02-27